# Patient Record
Sex: MALE | Race: WHITE | Employment: OTHER | URBAN - METROPOLITAN AREA
[De-identification: names, ages, dates, MRNs, and addresses within clinical notes are randomized per-mention and may not be internally consistent; named-entity substitution may affect disease eponyms.]

---

## 2017-09-18 RX ORDER — ZOLPIDEM TARTRATE 5 MG/1
TABLET ORAL
Qty: 90 TAB | Refills: 0 | Status: SHIPPED | OUTPATIENT
Start: 2017-09-18 | End: 2017-12-14 | Stop reason: SDUPTHER

## 2017-09-18 NOTE — TELEPHONE ENCOUNTER
Requested Prescriptions     Pending Prescriptions Disp Refills    zolpidem (AMBIEN) 5 mg tablet [Pharmacy Med Name: ZOLPIDEM 5MG        TAB] 90 Tab 0     Sig: TAKE ONE TABLET BY MOUTH AT BEDTIME       Last Refill: 6-19-17  Last visit:2-15-17

## 2017-11-02 ENCOUNTER — HOSPITAL ENCOUNTER (EMERGENCY)
Age: 67
Discharge: HOME | End: 2017-11-02
Attending: EMERGENCY MEDICINE
Payer: MEDICARE

## 2017-11-02 ENCOUNTER — APPOINTMENT (OUTPATIENT)
Dept: GENERAL RADIOLOGY | Age: 67
End: 2017-11-02
Attending: EMERGENCY MEDICINE
Payer: MEDICARE

## 2017-11-02 VITALS
OXYGEN SATURATION: 98 % | DIASTOLIC BLOOD PRESSURE: 67 MMHG | HEIGHT: 72 IN | SYSTOLIC BLOOD PRESSURE: 135 MMHG | RESPIRATION RATE: 16 BRPM | HEART RATE: 68 BPM | TEMPERATURE: 98 F

## 2017-11-02 DIAGNOSIS — R07.81 RIB PAIN ON LEFT SIDE: Primary | ICD-10-CM

## 2017-11-02 PROCEDURE — 71101 X-RAY EXAM UNILAT RIBS/CHEST: CPT

## 2017-11-02 PROCEDURE — 93005 ELECTROCARDIOGRAM TRACING: CPT

## 2017-11-02 PROCEDURE — 99284 EMERGENCY DEPT VISIT MOD MDM: CPT

## 2017-11-02 RX ORDER — TRAMADOL HYDROCHLORIDE 50 MG/1
50 TABLET ORAL
Qty: 20 TAB | Refills: 0 | Status: SHIPPED | OUTPATIENT
Start: 2017-11-02 | End: 2017-11-12

## 2017-11-02 NOTE — ED NOTES
Patient c/o L side rib pain since waking up this morning. Patient states it is worse when coughing and sneezing. Patient has hx of stents.

## 2017-11-02 NOTE — DISCHARGE INSTRUCTIONS

## 2017-11-02 NOTE — ED PROVIDER NOTES
[facility] [address]  EMERGENCY DEPARTMENT HISTORY AND PHYSICAL EXAM       Date of Service: 11/2/2017   Patient Name: Rosana French   YOB: 1950  Medical Record Number: [id number]    History of Presenting Illness     Chief Complaint   Patient presents with    Rib Pain     L side rib pain since this morning, worse when coughing or sneezing. denies injury. History Provided By:  patient    Additional History:   Rosana French is a 69 y.o. male with PMhx significant for psoriatic arthritis who presents ambulatory to the ED with cc of progressively worsening, mild L sided rib pain since waking up this morning. Pt states his pain radiates from the back of his L ribs to the front. He states his pain is exacerbated with forceful movements like sneezing and coughing, but he denies any pain with inspiration. He notes that he normally sleeps on his back but last night he was sleeping on his L side. He denies taking any medications for his symptoms. He notes he is retired and no longer works. He specifically denies any fevers, chills, nausea, vomiting, chest pain, shortness of breath, headache, rash, diarrhea, sweating or weight loss. Social Hx: former Tobacco, - EtOH, - Illicit Drugs    There are no other complaints, changes or physical findings at this time.     Primary Care Provider: Shelby Hess MD     Past History     Past Medical History:   Past Medical History:   Diagnosis Date    History of BPH     Psoriatic arthritis ([facility] [address]         Past Surgical History:   Past Surgical History:   Procedure Laterality Date    HX KNEE ARTHROSCOPY Bilateral     HX ORTHOPAEDIC Right     Cubital tunnel sx        Family History:   Family History   Problem Relation Age of Onset    Bleeding Prob Mother     No Known Problems Father     Heart Disease Brother         Social History:   Social History   Substance Use Topics    Smoking status: Former Smoker Quit date: 1/1/1972    Smokeless tobacco: None    Alcohol use No        Allergies:   No Known Allergies      Review of Systems   Review of Systems   Constitutional: Negative. Negative for activity change, appetite change, chills, diaphoresis, fatigue, fever and unexpected weight change. HENT: Negative. Negative for congestion, hearing loss, rhinorrhea, sneezing and voice change. Eyes: Negative. Negative for pain and visual disturbance. Respiratory: Negative. Negative for apnea, cough, choking, chest tightness and shortness of breath. Cardiovascular: Negative. Negative for chest pain and palpitations. Gastrointestinal: Negative. Negative for abdominal distention, abdominal pain, blood in stool, diarrhea, nausea and vomiting. Genitourinary: Negative. Negative for difficulty urinating, flank pain, frequency and urgency. No discharge   Musculoskeletal: Positive for myalgias (L sided ribs ). Negative for arthralgias, back pain and neck stiffness. Skin: Negative. Negative for color change and rash. Neurological: Negative. Negative for dizziness, seizures, syncope, speech difficulty, weakness, numbness and headaches. Hematological: Negative for adenopathy. Psychiatric/Behavioral: Negative. Negative for agitation, behavioral problems, dysphoric mood and suicidal ideas. The patient is not nervous/anxious. Physical Exam  Physical Exam   Constitutional: He is oriented to person, place, and time. He appears well-developed and well-nourished. No distress. HENT:   Head: Normocephalic and atraumatic. Mouth/Throat: Oropharynx is clear and moist. No oropharyngeal exudate. Eyes: Conjunctivae and EOM are normal. Pupils are equal, round, and reactive to light. Right eye exhibits no discharge. Left eye exhibits no discharge. Neck: Normal range of motion. Neck supple. Cardiovascular: Normal rate, regular rhythm and intact distal pulses. Exam reveals no gallop and no friction rub. No murmur heard. Pulmonary/Chest: Effort normal and breath sounds normal. No respiratory distress. He has no wheezes. He has no rales. He exhibits no tenderness. Abdominal: Soft. Bowel sounds are normal. He exhibits no distension and no mass. There is no tenderness. There is no rebound and no guarding. Musculoskeletal: Normal range of motion. He exhibits no edema. TTP T5 dermatome    Lymphadenopathy:     He has no cervical adenopathy. Neurological: He is alert and oriented to person, place, and time. No cranial nerve deficit. Coordination normal.   Skin: Skin is warm and dry. No rash noted. No erythema. Psychiatric: He has a normal mood and affect. Nursing note and vitals reviewed. Medical Decision Making   I am the first provider for this patient. I reviewed the vital signs, available nursing notes, past medical history, past surgical history, family history and social history. Old Medical Records: Old medical records. Provider Notes:   DDx: Sprain, strain, fx, contusion, PNA. ED Course:  10:15 AM   Initial assessment performed. The patients presenting problems have been discussed, and they are in agreement with the care plan formulated and outlined with them. I have encouraged them to ask questions as they arise throughout their visit.       Diagnostic Study Results     Labs -      Recent Results (from the past 12 hour(s))   EKG, 12 LEAD, INITIAL    Collection Time: 11/02/17 10:02 AM   Result Value Ref Range    Ventricular Rate 64 BPM    Atrial Rate 64 BPM    P-R Interval 162 ms    QRS Duration 86 ms    Q-T Interval 378 ms    QTC Calculation (Bezet) 389 ms    Calculated P Axis 35 degrees    Calculated R Axis 28 degrees    Calculated T Axis 42 degrees    Diagnosis       Normal sinus rhythm  Normal ECG  When compared with ECG of 19-MAY-2015 04:47,  No significant change was found         Radiologic Studies -  The following have been ordered and reviewed:  EXAM:  XR RIBS LT W PA CXR MIN 3 V     INDICATION:   rib pain? Additional history: Patient complaining of left-sided chest wall pain since  waking up this morning. Pain is exacerbated by coughing or sneezing. COMPARISON: Chest x-ray, 9/27/2016 and 5/18/2015     FINDINGS: A frontal radiograph of the chest and 3 oblique views of the left ribs  demonstrate no fracture. There is no pneumothorax or pleural effusion. There are  coronary artery stents.     IMPRESSION  IMPRESSION:    1. No visible rib fracture. 2. Coronary artery disease    Vital Signs-Reviewed the patient's vital signs. Patient Vitals for the past 12 hrs:   Temp Pulse Resp BP SpO2   11/02/17 1237 - 68 16 135/67 98 %   11/02/17 1100 - (!) 53 15 134/67 97 %   11/02/17 1030 - 62 14 128/64 97 %   11/02/17 1005 98 °F (36.7 °C) 68 16 137/67 99 %       Medications Given in the ED:  Medications - No data to display    EKG interpretation: (Preliminary) 10:02  Rhythm: normal sinus rhythm; and regular . Rate (approx.): 64 bpm; Axis: normal; TX interval: normal; QRS interval: normal ; ST/T wave: normal.  Written by Palak Barnes, ED Scribe, as dictated by Ashley Riley. Zachary Hussein MD    Diagnosis   Clinical Impression:   1. Rib pain on left side         Plan:  1:   Follow-up Information     Follow up With Details Comments [address], [place] Call in 1 week As needed, If symptoms worsen [address] [place] [address] Rd  [phone]          2:   Discharge Medication List as of 11/2/2017 12:35 PM      START taking these medications    Details   traMADol (ULTRAM) 50 mg tablet Take 1 Tab by mouth every six (6) hours as needed for Pain for up to 10 days. Max Daily Amount: 200 mg., Print, Disp-20 Tab, R-0         CONTINUE these medications which have NOT CHANGED    Details   zolpidem (AMBIEN) 5 mg tablet TAKE ONE TABLET BY MOUTH AT BEDTIME, Print, Disp-90 Tab, R-0      atorvastatin (LIPITOR) 20 mg tablet Take 1 Tab by mouth nightly. , Print, Disp-30 Tab, R-6      metoprolol succinate (TOPROL-XL) 25 mg XL tablet Take 0.5 Tabs by mouth daily. , Print, Disp-30 Tab, R-6      ticagrelor (BRILINTA) 90 mg tablet Take 1 Tab by mouth every twelve (12) hours every twelve (12) hours. , Print, Disp-60 Tab, R-6      aspirin delayed-release 81 mg tablet Take  by mouth daily. , Historical Med      etanercept (ENBREL) 50 mg/mL (0.98 mL) injection 50 mg by SubCUTAneous route every seven (7) days. , Historical Med      doxazosin (CARDURA) 4 mg tablet Take 4 mg by mouth nightly. Indications: BENIGN PROSTATIC HYPERTROPHY, Historical Med      oxybutynin chloride XL 10 mg CR tablet Take 10 mg by mouth nightly., Historical Med      finasteride (PROSCAR) 5 mg tablet Take 5 mg by mouth nightly., Historical Med      folic acid (FOLVITE) 1 mg tablet Take 1 mg by mouth daily. , Historical Med      methotrexate (RHEUMATREX) 2.5 mg tablet Take 15 mg by mouth every Sunday., Historical Med      oxyCODONE-acetaminophen (PERCOCET 7.5) 7.5-325 mg per tablet Take 1 Tab by mouth two (2) times a day., Historical Med           Return to ED if Worse    Disposition Note:  DISCHARGE NOTE  12:35 PM  The patient has been re-evaluated and is ready for discharge. Reviewed available results with patient. Counseled pt on diagnosis and care plan. Pt has expressed understanding, and all questions have been answered. Pt agrees with plan and agrees to follow up as recommended, or return to the ED if their symptoms worsen. Discharge instructions have been provided and explained to the pt, along with reasons to return to the ED. Attestations: This note is prepared by Екатерина Patricia, acting as Scribe for [employer] Asad Root, [address] Asad Root MD: The scribe's documentation has been prepared under my direction and personally reviewed by me in its entirety. I confirm that the note above accurately reflects all work, treatment, procedures, and medical decision making performed by me.

## 2017-11-03 LAB
ATRIAL RATE: 64 BPM
CALCULATED P AXIS, ECG09: 35 DEGREES
CALCULATED R AXIS, ECG10: 28 DEGREES
CALCULATED T AXIS, ECG11: 42 DEGREES
DIAGNOSIS, 93000: NORMAL
P-R INTERVAL, ECG05: 162 MS
Q-T INTERVAL, ECG07: 378 MS
QRS DURATION, ECG06: 86 MS
QTC CALCULATION (BEZET), ECG08: 389 MS
VENTRICULAR RATE, ECG03: 64 BPM

## 2017-12-15 RX ORDER — ZOLPIDEM TARTRATE 5 MG/1
TABLET ORAL
Qty: 90 TAB | Refills: 0 | Status: SHIPPED | OUTPATIENT
Start: 2017-12-15 | End: 2018-03-26 | Stop reason: SDUPTHER

## 2017-12-15 NOTE — TELEPHONE ENCOUNTER
Requested Prescriptions     Pending Prescriptions Disp Refills    zolpidem (AMBIEN) 5 mg tablet [Pharmacy Med Name: ZOLPIDEM 5MG        TAB] 90 Tab 0     Sig: TAKE ONE TABLET BY MOUTH AT BEDTIME       Last Refill: 11-16-17  Last visit:2-15-17

## 2018-03-23 PROBLEM — R97.20 PSA ELEVATION: Status: ACTIVE | Noted: 2018-03-23

## 2018-03-23 PROBLEM — F51.04 CHRONIC INSOMNIA: Status: ACTIVE | Noted: 2018-03-23

## 2018-03-23 PROBLEM — N13.8 BPH WITH OBSTRUCTION/LOWER URINARY TRACT SYMPTOMS: Status: ACTIVE | Noted: 2018-03-23

## 2018-03-23 PROBLEM — H40.1110 PRIMARY OPEN ANGLE GLAUCOMA OF RIGHT EYE: Status: ACTIVE | Noted: 2018-03-23

## 2018-03-23 PROBLEM — I25.10 CORONARY ARTERY DISEASE INVOLVING NATIVE CORONARY ARTERY: Status: ACTIVE | Noted: 2018-03-23

## 2018-03-23 PROBLEM — N40.1 BPH WITH OBSTRUCTION/LOWER URINARY TRACT SYMPTOMS: Status: ACTIVE | Noted: 2018-03-23

## 2018-03-23 PROBLEM — L40.50 PSORIATIC ARTHRITIS (HCC): Status: ACTIVE | Noted: 2018-03-23

## 2018-03-23 PROBLEM — E78.00 HYPERCHOLESTEROLEMIA: Status: ACTIVE | Noted: 2018-03-23

## 2018-03-26 ENCOUNTER — OFFICE VISIT (OUTPATIENT)
Dept: INTERNAL MEDICINE CLINIC | Age: 68
End: 2018-03-26

## 2018-03-26 VITALS
WEIGHT: 188.6 LBS | HEART RATE: 70 BPM | OXYGEN SATURATION: 97 % | HEIGHT: 72 IN | BODY MASS INDEX: 25.55 KG/M2 | DIASTOLIC BLOOD PRESSURE: 62 MMHG | SYSTOLIC BLOOD PRESSURE: 118 MMHG

## 2018-03-26 DIAGNOSIS — I25.10 CORONARY ARTERY DISEASE INVOLVING NATIVE CORONARY ARTERY OF NATIVE HEART WITHOUT ANGINA PECTORIS: ICD-10-CM

## 2018-03-26 DIAGNOSIS — F51.04 CHRONIC INSOMNIA: Primary | ICD-10-CM

## 2018-03-26 DIAGNOSIS — E78.00 HYPERCHOLESTEROLEMIA: ICD-10-CM

## 2018-03-26 RX ORDER — ZOLPIDEM TARTRATE 5 MG/1
5 TABLET ORAL
Qty: 90 TAB | Refills: 1 | Status: SHIPPED | OUTPATIENT
Start: 2018-03-26 | End: 2018-09-17 | Stop reason: SDUPTHER

## 2018-03-26 NOTE — PROGRESS NOTES
This note will not be viewable in [address] Subjective:     Mr. Mor Cooper presents to the office today in 1 year follow-up. The patient has chronic insomnia. A lot of this is related to his tinnitus. The patient has been on Ambien 5 mg which he takes faithfully every night at 10 x 11 p.m. he goes to sleep. He awakens at least once or twice a night due to prostate related issues however sleeps till 7 AM the next morning. He does feel refreshed and has no hangover effect. He has had no memory loss or confusion on the medication. Patient is also followed by rheumatology for psoriatic arthritis and is currently on a biologic along with Rheumatrex. He does continue to have joint pain. Patient does have heart disease for which he is followed by Dr. Marija De La Paz and is currently on Lipitor metoprolol and aspirin. He denies any exertional chest pain, claudication couple muscle soreness or GI upset. He is having routine laboratory studies done by both his cardiologist and his rheumatologist.    Past Medical History:   Diagnosis Date    BPH with obstruction/lower urinary tract symptoms 3/23/2018    Chronic insomnia 3/23/2018    Coronary artery disease involving native coronary artery 3/23/2018    Status post stents ×3    Hypercholesterolemia 3/23/2018    Primary open angle glaucoma of right eye 3/23/2018    PSA elevation 3/23/2018    Status post biopsy. Atypical small acinar proliferation    Psoriatic arthritis ([facility] [address]      Past Surgical History:   Procedure Laterality Date    HX KNEE ARTHROSCOPY Bilateral     HX ORTHOPAEDIC Right     Cubital tunnel sx     No Known Allergies  Current Outpatient Prescriptions   Medication Sig Dispense Refill    secukinumab (COSENTYX PEN) 150 mg/mL pnij by SubCUTAneous route.  zolpidem (AMBIEN) 5 mg tablet Take 1 Tab by mouth nightly. Max Daily Amount: 5 mg. 90 Tab 1    atorvastatin (LIPITOR) 20 mg tablet Take 1 Tab by mouth nightly.  30 Tab 6    metoprolol succinate (TOPROL-XL) 25 mg XL tablet Take 0.5 Tabs by mouth daily. 30 Tab 6    aspirin delayed-release 81 mg tablet Take  by mouth daily.  doxazosin (CARDURA) 4 mg tablet Take 4 mg by mouth nightly. Indications: BENIGN PROSTATIC HYPERTROPHY      oxybutynin chloride XL 10 mg CR tablet Take 10 mg by mouth nightly.  finasteride (PROSCAR) 5 mg tablet Take 5 mg by mouth nightly.  folic acid (FOLVITE) 1 mg tablet Take 1 mg by mouth daily.  methotrexate (RHEUMATREX) 2.5 mg tablet Take 15 mg by mouth every Sunday.  oxyCODONE-acetaminophen (PERCOCET 7.5) 7.5-325 mg per tablet Take 1 Tab by mouth two (2) times a day. Social History     Social History    Marital status:      Spouse name: N/A    Number of children: N/A    Years of education: N/A     Social History Main Topics    Smoking status: Former Smoker     Quit date: 1/1/1972    Smokeless tobacco: Never Used    Alcohol use No    Drug use: No    Sexual activity: Not Asked     Other Topics Concern    None     Social History Narrative     Family History   Problem Relation Age of Onset    Bleeding Prob Mother     No Known Problems Father     Heart Disease Brother        Review of Systems:  GEN: no weight loss, weight gain, fatigue or night sweats  CV: no PND, orthopnea, or palpitations  Resp: no dyspnea on exertion, no cough  Abd: no nausea, vomiting or diarrhea  EXT: denies edema, claudication  Endocrine: no hair loss, excessive thirst or polyuria  Neurological ROS: no TIA or stroke symptoms  ROS otherwise negative      Objective:     Visit Vitals    /62 (BP 1 Location: Left arm, BP Patient Position: Sitting)    Pulse 70    Ht 6' (1.829 m)    Wt 188 lb 9.6 oz (85.5 kg)    SpO2 97%    BMI 25.58 kg/m2     Body mass index is 25.58 kg/(m^2). General:   alert, cooperative and no distress   Eyes: conjunctivae/sclerae clear.  PERRL, EOM's intact   Mouth:  No oral lesions, no pharyngeal erythema, no exudates   Neck: Trachea midline, no thyromegaly, no bruits   Heart: S1 and S2 normal,no murmurs noted    Lungs: Clear to auscultation bilaterally, no increased work of breathing   Abdomen: Soft, nontender. Normal bowel sounds   Extremities: No edema or cyanosis   Neuro: ..alert, oriented x3,speech normal in context and clarity, cranial nerves II-XII intact,motor strength: full proximally and distally,gait: normal  reflexes: full and symmetric     Physical exam otherwise negative         Assessment/Plan:     Diagnoses and all orders for this visit:    Chronic insomnia  -     zolpidem (AMBIEN) 5 mg tablet; Take 1 Tab by mouth nightly. Max Daily Amount: 5 mg., Print, Disp-90 Tab, R-1    Coronary artery disease involving native coronary artery of native heart without angina pectoris    Hypercholesterolemia        Other instructions: The patient's medications are reviewed and reconciled. No change in his current medical regimen is made. A no added salt, prudent diet is encouraged  Number no change in Ambien dosage as it continues to work effectively in controlling his insomnia. Continued follow-up with rheumatology, cardiology and urology    Follow-up here yearly    Follow-up Disposition:  Return in about 1 year (around 3/26/2019).     Héctor Ivan MD

## 2018-03-26 NOTE — MR AVS SNAPSHOT
[address] [address] [address] [address] [phone] Patient: Mitra Long MRN: [id number] KER:[id number] Visit Information Date & Time Provider Department Dept. Phone Encounter #  
 3/26/2018  2:30 PM Telma Phillip MD [facility] [address] [phone] [id number] Follow-up Instructions Return in about 1 year (around 3/26/2019). Upcoming Health Maintenance Date Due Hepatitis C Screening 1950 DTaP/Tdap/Td series (1 - Tdap) 1/16/1969 FOBT Q 1 YEAR AGE 50-75 1/16/1998 ZOSTER VACCINE AGE 60> 11/16/2007 Pneumococcal 65+ Low/Medium Risk (1 of 2 - PCV13) 1/16/2013 Influenza Age 5 to Adult 8/1/2017 MEDICARE YEARLY EXAM 3/20/2018 GLAUCOMA SCREENING Q2Y 12/20/2019 Allergies as of 3/26/2018  Review Complete On: 3/26/2018 By: Telma Phillip MD  
 No Known Allergies Current Immunizations  Never Reviewed Name Date Influenza Vaccine 12/1/2017 Not reviewed this visit You Were Diagnosed With   
  
 Codes Comments Chronic insomnia    -  Primary ICD-10-CM: F51.04 
ICD-9-CM: 780.52 Coronary artery disease involving native coronary artery of native heart without angina pectoris     ICD-10-CM: I25.10 ICD-9-CM: 414.01 Hypercholesterolemia     ICD-10-CM: E78.00 ICD-9-CM: 272.0 Vitals BP Pulse Height(growth percentile) Weight(growth percentile) SpO2 BMI  
 118/62 (BP 1 Location: Left arm, BP Patient Position: Sitting) 70 6' (1.829 m) 188 lb 9.6 oz (85.5 kg) 97% 25.58 kg/m2 Smoking Status Former Smoker Vitals History BMI and BSA Data Body Mass Index Body Surface Area 25.58 kg/m 2 2.08 m 2 Preferred Pharmacy Pharmacy Name Phone [facility] [address] [phone] Your Updated Medication List  
  
   
 This list is accurate as of 3/26/18  2:42 PM.  Always use your most recent med list.  
  
  
  
  
 aspirin delayed-release 81 mg tablet Take  by mouth daily. atorvastatin 20 mg tablet Commonly known as:  LIPITOR Take 1 Tab by mouth nightly. COSENTYX  mg/mL Pnij Generic drug:  secukinumab  
by SubCUTAneous route. doxazosin 4 mg tablet Commonly known as:  CARDURA Take 4 mg by mouth nightly. Indications: BENIGN PROSTATIC HYPERTROPHY  
  
 finasteride 5 mg tablet Commonly known as:  PROSCAR Take 5 mg by mouth nightly. folic acid 1 mg tablet Commonly known as:  [employer] Take 1 mg by mouth daily. methotrexate 2.5 mg tablet Commonly known as:  Terrea Alken Take 15 mg by mouth every Sunday. metoprolol succinate 25 mg XL tablet Commonly known as:  TOPROL-XL Take 0.5 Tabs by mouth daily. oxybutynin chloride XL 10 mg CR tablet Commonly known as:  DITROPAN XL Take 10 mg by mouth nightly. oxyCODONE-acetaminophen 7.5-325 mg per tablet Commonly known as:  PERCOCET 7.5 Take 1 Tab by mouth two (2) times a day. zolpidem 5 mg tablet Commonly known as:  AMBIEN Take 1 Tab by mouth nightly. Max Daily Amount: 5 mg. Prescriptions Printed Refills  
 zolpidem (AMBIEN) 5 mg tablet 1 Sig: Take 1 Tab by mouth nightly. Max Daily Amount: 5 mg. Class: Print Route: Oral  
  
Follow-up Instructions Return in about 1 year (around 3/26/2019). Introducing [facility]! Rosalie Oneal introduces [employer] patient portal. Now you can access parts of your medical record, email your doctor's office, and request medication refills online. 1. In your internet browser, go to [email or web address] 2. Click on the First Time User? Click Here link in the Sign In box. You will see the New Member Sign Up page. 3. Enter your [employer] Access Code exactly as it appears below.  You will not need to use this code after youve completed the sign-up process. If you do not sign up before the expiration date, you must request a new code. · [employer] Access Code: [email or web address] Expires: 6/24/2018  2:17 PM 
 
4. Enter the last four digits of your Social Security Number (xxxx) and Date of Birth (mm/dd/yyyy) as indicated and click Submit. You will be taken to the next sign-up page. 5. Create a [employer] ID. This will be your [employer] login ID and cannot be changed, so think of one that is secure and easy to remember. 6. Create a [employer] password. You can change your password at any time. 7. Enter your Password Reset Question and Answer. This can be used at a later time if you forget your password. 8. Enter your e-mail address. You will receive e-mail notification when new information is available in [address] 9. Click Sign Up. You can now view and download portions of your medical record. 10. Click the Download Summary menu link to download a portable copy of your medical information. If you have questions, please visit the Frequently Asked Questions section of the [employer] website. Remember, [employer] is NOT to be used for urgent needs. For medical emergencies, dial 911. Now available from your iPhone and Android! Please provide this summary of care documentation to your next provider. Your primary care clinician is listed as MASOUD Lunsford 21. If you have any questions after today's visit, please call [phone].

## 2018-03-26 NOTE — PROGRESS NOTES
Werner Hernandez is a 77 y.o. male presenting for Follow-up  . 1. Have you been to the ER, urgent care clinic since your last visit? Hospitalized since your last visit? No    2. Have you seen or consulted any other health care providers outside of the [address] [place] [address] since your last visit? Include any pap smears or colon screening. Yes- Feb 2018 Dr Leigha Hartley for urology follow up. Fall Risk Assessment, last 12 mths 3/26/2018   Able to walk? Yes   Fall in past 12 months? No         Abuse Screening Questionnaire 3/26/2018   Do you ever feel afraid of your partner? N   Are you in a relationship with someone who physically or mentally threatens you? N   Is it safe for you to go home? Y       PHQ over the last two weeks 3/26/2018   Little interest or pleasure in doing things Not at all   Feeling down, depressed or hopeless Not at all   Total Score PHQ 2 0       There are no discontinued medications.

## 2018-03-26 NOTE — PATIENT INSTRUCTIONS

## 2018-07-02 ENCOUNTER — HOSPITAL ENCOUNTER (OUTPATIENT)
Dept: GENERAL RADIOLOGY | Age: 68
Discharge: HOME | End: 2018-07-02
Payer: MEDICARE

## 2018-07-02 DIAGNOSIS — Z79.899 NEED FOR PROPHYLACTIC CHEMOTHERAPY: ICD-10-CM

## 2018-07-02 DIAGNOSIS — G89.29 CHRONIC PAIN: ICD-10-CM

## 2018-07-02 DIAGNOSIS — L40.50 PSORIATIC ARTHROPATHY (HCC): ICD-10-CM

## 2018-07-02 DIAGNOSIS — E55.9 AVITAMINOSIS D: ICD-10-CM

## 2018-07-02 PROCEDURE — 71046 X-RAY EXAM CHEST 2 VIEWS: CPT

## 2018-08-27 ENCOUNTER — OFFICE VISIT (OUTPATIENT)
Dept: INTERNAL MEDICINE CLINIC | Age: 68
End: 2018-08-27

## 2018-08-27 VITALS
SYSTOLIC BLOOD PRESSURE: 122 MMHG | HEART RATE: 68 BPM | OXYGEN SATURATION: 99 % | DIASTOLIC BLOOD PRESSURE: 70 MMHG | WEIGHT: 180 LBS | HEIGHT: 72 IN | BODY MASS INDEX: 24.38 KG/M2

## 2018-08-27 DIAGNOSIS — R07.89 MUSCULOSKELETAL CHEST PAIN: Primary | ICD-10-CM

## 2018-08-27 RX ORDER — DICLOFENAC SODIUM 75 MG/1
75 TABLET, DELAYED RELEASE ORAL 2 TIMES DAILY
Qty: 60 TAB | Refills: 0 | Status: SHIPPED | OUTPATIENT
Start: 2018-08-27 | End: 2019-05-23

## 2018-08-27 NOTE — PATIENT INSTRUCTIONS
Musculoskeletal Chest Pain: Care Instructions  Your Care Instructions    Chest pain is not always a sign that something is wrong with your heart or that you have another serious problem. The doctor thinks your chest pain is caused by strained muscles or ligaments, inflamed chest cartilage, or another problem in your chest, rather than by your heart. You may need more tests to find the cause of your chest pain. Follow-up care is a key part of your treatment and safety. Be sure to make and go to all appointments, and call your doctor if you are having problems. It's also a good idea to know your test results and keep a list of the medicines you take. How can you care for yourself at home? · Take pain medicines exactly as directed. ¨ If the doctor gave you a prescription medicine for pain, take it as prescribed. ¨ If you are not taking a prescription pain medicine, ask your doctor if you can take an over-the-counter medicine. · Rest and protect the sore area. · Stop, change, or take a break from any activity that may be causing your pain or soreness. · Put ice or a cold pack on the sore area for 10 to 20 minutes at a time. Try to do this every 1 to 2 hours for the next 3 days (when you are awake) or until the swelling goes down. Put a thin cloth between the ice and your skin. · After 2 or 3 days, apply a heating pad set on low or a warm cloth to the area that hurts. Some doctors suggest that you go back and forth between hot and cold. · Do not wrap or tape your ribs for support. This may cause you to take smaller breaths, which could increase your risk of lung problems. · Mentholated creams such as Bengay or Icy Hot may soothe sore muscles. Follow the instructions on the package. · Follow your doctor's instructions for exercising. · Gentle stretching and massage may help you get better faster. Stretch slowly to the point just before pain begins, and hold the stretch for at least 15 to 30 seconds.  Do this 3 or 4 times a day. Stretch just after you have applied heat. · As your pain gets better, slowly return to your normal activities. Any increased pain may be a sign that you need to rest a while longer. When should you call for help? Call 911 anytime you think you may need emergency care. For example, call if:    · You have chest pain or pressure. This may occur with:  ¨ Sweating. ¨ Shortness of breath. ¨ Nausea or vomiting. ¨ Pain that spreads from the chest to the neck, jaw, or one or both shoulders or arms. ¨ Dizziness or lightheadedness. ¨ A fast or uneven pulse. After calling 911, chew 1 adult-strength aspirin. Wait for an ambulance. Do not try to drive yourself.     · You have sudden chest pain and shortness of breath, or you cough up blood.    Call your doctor now or seek immediate medical care if:    · You have any trouble breathing.     · Your chest pain gets worse.     · Your chest pain occurs consistently with exercise and is relieved by rest.    Watch closely for changes in your health, and be sure to contact your doctor if:    · Your chest pain does not get better after 1 week. Where can you learn more? Go to http://anni-delfin.info/. Enter V293 in the search box to learn more about \"Musculoskeletal Chest Pain: Care Instructions. \"  Current as of: November 20, 2017  Content Version: 11.7  © 3128-8523 [employer]. Care instructions adapted under license by [employer] (which disclaims liability or warranty for this information). If you have questions about a medical condition or this instruction, always ask your healthcare professional. [place] [address] any warranty or liability for your use of this information.

## 2018-08-27 NOTE — MR AVS SNAPSHOT
[address] [address] [address] [address] [phone] Patient: Manfred Harvey MRN: [id number] CSP:[id number]/[id number] Visit Information Date & Time Provider Department Dept. Phone Encounter #  
 8/27/2018 10:30 AM [facility] [phone] [id number] Follow-up Instructions Return if symptoms worsen or fail to improve. Upcoming Health Maintenance Date Due Hepatitis C Screening 1950 DTaP/Tdap/Td series (1 - Tdap) 1/16/1969 FOBT Q 1 YEAR AGE 50-75 1/16/1998 ZOSTER VACCINE AGE 60> 11/16/2007 Pneumococcal 65+ Low/Medium Risk (1 of 2 - PCV13) 1/16/2013 MEDICARE YEARLY EXAM 3/20/2018 Influenza Age 5 to Adult 8/1/2018 GLAUCOMA SCREENING Q2Y 6/13/2020 Allergies as of 8/27/2018  Review Complete On: 8/27/2018 By: Cher Broderick MD  
 No Known Allergies Current Immunizations  Never Reviewed Name Date Influenza Vaccine 12/1/2017 Not reviewed this visit You Were Diagnosed With   
  
 Codes Comments Musculoskeletal chest pain    -  Primary ICD-10-CM: R07.89 ICD-9-CM: 786.59 Vitals BP Pulse Height(growth percentile) Weight(growth percentile) SpO2 BMI  
 122/70 (BP 1 Location: Left arm, BP Patient Position: Sitting) 68 6' (1.829 m) 180 lb (81.6 kg) 99% 24.41 kg/m2 Smoking Status Former Smoker Vitals History BMI and BSA Data Body Mass Index Body Surface Area  
 24.41 kg/m 2 2.04 m 2 Preferred Pharmacy Pharmacy Name Phone [facility] [address], [address] [place] [address] Avenue [phone] Your Updated Medication List  
  
   
This list is accurate as of 8/27/18 10:55 AM.  Always use your most recent med list.  
  
  
  
  
 aspirin delayed-release 81 mg tablet Take  by mouth daily. atorvastatin 20 mg tablet Commonly known as:  LIPITOR Take 1 Tab by mouth nightly. COSENTYX  mg/mL Pnij Generic drug:  secukinumab  
by SubCUTAneous route. diclofenac EC 75 mg EC tablet Commonly known as:  VOLTAREN Take 1 Tab by mouth two (2) times a day. doxazosin 4 mg tablet Commonly known as:  CARDURA Take 4 mg by mouth nightly. Indications: BENIGN PROSTATIC HYPERTROPHY  
  
 finasteride 5 mg tablet Commonly known as:  PROSCAR Take 5 mg by mouth nightly. folic acid 1 mg tablet Commonly known as:  Google Take 1 mg by mouth daily. methotrexate 2.5 mg tablet Commonly known as:  Forestine Candy Take 15 mg by mouth every Sunday. metoprolol succinate 25 mg XL tablet Commonly known as:  TOPROL-XL Take 0.5 Tabs by mouth daily. oxybutynin chloride XL 10 mg CR tablet Commonly known as:  DITROPAN XL Take 10 mg by mouth nightly. oxyCODONE-acetaminophen 7.5-325 mg per tablet Commonly known as:  PERCOCET 7.5 Take 1 Tab by mouth two (2) times a day. zolpidem 5 mg tablet Commonly known as:  AMBIEN Take 1 Tab by mouth nightly. Max Daily Amount: 5 mg. Prescriptions Sent to Pharmacy Refills  
 diclofenac EC (VOLTAREN) 75 mg EC tablet 0 Sig: Take 1 Tab by mouth two (2) times a day. Class: Normal  
 Pharmacy: [address] [place] [address] #: [phone] Route: Oral  
  
Follow-up Instructions Return if symptoms worsen or fail to improve. Introducing [facility]! [facility] introduces [employer] patient portal. Now you can access parts of your medical record, email your doctor's office, and request medication refills online. 1. In your internet browser, go to [email or web address] 2. Click on the First Time User? Click Here link in the Sign In box. You will see the New Member Sign Up page. 3. Enter your [employer] Access Code exactly as it appears below.  You will not need to use this code after youve completed the sign-up process. If you do not sign up before the expiration date, you must request a new code. · [employer] Access Code: [email or web address] Expires: 11/25/2018 10:18 AM 
 
4. Enter the last four digits of your Social Security Number (xxxx) and Date of Birth (mm/dd/yyyy) as indicated and click Submit. You will be taken to the next sign-up page. 5. Create a [employer] ID. This will be your [employer] login ID and cannot be changed, so think of one that is secure and easy to remember. 6. Create a [employer] password. You can change your password at any time. 7. Enter your Password Reset Question and Answer. This can be used at a later time if you forget your password. 8. Enter your e-mail address. You will receive e-mail notification when new information is available in [address] 9. Click Sign Up. You can now view and download portions of your medical record. 10. Click the Download Summary menu link to download a portable copy of your medical information. If you have questions, please visit the Frequently Asked Questions section of the [employer] website. Remember, [employer] is NOT to be used for urgent needs. For medical emergencies, dial 911. Now available from your iPhone and Android! Please provide this summary of care documentation to your next provider. Your primary care clinician is listed as MASOUD Lunsford 21. If you have any questions after today's visit, please call [phone].

## 2018-08-27 NOTE — PROGRESS NOTES
Ale Waller is a 77 y.o. male presenting for Arm Pain  . 1. Have you been to the ER, urgent care clinic since your last visit? Hospitalized since your last visit? No    2. Have you seen or consulted any other health care providers outside of the [address] [place] [address] since your last visit? Include any pap smears or colon screening. No    Fall Risk Assessment, last 12 mths 3/26/2018   Able to walk? Yes   Fall in past 12 months? No         Abuse Screening Questionnaire 3/26/2018   Do you ever feel afraid of your partner? N   Are you in a relationship with someone who physically or mentally threatens you? N   Is it safe for you to go home? Y       PHQ over the last two weeks 3/26/2018   Little interest or pleasure in doing things Not at all   Feeling down, depressed, irritable, or hopeless Not at all   Total Score PHQ 2 0       There are no discontinued medications.

## 2018-08-27 NOTE — PROGRESS NOTES
This note will not be viewable in [address] Subjective:     Mr. Lorena Valentin presents to the office with complaints of bilateral anterior axillary pain which is been ongoing for 3 months. He describes the pain as an aching pain which does not fluctuate with physical activity. It is well localized and not exacerbated by taking a deep breath. He states that this pain is completely different than the pain that he had when he had previous cardiac stents placed. The patient is on biological medication for his psoriasis and psoriatic arthritis. He is seen by Dr. Veronica Smalls on a every 3 month basis and had blood tests at least 2 months ago which were unrevealing. He did have a chest x-ray on July 2 which was unrevealing. He has had no fever or sweats but is had a pot approximately an 8 pound weight loss in 2-3-month period of time. He denies any cough or abdominal pain. Ms. had no back pain. He has not tried any anti-inflammatory medication. He does do a fair amount of yard work and does use a backpack blower at times. Past Medical History:   Diagnosis Date    BPH with obstruction/lower urinary tract symptoms 3/23/2018    Chronic insomnia 3/23/2018    Coronary artery disease involving native coronary artery 3/23/2018    Status post stents ×3    Hypercholesterolemia 3/23/2018    Primary open angle glaucoma of right eye 3/23/2018    PSA elevation 3/23/2018    Status post biopsy. Atypical small acinar proliferation    Psoriatic arthritis ([facility] [address]      Past Surgical History:   Procedure Laterality Date    HX KNEE ARTHROSCOPY Bilateral     HX ORTHOPAEDIC Right     Cubital tunnel sx     No Known Allergies  Current Outpatient Prescriptions   Medication Sig Dispense Refill    diclofenac EC (VOLTAREN) 75 mg EC tablet Take 1 Tab by mouth two (2) times a day. 60 Tab 0    secukinumab (COSENTYX PEN) 150 mg/mL pnij by SubCUTAneous route.  zolpidem (AMBIEN) 5 mg tablet Take 1 Tab by mouth nightly.  Max Daily Amount: 5 mg. 90 Tab 1    atorvastatin (LIPITOR) 20 mg tablet Take 1 Tab by mouth nightly. 30 Tab 6    metoprolol succinate (TOPROL-XL) 25 mg XL tablet Take 0.5 Tabs by mouth daily. 30 Tab 6    aspirin delayed-release 81 mg tablet Take  by mouth daily.  doxazosin (CARDURA) 4 mg tablet Take 4 mg by mouth nightly. Indications: BENIGN PROSTATIC HYPERTROPHY      oxybutynin chloride XL 10 mg CR tablet Take 10 mg by mouth nightly.  finasteride (PROSCAR) 5 mg tablet Take 5 mg by mouth nightly.  folic acid (FOLVITE) 1 mg tablet Take 1 mg by mouth daily.  methotrexate (RHEUMATREX) 2.5 mg tablet Take 15 mg by mouth every Sunday.  oxyCODONE-acetaminophen (PERCOCET 7.5) 7.5-325 mg per tablet Take 1 Tab by mouth two (2) times a day. Social History     Social History    Marital status:      Spouse name: N/A    Number of children: N/A    Years of education: N/A     Social History Main Topics    Smoking status: Former Smoker     Quit date: 1/1/1972    Smokeless tobacco: Never Used    Alcohol use No    Drug use: No    Sexual activity: Not Asked     Other Topics Concern    None     Social History Narrative     Family History   Problem Relation Age of Onset    Bleeding Prob Mother     No Known Problems Father     Heart Disease Brother        Review of Systems:  GEN: no weight loss, weight gain, fatigue or night sweats  CV: no PND, orthopnea, or palpitations  Resp: no dyspnea on exertion, no cough positive for axillary pain  Abd: no nausea, vomiting or diarrhea  EXT: denies edema, claudication  Endocrine: no hair loss, excessive thirst or polyuria  Neurological ROS: no TIA or stroke symptoms  ROS otherwise negative      Objective:     Visit Vitals    /70 (BP 1 Location: Left arm, BP Patient Position: Sitting)    Pulse 68    Ht 6' (1.829 m)    Wt 180 lb (81.6 kg)    SpO2 99%    BMI 24.41 kg/m2     Body mass index is 24.41 kg/(m^2).     General:   alert, cooperative and no distress   Eyes: conjunctivae/sclerae clear. PERRL, EOM's intact   Mouth:  No oral lesions, no pharyngeal erythema, no exudates   Neck: Trachea midline, no thyromegaly, no bruits   Heart: S1 and S2 normal,no murmurs noted    Lungs: Clear to auscultation bilaterally, no increased work of breathing   Abdomen: Soft, nontender. Normal bowel sounds   Extremities: No edema or cyanosis. Examination of both axillary regions reveals no palpable adenopathy but there is pain with palpation along the chest wall in the anterior axillary fold region bilaterally but no masses felt   Neuro: ..alert, oriented x3,speech normal in context and clarity, cranial nerves II-XII intact,motor strength: full proximally and distally,gait: normal  reflexes: full and symmetric     Physical exam otherwise negative         Assessment/Plan:     Diagnoses and all orders for this visit:    Musculoskeletal chest pain  -     diclofenac EC (VOLTAREN) 75 mg EC tablet; Take 1 Tab by mouth two (2) times a day., Normal, Disp-60 Tab, R-0        Other instructions: The patient's medications are reviewed and reconciled. No change in his current medical regimen is made. The patient does have palpable tenderness in the anterior chest wall along the anterior axillary folds bilaterally. No masses or adenopathy are felt. Will treat with Voltaren 75 mg twice daily. If there is no improvement he will return for further laboratory studies and CT of the chest.    Follow-up Disposition:  Return if symptoms worsen or fail to improve.     Lorne Winslow MD

## 2018-09-17 DIAGNOSIS — F51.04 CHRONIC INSOMNIA: ICD-10-CM

## 2018-09-17 RX ORDER — ZOLPIDEM TARTRATE 5 MG/1
TABLET ORAL
Qty: 90 TAB | Refills: 1 | Status: SHIPPED | OUTPATIENT
Start: 2018-09-17 | End: 2019-03-13 | Stop reason: SDUPTHER

## 2018-12-13 ENCOUNTER — HOSPITAL ENCOUNTER (OUTPATIENT)
Age: 68
Setting detail: OUTPATIENT SURGERY
Discharge: HOME | End: 2018-12-13
Attending: INTERNAL MEDICINE | Admitting: INTERNAL MEDICINE
Payer: MEDICARE

## 2018-12-13 ENCOUNTER — ANESTHESIA (OUTPATIENT)
Dept: ENDOSCOPY | Age: 68
End: 2018-12-13
Payer: MEDICARE

## 2018-12-13 ENCOUNTER — ANESTHESIA EVENT (OUTPATIENT)
Dept: ENDOSCOPY | Age: 68
End: 2018-12-13
Payer: MEDICARE

## 2018-12-13 VITALS
BODY MASS INDEX: 23.43 KG/M2 | DIASTOLIC BLOOD PRESSURE: 81 MMHG | HEART RATE: 87 BPM | SYSTOLIC BLOOD PRESSURE: 107 MMHG | TEMPERATURE: 97.7 F | WEIGHT: 173 LBS | HEIGHT: 72 IN | RESPIRATION RATE: 16 BRPM | OXYGEN SATURATION: 99 %

## 2018-12-13 PROCEDURE — 74011250636 HC RX REV CODE- 250/636

## 2018-12-13 PROCEDURE — 76060000031 HC ANESTHESIA FIRST 0.5 HR: Performed by: INTERNAL MEDICINE

## 2018-12-13 PROCEDURE — 74011250636 HC RX REV CODE- 250/636: Performed by: INTERNAL MEDICINE

## 2018-12-13 PROCEDURE — 76040000019: Performed by: INTERNAL MEDICINE

## 2018-12-13 RX ORDER — SODIUM CHLORIDE 0.9 % (FLUSH) 0.9 %
5-10 SYRINGE (ML) INJECTION AS NEEDED
Status: DISCONTINUED | OUTPATIENT
Start: 2018-12-13 | End: 2018-12-13 | Stop reason: HOSPADM

## 2018-12-13 RX ORDER — NALOXONE HYDROCHLORIDE 0.4 MG/ML
0.4 INJECTION, SOLUTION INTRAMUSCULAR; INTRAVENOUS; SUBCUTANEOUS
Status: ACTIVE | OUTPATIENT
Start: 2018-12-13 | End: 2018-12-13

## 2018-12-13 RX ORDER — FLUMAZENIL 0.1 MG/ML
0.2 INJECTION INTRAVENOUS
Status: ACTIVE | OUTPATIENT
Start: 2018-12-13 | End: 2018-12-13

## 2018-12-13 RX ORDER — ATROPINE SULFATE 0.1 MG/ML
0.5 INJECTION INTRAVENOUS
Status: DISCONTINUED | OUTPATIENT
Start: 2018-12-13 | End: 2018-12-13 | Stop reason: HOSPADM

## 2018-12-13 RX ORDER — SODIUM CHLORIDE 0.9 % (FLUSH) 0.9 %
5-10 SYRINGE (ML) INJECTION EVERY 8 HOURS
Status: DISCONTINUED | OUTPATIENT
Start: 2018-12-13 | End: 2018-12-13 | Stop reason: HOSPADM

## 2018-12-13 RX ORDER — LIDOCAINE HYDROCHLORIDE 20 MG/ML
INJECTION, SOLUTION EPIDURAL; INFILTRATION; INTRACAUDAL; PERINEURAL AS NEEDED
Status: DISCONTINUED | OUTPATIENT
Start: 2018-12-13 | End: 2018-12-13 | Stop reason: HOSPADM

## 2018-12-13 RX ORDER — EPINEPHRINE 0.1 MG/ML
1 INJECTION INTRACARDIAC; INTRAVENOUS
Status: DISCONTINUED | OUTPATIENT
Start: 2018-12-13 | End: 2018-12-13 | Stop reason: HOSPADM

## 2018-12-13 RX ORDER — PROPOFOL 10 MG/ML
INJECTION, EMULSION INTRAVENOUS AS NEEDED
Status: DISCONTINUED | OUTPATIENT
Start: 2018-12-13 | End: 2018-12-13 | Stop reason: HOSPADM

## 2018-12-13 RX ORDER — SODIUM CHLORIDE 9 MG/ML
75 INJECTION, SOLUTION INTRAVENOUS CONTINUOUS
Status: DISCONTINUED | OUTPATIENT
Start: 2018-12-13 | End: 2018-12-13 | Stop reason: HOSPADM

## 2018-12-13 RX ORDER — DEXTROMETHORPHAN/PSEUDOEPHED 2.5-7.5/.8
1.2 DROPS ORAL
Status: DISCONTINUED | OUTPATIENT
Start: 2018-12-13 | End: 2018-12-13 | Stop reason: HOSPADM

## 2018-12-13 RX ADMIN — SODIUM CHLORIDE 75 ML/HR: 900 INJECTION, SOLUTION INTRAVENOUS at 09:55

## 2018-12-13 RX ADMIN — PROPOFOL 180 MG: 10 INJECTION, EMULSION INTRAVENOUS at 10:17

## 2018-12-13 RX ADMIN — LIDOCAINE HYDROCHLORIDE 40 MG: 20 INJECTION, SOLUTION EPIDURAL; INFILTRATION; INTRACAUDAL; PERINEURAL at 10:05

## 2018-12-13 NOTE — ANESTHESIA PREPROCEDURE EVALUATION
Anesthetic History   No history of anesthetic complications            Review of Systems / Medical History  Patient summary reviewed, nursing notes reviewed and pertinent labs reviewed    Pulmonary  Within defined limits                 Neuro/Psych   Within defined limits           Cardiovascular              CAD and cardiac stents (stents x 3)    Exercise tolerance: >4 METS     GI/Hepatic/Renal  Within defined limits              Endo/Other        Arthritis (Psoriatic arthritis--mainly affects toes & fingers.   On narcotics for chronic pain, BID)     Other Findings   Comments: H/o BPH           Physical Exam    Airway  Mallampati: I  TM Distance: < 4 cm  Neck ROM: normal range of motion   Mouth opening: Normal    Comments: A/W appears anterior  Sloping short chin Cardiovascular    Rhythm: regular  Rate: normal      Pertinent negatives: No murmur   Dental    Dentition: Caps/crowns and Bridges  Comments: Bridge upper right, crowns   Pulmonary  Breath sounds clear to auscultation               Abdominal  GI exam deferred       Other Findings            Anesthetic Plan    ASA: 2  Anesthesia type: total IV anesthesia          Induction: Intravenous  Anesthetic plan and risks discussed with: Patient      Took beta blker on schedule last night

## 2018-12-13 NOTE — ANESTHESIA POSTPROCEDURE EVALUATION
Procedure(s):  COLONOSCOPY. Anesthesia Post Evaluation        Patient location during evaluation: PACU  Note status: Adequate. Level of consciousness: responsive to verbal stimuli and sleepy but conscious  Pain management: satisfactory to patient  Airway patency: patent  Anesthetic complications: no  Cardiovascular status: acceptable  Respiratory status: acceptable  Hydration status: acceptable  Comments: +Post-Anesthesia Evaluation and Assessment    Patient: Mason Fink MRN: [id number]  SSN: [id number]   YOB: 1950  Age: 77 y.o. Sex: male      Cardiovascular Function/Vital Signs    /81   Pulse 87   Temp 36.5 °C (97.7 °F)   Resp 16   Ht 6' (1.829 m)   Wt 78.5 kg (173 lb)   SpO2 99%   BMI 23.46 kg/m²     Patient is status post Procedure(s):  COLONOSCOPY. Nausea/Vomiting: Controlled. Postoperative hydration reviewed and adequate. Pain:  Pain Scale 1: Numeric (0 - 10) (12/13/18 1043)  Pain Intensity 1: 0 (12/13/18 1043)   Managed. Neurological Status: At baseline. Mental Status and Level of Consciousness: Arousable. Pulmonary Status:   O2 Device: Room air (12/13/18 1043)   Adequate oxygenation and airway patent. Complications related to anesthesia: None    Post-anesthesia assessment completed. No concerns.     Signed By: Claudia Valencia MD    12/13/2018  Post anesthesia nausea and vomiting:  controlled      Visit Vitals  /81   Pulse 87   Temp 36.5 °C (97.7 °F)   Resp 16   Ht 6' (1.829 m)   Wt 78.5 kg (173 lb)   SpO2 99%   BMI 23.46 kg/m²

## 2018-12-13 NOTE — DISCHARGE INSTRUCTIONS
Jodie Barreto  [id number]  1950    COLON DISCHARGE INSTRUCTIONS  Discomfort:  Redness at IV site- apply warm compress to area; if redness or soreness persist- contact your physician  There may be a slight amount of blood passed from the rectum  Gaseous discomfort- walking, belching will help relieve any discomfort  You may not operate a vehicle for 12 hours  You may not engage in an occupation involving machinery or appliances for rest of today  You may not drink alcoholic beverages for at least 12 hours  Avoid making any critical decisions for at least 24 hour  DIET:   Regular diet. - however -  remember your colon is empty and a heavy meal will produce gas. Avoid these foods:  vegetables, fried / greasy foods, carbonated drinks for today  MEDICATION:  Per Medication Reconciliationi       ACTIVITY:  You may not resume your normal daily activities until tomorrow AM; it is recommended that you spend the remainder of the day resting -  avoid any strenuous activity. CALL M.D. ANY SIGN OF:   Increasing pain, nausea, vomiting  Abdominal distension (swelling)  New increased bleeding (oral or rectal)  Fever (chills)    IMPRESSION:  Impression:    1. Moderate sigmoid diverticulosis  2. Large internal hemorrhoids  3. Otherwise normal colonoscopy through to the cecum    Recommendations:   1.  Repeat colonoscopy in 10 years for screening    Follow-up Instructions:  Telephone # [phone]    Clearance MD Jairo

## 2018-12-13 NOTE — PROGRESS NOTES
Anesthesia reports 180mg Propofol, 40mg Lidocaine and 250mL NS given during procedure. Received report from anesthesia staff on vital signs and status of patient. Endoscope was pre-cleaned at the bedside immediately following procedure by Latonya Myers.

## 2018-12-13 NOTE — PROCEDURES
NAME:  Fatuma Guadalupe   :   1950   MRN:   [id number]     Date/Time:  2018 10:18 AM    Colonoscopy Operative Report    Procedure Type:   Colonoscopy --screening     Indications:     Screening colonoscopy  Pre-operative Diagnosis: see indication above  Post-operative Diagnosis:  See findings below  :  Noah Ochoa MD  Referring Provider: --Dave Russell MD    Exam:  Airway: clear, no airway problems anticipated  Heart: RRR, without gallops or rubs  Lungs: clear bilaterally without wheezes, crackles, or rhonchi  Abdomen: soft, nontender, nondistended, bowel sounds present  Mental Status: awake, alert and oriented to person, place and time    Sedation:  MAC anesthesia Propofol  Procedure Details:  After informed consent was obtained with all risks and benefits of procedure explained and preoperative exam completed, the patient was taken to the endoscopy suite and placed in the left lateral decubitus position. Upon sequential sedation as per above, a digital rectal exam was performed demonstrating internal hemorrhoids. The Olympus videocolonoscope  was inserted in the rectum and carefully advanced to the cecum, which was identified by the ileocecal valve and appendiceal orifice. The quality of preparation was good. The colonoscope was slowly withdrawn with careful evaluation between folds. Findings:   1. Moderate sigmoid diverticulosis  2. Large internal hemorrhoids  3. Otherwise normal colonoscopy through to the cecum    Specimen Removed:  None  Complications: None. EBL:  None. Impression:    1. Moderate sigmoid diverticulosis  2. Large internal hemorrhoids  3. Otherwise normal colonoscopy through to the cecum    Recommendations:   1. Repeat colonoscopy in 10 years for screening    Discharge Disposition:  Home in the company of a  when able to ambulate.       Josselyn Hernandez MD

## 2018-12-13 NOTE — H&P
Gastroenterology Outpatient History and Physical    Patient: Haydee Jacob    Physician: Leatha Sofia MD    Chief Complaint: CRC screening  History of Present Illness: No GI complaints    History:  Past Medical History:   Diagnosis Date    BPH with obstruction/lower urinary tract symptoms 3/23/2018    Chronic insomnia 3/23/2018    Coronary artery disease involving native coronary artery 3/23/2018    Status post stents ×3    Hypercholesterolemia 3/23/2018    Primary open angle glaucoma of right eye 3/23/2018    PSA elevation 3/23/2018    Status post biopsy. Atypical small acinar proliferation    [facility]       Past Surgical History:   Procedure Laterality Date    HX KNEE ARTHROSCOPY Bilateral     HX ORTHOPAEDIC Right     Cubital tunnel sx      Social History     Socioeconomic History    Marital status:      Spouse name: Not on file    Number of children: Not on file    Years of education: Not on file    Highest education level: Not on file   Tobacco Use    Smoking status: Former Smoker     Last attempt to quit: 1972     Years since quittin.9    Smokeless tobacco: Never Used   Substance and Sexual Activity    Alcohol use: No    Drug use: No      Family History   Problem Relation Age of Onset    Bleeding Prob Mother     No Known Problems Father     Heart Disease Brother       Patient Active Problem List   Diagnosis Code    Psoriatic arthritis ([facility] L40.50    Coronary artery disease involving native coronary artery I25.10    BPH with obstruction/lower urinary tract symptoms N40.1, N13.8    Hypercholesterolemia E78.00    Primary open angle glaucoma of right eye H40.1110    PSA elevation R97.20    Chronic insomnia F51.04       Allergies: No Known Allergies  Medications:   Prior to Admission medications    Medication Sig Start Date End Date Taking? Authorizing Provider   diclofenac EC (VOLTAREN) 75 mg EC tablet Take 1 Tab by mouth two (2) times a day. 8/27/18  Yes Nkia Amaya MD   secukinumab (COSENTYX PEN) 150 mg/mL pnij by SubCUTAneous route. Yes Provider, Historical   atorvastatin (LIPITOR) 20 mg tablet Take 1 Tab by mouth nightly. 5/19/15  Yes Morgan Neal MD   metoprolol succinate (TOPROL-XL) 25 mg XL tablet Take 0.5 Tabs by mouth daily. 5/19/15  Yes Destiny Pretty MD   aspirin delayed-release 81 mg tablet Take  by mouth daily. Yes Provider, Historical   doxazosin (CARDURA) 4 mg tablet Take 4 mg by mouth nightly. Indications: BENIGN PROSTATIC HYPERTROPHY   Yes Provider, Historical   oxybutynin chloride XL 10 mg CR tablet Take 10 mg by mouth nightly. Yes Provider, Historical   finasteride (PROSCAR) 5 mg tablet Take 5 mg by mouth nightly. Yes Provider, Historical   folic acid (FOLVITE) 1 mg tablet Take 1 mg by mouth daily. Yes Provider, Historical   methotrexate (RHEUMATREX) 2.5 mg tablet Take 15 mg by mouth every Sunday. Yes Provider, Historical   oxyCODONE-acetaminophen (PERCOCET 7.5) 7.5-325 mg per tablet Take 1 Tab by mouth two (2) times a day. Yes Provider, Historical   zolpidem (AMBIEN) 5 mg tablet TAKE 1 TABLET BY MOUTH NIGHTLY . DO NOT EXCEED  DAILY  AMOUNT  5MG 9/17/18   Megan Murguia MD     Physical Exam:   Vital Signs: Blood pressure 149/86, pulse 93, temperature 98.2 °F (36.8 °C), resp. rate 14, height 6' (1.829 m), weight 78.5 kg (173 lb), SpO2 99 %.   General: well developed, well nourished   HEENT: unremarkable   Heart: regular rhythm no mumur    Lungs: clear   Abdominal:  benign   Neurological: unremarkable   Extremities: no edema     Findings/Diagnosis: CRC screening  Plan of Care/Planned Procedure: Colonoscopy with conscious/deep sedation    Signed:  Mylene Aragon MD 12/13/2018

## 2019-03-13 DIAGNOSIS — F51.04 CHRONIC INSOMNIA: ICD-10-CM

## 2019-03-13 RX ORDER — ZOLPIDEM TARTRATE 5 MG/1
TABLET ORAL
Qty: 90 TAB | Refills: 1 | Status: SHIPPED | OUTPATIENT
Start: 2019-03-13 | End: 2019-09-09 | Stop reason: SDUPTHER

## 2019-03-20 ENCOUNTER — HOSPITAL ENCOUNTER (OUTPATIENT)
Dept: MRI IMAGING | Age: 69
Discharge: HOME | End: 2019-03-20
Attending: ORTHOPAEDIC SURGERY
Payer: MEDICARE

## 2019-03-20 DIAGNOSIS — M47.816 FACET ARTHROPATHY, LUMBAR: ICD-10-CM

## 2019-03-20 DIAGNOSIS — M54.32 LEFT SIDED SCIATICA: ICD-10-CM

## 2019-03-20 PROCEDURE — 72148 MRI LUMBAR SPINE W/O DYE: CPT

## 2019-04-10 ENCOUNTER — OFFICE VISIT (OUTPATIENT)
Dept: INTERNAL MEDICINE CLINIC | Age: 69
End: 2019-04-10

## 2019-04-10 VITALS
TEMPERATURE: 97.8 F | HEART RATE: 85 BPM | DIASTOLIC BLOOD PRESSURE: 72 MMHG | OXYGEN SATURATION: 93 % | SYSTOLIC BLOOD PRESSURE: 120 MMHG | HEIGHT: 72 IN | RESPIRATION RATE: 16 BRPM | WEIGHT: 180.4 LBS | BODY MASS INDEX: 24.43 KG/M2

## 2019-04-10 DIAGNOSIS — J02.9 ACUTE PHARYNGITIS, UNSPECIFIED ETIOLOGY: Primary | ICD-10-CM

## 2019-04-10 DIAGNOSIS — D84.821 IMMUNOSUPPRESSION DUE TO DRUG THERAPY (HCC): ICD-10-CM

## 2019-04-10 DIAGNOSIS — L40.50 PSORIATIC ARTHRITIS (HCC): ICD-10-CM

## 2019-04-10 DIAGNOSIS — Z79.899 IMMUNOSUPPRESSION DUE TO DRUG THERAPY (HCC): ICD-10-CM

## 2019-04-10 RX ORDER — AZITHROMYCIN 250 MG/1
250 TABLET, FILM COATED ORAL SEE ADMIN INSTRUCTIONS
Qty: 6 TAB | Refills: 0 | Status: SHIPPED | OUTPATIENT
Start: 2019-04-10 | End: 2019-05-23

## 2019-04-10 NOTE — PROGRESS NOTES
This note will not be viewable in [address] Eric Shen is a 68 y.o. male and presents with Sore Throat Shama Tobias Subjective: 
Mr. Sonali Centeno he presents to the office today with complaints of an upper respiratory infection with rhinorrhea, severe sore throat. Patient denies any fever but has had some mild sweats. He has had no facial pain or gum discomfort and denies retro-orbital headaches. There is been no significant cough and he denies neck stiffness or rash. The patient does have psoriatic arthritis and is immunosuppressed related to his drug therapy. Past Medical History:  
Diagnosis Date  BPH with obstruction/lower urinary tract symptoms 3/23/2018  Chronic insomnia 3/23/2018  Coronary artery disease involving native coronary artery 3/23/2018 Status post stents ×3  
 Hypercholesterolemia 3/23/2018  Immunosuppression due to drug therapy 4/10/2019  Primary open angle glaucoma of right eye 3/23/2018  PSA elevation 3/23/2018 Status post biopsy. Atypical small acinar proliferation  Psoriatic arthritis ([address] Past Surgical History:  
Procedure Laterality Date  COLONOSCOPY N/A 12/13/2018 COLONOSCOPY performed by Sanjuana Hollins MD at [facility] ENDOSCOPY  
 HX KNEE ARTHROSCOPY Bilateral   
 HX ORTHOPAEDIC Right Cubital tunnel sx No Known Allergies Current Outpatient Medications Medication Sig Dispense Refill  azithromycin (ZITHROMAX) 250 mg tablet Take 1 Tab by mouth See Admin Instructions. 6 Tab 0  
 zolpidem (AMBIEN) 5 mg tablet TAKE 1 TABLET BY MOUTH NIGHTLY *DO  NOT  EXCEED  DAILY  AMOUNT  5MG* 90 Tab 1  
 diclofenac EC (VOLTAREN) 75 mg EC tablet Take 1 Tab by mouth two (2) times a day. 60 Tab 0  
 secukinumab (COSENTYX PEN) 150 mg/mL pnij by SubCUTAneous route.  atorvastatin (LIPITOR) 20 mg tablet Take 1 Tab by mouth nightly. 30 Tab 6  
 metoprolol succinate (TOPROL-XL) 25 mg XL tablet Take 0.5 Tabs by mouth daily. 30 Tab 6  aspirin delayed-release 81 mg tablet Take  by mouth daily.  doxazosin (CARDURA) 4 mg tablet Take 4 mg by mouth nightly. Indications: BENIGN PROSTATIC HYPERTROPHY  oxybutynin chloride XL 10 mg CR tablet Take 10 mg by mouth nightly.  finasteride (PROSCAR) 5 mg tablet Take 5 mg by mouth nightly.  folic acid (FOLVITE) 1 mg tablet Take 1 mg by mouth daily.  methotrexate (RHEUMATREX) 2.5 mg tablet Take 15 mg by mouth every .  oxyCODONE-acetaminophen (PERCOCET 7.5) 7.5-325 mg per tablet Take 1 Tab by mouth two (2) times a day. Social History Socioeconomic History  Marital status:  Spouse name: Not on file  Number of children: Not on file  Years of education: Not on file  Highest education level: Not on file Tobacco Use  Smoking status: Former Smoker Last attempt to quit: 1972 Years since quittin.3  Smokeless tobacco: Never Used Substance and Sexual Activity  Alcohol use: No  
 Drug use: No  
 
Family History Problem Relation Age of Onset  Bleeding Prob Mother  No Known Problems Father  Heart Disease Brother Review of Systems Constitutional: negative for fevers, chills, anorexia and weight loss Eyes:   negative for visual disturbance and irritation ENT:   Positive for sore throat, drainage. Denies dysphageia. LN's tender. Respiratory:  negative for cough, hemoptysis, dyspnea,wheezing CV:   negative for chest pain, palpitations, lower extremity edema GI:   negative for nausea, vomiting, diarrhea, abdominal pain,melena Integumentary: negative for rash and pruritus Neurological:  negative for headaches, dizziness, vertigo, memory problems and gait Objective: 
Visit Vitals /72 (BP 1 Location: Left arm, BP Patient Position: Sitting) Pulse 85 Temp 97.8 °F (36.6 °C) (Oral) Resp 16 Ht 6' (1.829 m) Wt 180 lb 6.4 oz (81.8 kg) SpO2 93% BMI 24.47 kg/m² Body mass index is 24.47 kg/m². Physical Exam:  
General appearance - alert, well appearing, and in no distress Mental status - alert, oriented to person, place, and time EYE-GROVER, EOMI, sclera clear. No lid swelling or purulent drainage ENT- TM's clear without A/F level. Pharynx erythematous with drainage noted Nose - normal and patent, no erythema, Neck - supple, with tender anterior nodes Chest - clear to auscultation, no wheezes, rales or rhonchi, symmetric air entry Heart - normal rate, regular rhythm, normal S1, S2, no murmurs, rubs, clicks or gallops Skin-No rash appreciated Neuro -alert, oriented, normal speech, no focal findings. Assessment/Plan: 
Diagnoses and all orders for this visit: 
 
Acute pharyngitis, unspecified etiology 
-     azithromycin (ZITHROMAX) 250 mg tablet; Take 1 Tab by mouth See Admin Instructions. , Normal, Disp-6 Tab, R-0 Psoriatic arthritis ([address] Immunosuppression due to drug therapy Other Instructions: 
Warm salt water gargles to be started Tylenol and chloraseptic spray to be used symptomatically Increase po fluids Follow-up and Dispositions · Return if symptoms worsen or fail to improve. I have reviewed with the patient details of the assessment and plan and all questions were answered. Relevent patient education was performed. An After Visit Summary was printed and given to the patient.  
 
Alexandre Ibarra MD

## 2019-04-10 NOTE — PROGRESS NOTES
Som Gomez is a 68 y.o. male presenting for Sore Throat Karin Gearing 1. Have you been to the ER, urgent care clinic since your last visit? Hospitalized since your last visit? No 
 
2. Have you seen or consulted any other health care providers outside of the [address] [place] [address] since your last visit? Include any pap smears or colon screening. Dr Chiara Lockhart every 3 months. Fall Risk Assessment, last 12 mths 3/26/2018 Able to walk? Yes Fall in past 12 months? No  
 
 
 
Abuse Screening Questionnaire 3/26/2018 Do you ever feel afraid of your partner? Mikal March Are you in a relationship with someone who physically or mentally threatens you? Mikal March Is it safe for you to go home? Y  
 
 
3 most recent PHQ Screens 3/26/2018 Little interest or pleasure in doing things Not at all Feeling down, depressed, irritable, or hopeless Not at all Total Score PHQ 2 0 There are no discontinued medications.

## 2019-04-10 NOTE — PATIENT INSTRUCTIONS
Sore Throat: Care Instructions Your Care Instructions Infection by bacteria or a virus causes most sore throats. Cigarette smoke, dry air, air pollution, allergies, and yelling can also cause a sore throat. Sore throats can be painful and annoying. Fortunately, most sore throats go away on their own. If you have a bacterial infection, your doctor may prescribe antibiotics. Follow-up care is a key part of your treatment and safety. Be sure to make and go to all appointments, and call your doctor if you are having problems. It's also a good idea to know your test results and keep a list of the medicines you take. How can you care for yourself at home? · If your doctor prescribed antibiotics, take them as directed. Do not stop taking them just because you feel better. You need to take the full course of antibiotics. · Gargle with warm salt water once an hour to help reduce swelling and relieve discomfort. Use 1 teaspoon of salt mixed in 1 cup of warm water. · Take an over-the-counter pain medicine, such as acetaminophen (Tylenol), ibuprofen (Advil, Motrin), or naproxen (Aleve). Read and follow all instructions on the label. · Be careful when taking over-the-counter cold or flu medicines and Tylenol at the same time. Many of these medicines have acetaminophen, which is Tylenol. Read the labels to make sure that you are not taking more than the recommended dose. Too much acetaminophen (Tylenol) can be harmful. · Drink plenty of fluids. Fluids may help soothe an irritated throat. Hot fluids, such as tea or soup, may help decrease throat pain. · Use over-the-counter throat lozenges to soothe pain. Regular cough drops or hard candy may also help. These should not be given to young children because of the risk of choking. · Do not smoke or allow others to smoke around you. If you need help quitting, talk to your doctor about stop-smoking programs and medicines. These can increase your chances of quitting for good. · Use a vaporizer or humidifier to add moisture to your bedroom. Follow the directions for cleaning the machine. When should you call for help? Call your doctor now or seek immediate medical care if: 
  · You have new or worse trouble swallowing.  
  · Your sore throat gets much worse on one side.  
 Watch closely for changes in your health, and be sure to contact your doctor if you do not get better as expected. Where can you learn more? Go to http://anni-delfin.info/. Enter [phone] [id number] 19 in the search box to learn more about \"Sore Throat: Care Instructions. \" Current as of: March 27, 2018 Content Version: 11.9 © 4983-6629 [employer]. Care instructions adapted under license by [employer] (which disclaims liability or warranty for this information). If you have questions about a medical condition or this instruction, always ask your healthcare professional. [place] [address] any warranty or liability for your use of this information.

## 2019-04-23 ENCOUNTER — HOSPITAL ENCOUNTER (OUTPATIENT)
Dept: INTERVENTIONAL RADIOLOGY/VASCULAR | Age: 69
Discharge: HOME | End: 2019-04-23
Attending: ORTHOPAEDIC SURGERY
Payer: MEDICARE

## 2019-04-23 VITALS
RESPIRATION RATE: 20 BRPM | HEART RATE: 76 BPM | OXYGEN SATURATION: 100 % | DIASTOLIC BLOOD PRESSURE: 67 MMHG | TEMPERATURE: 98.4 F | SYSTOLIC BLOOD PRESSURE: 135 MMHG

## 2019-04-23 DIAGNOSIS — M54.32 SCIATICA OF LEFT SIDE: ICD-10-CM

## 2019-04-23 DIAGNOSIS — M48.061 LUMBAR SPINAL STENOSIS: ICD-10-CM

## 2019-04-23 PROCEDURE — 74011250636 HC RX REV CODE- 250/636: Performed by: STUDENT IN AN ORGANIZED HEALTH CARE EDUCATION/TRAINING PROGRAM

## 2019-04-23 PROCEDURE — 64484 NJX AA&/STRD TFRM EPI L/S EA: CPT

## 2019-04-23 PROCEDURE — 74011636320 HC RX REV CODE- 636/320: Performed by: STUDENT IN AN ORGANIZED HEALTH CARE EDUCATION/TRAINING PROGRAM

## 2019-04-23 PROCEDURE — 64483 NJX AA&/STRD TFRM EPI L/S 1: CPT

## 2019-04-23 PROCEDURE — 74011000250 HC RX REV CODE- 250: Performed by: STUDENT IN AN ORGANIZED HEALTH CARE EDUCATION/TRAINING PROGRAM

## 2019-04-23 RX ORDER — DEXAMETHASONE SODIUM PHOSPHATE 10 MG/ML
14 INJECTION INTRAMUSCULAR; INTRAVENOUS ONCE
Status: COMPLETED | OUTPATIENT
Start: 2019-04-23 | End: 2019-04-23

## 2019-04-23 RX ORDER — LIDOCAINE HYDROCHLORIDE 10 MG/ML
5 INJECTION, SOLUTION EPIDURAL; INFILTRATION; INTRACAUDAL; PERINEURAL ONCE
Status: COMPLETED | OUTPATIENT
Start: 2019-04-23 | End: 2019-04-23

## 2019-04-23 RX ORDER — LIDOCAINE HYDROCHLORIDE 20 MG/ML
20 INJECTION, SOLUTION EPIDURAL; INFILTRATION; INTRACAUDAL; PERINEURAL ONCE
Status: COMPLETED | OUTPATIENT
Start: 2019-04-23 | End: 2019-04-23

## 2019-04-23 RX ADMIN — LIDOCAINE HYDROCHLORIDE 10 ML: 20 INJECTION, SOLUTION EPIDURAL; INFILTRATION; INTRACAUDAL; PERINEURAL at 13:34

## 2019-04-23 RX ADMIN — SODIUM BICARBONATE 2 ML: 0.2 INJECTION, SOLUTION INTRAVENOUS at 13:34

## 2019-04-23 RX ADMIN — LIDOCAINE HYDROCHLORIDE 5 ML: 10 INJECTION, SOLUTION EPIDURAL; INFILTRATION; INTRACAUDAL; PERINEURAL at 13:33

## 2019-04-23 RX ADMIN — DEXAMETHASONE SODIUM PHOSPHATE 14 MG: 10 INJECTION, SOLUTION INTRAMUSCULAR; INTRAVENOUS at 13:34

## 2019-04-23 RX ADMIN — IOPAMIDOL 2 ML: 408 INJECTION, SOLUTION INTRATHECAL at 13:34

## 2019-04-23 NOTE — ROUTINE PROCESS
Procedure reviewed with patient by Dr. Toyin Richards. Opportunity to verbalize questions and concerns. Consent obtained.

## 2019-04-23 NOTE — DISCHARGE INSTRUCTIONS
Steroid Injection Discharge Instructions    General Information:   A steroid injection was performed today, placing a combination of a steroid and an anesthetic (numbing medicine) into the space around the nerves of your spine. This is done to treat back pain. It may take 7-10 days for the injection to reach its full potential.  This procedure can be done at any level of the spinal column, depending on where your pain is. Your doctor will have ordered the appropriate level to be treated prior to your coming in for the procedure. Home Care Instructions: You can resume your regular diet. Do not drink alcohol. You may notice that you have to use your pain medications less after your injection. Some people do not notice much of a change in their pain after the first injection. If that is the case, it is worth your time to have a second one done. This is why these injections are sometimes ordered in a series of three. Keep the puncture site clean and dry for 24 hours, and then you may remove the dressing. Showering is acceptable after the bandage is removed. Call If:   You should call your Physician and/or the Radiology Nurse if you have any bleeding other than a small spot on your bandage. Call if you have any signs of infection, fever, increased pain at the puncture site, confusion, or a headache that worsens when you stand and eases when lying flat. Follow-Up Instructions:  Please see your ordering doctor as he/she has requested. Let you doctor know if you have relief from your pain so they may schedule another injection for you if it is indicated.     To Reach Us:  [phone]    Dr. Veena Ludwig      Patient Signature:  Date: 4/23/2019  Discharging Nurse: Laya Singer RN

## 2019-05-04 ENCOUNTER — APPOINTMENT (OUTPATIENT)
Dept: CT IMAGING | Age: 69
End: 2019-05-04
Attending: PHYSICIAN ASSISTANT
Payer: MEDICARE

## 2019-05-04 ENCOUNTER — APPOINTMENT (OUTPATIENT)
Dept: ULTRASOUND IMAGING | Age: 69
End: 2019-05-04
Attending: EMERGENCY MEDICINE
Payer: MEDICARE

## 2019-05-04 ENCOUNTER — HOSPITAL ENCOUNTER (EMERGENCY)
Age: 69
Discharge: HOME | End: 2019-05-04
Attending: EMERGENCY MEDICINE
Payer: MEDICARE

## 2019-05-04 ENCOUNTER — APPOINTMENT (OUTPATIENT)
Dept: GENERAL RADIOLOGY | Age: 69
End: 2019-05-04
Attending: EMERGENCY MEDICINE
Payer: MEDICARE

## 2019-05-04 VITALS
BODY MASS INDEX: 24.22 KG/M2 | TEMPERATURE: 98.5 F | OXYGEN SATURATION: 97 % | HEART RATE: 65 BPM | WEIGHT: 178.57 LBS | DIASTOLIC BLOOD PRESSURE: 77 MMHG | SYSTOLIC BLOOD PRESSURE: 149 MMHG | RESPIRATION RATE: 15 BRPM

## 2019-05-04 DIAGNOSIS — R10.31 RIGHT GROIN PAIN: ICD-10-CM

## 2019-05-04 DIAGNOSIS — K40.90 RIGHT INGUINAL HERNIA: Primary | ICD-10-CM

## 2019-05-04 LAB
ALBUMIN SERPL-MCNC: 4.1 G/DL (ref 3.5–5)
ALBUMIN/GLOB SERPL: 1.3 {RATIO} (ref 1.1–2.2)
ALP SERPL-CCNC: 118 U/L (ref 45–117)
ALT SERPL-CCNC: 37 U/L (ref 12–78)
ANION GAP SERPL CALC-SCNC: 3 MMOL/L (ref 5–15)
APPEARANCE UR: CLEAR
AST SERPL-CCNC: 22 U/L (ref 15–37)
BACTERIA URNS QL MICRO: NEGATIVE /HPF
BASOPHILS # BLD: 0.1 K/UL (ref 0–0.1)
BASOPHILS NFR BLD: 1 % (ref 0–1)
BILIRUB SERPL-MCNC: 0.9 MG/DL (ref 0.2–1)
BILIRUB UR QL: NEGATIVE
BUN SERPL-MCNC: 12 MG/DL (ref 6–20)
BUN/CREAT SERPL: 13 (ref 12–20)
CALCIUM SERPL-MCNC: 8.7 MG/DL (ref 8.5–10.1)
CHLORIDE SERPL-SCNC: 107 MMOL/L (ref 97–108)
CO2 SERPL-SCNC: 30 MMOL/L (ref 21–32)
COLOR UR: ABNORMAL
CREAT SERPL-MCNC: 0.95 MG/DL (ref 0.7–1.3)
DIFFERENTIAL METHOD BLD: NORMAL
EOSINOPHIL # BLD: 0.1 K/UL (ref 0–0.4)
EOSINOPHIL NFR BLD: 1 % (ref 0–7)
EPITH CASTS URNS QL MICRO: ABNORMAL /LPF
ERYTHROCYTE [DISTWIDTH] IN BLOOD BY AUTOMATED COUNT: 13.1 % (ref 11.5–14.5)
GLOBULIN SER CALC-MCNC: 3.1 G/DL (ref 2–4)
GLUCOSE SERPL-MCNC: 100 MG/DL (ref 65–100)
GLUCOSE UR STRIP.AUTO-MCNC: NEGATIVE MG/DL
HCT VFR BLD AUTO: 39.9 % (ref 36.6–50.3)
HGB BLD-MCNC: 13.8 G/DL (ref 12.1–17)
HGB UR QL STRIP: NEGATIVE
IMM GRANULOCYTES # BLD AUTO: 0 K/UL (ref 0–0.04)
IMM GRANULOCYTES NFR BLD AUTO: 0 % (ref 0–0.5)
KETONES UR QL STRIP.AUTO: NEGATIVE MG/DL
LEUKOCYTE ESTERASE UR QL STRIP.AUTO: NEGATIVE
LYMPHOCYTES # BLD: 1.6 K/UL (ref 0.8–3.5)
LYMPHOCYTES NFR BLD: 26 % (ref 12–49)
MCH RBC QN AUTO: 30 PG (ref 26–34)
MCHC RBC AUTO-ENTMCNC: 34.6 G/DL (ref 30–36.5)
MCV RBC AUTO: 86.7 FL (ref 80–99)
MONOCYTES # BLD: 0.6 K/UL (ref 0–1)
MONOCYTES NFR BLD: 10 % (ref 5–13)
MUCOUS THREADS URNS QL MICRO: ABNORMAL /LPF
NEUTS SEG # BLD: 3.7 K/UL (ref 1.8–8)
NEUTS SEG NFR BLD: 62 % (ref 32–75)
NITRITE UR QL STRIP.AUTO: NEGATIVE
NRBC # BLD: 0 K/UL (ref 0–0.01)
NRBC BLD-RTO: 0 PER 100 WBC
PH UR STRIP: 6 [PH] (ref 5–8)
PLATELET # BLD AUTO: 187 K/UL (ref 150–400)
PMV BLD AUTO: 11.3 FL (ref 8.9–12.9)
POTASSIUM SERPL-SCNC: 4.2 MMOL/L (ref 3.5–5.1)
PROT SERPL-MCNC: 7.2 G/DL (ref 6.4–8.2)
PROT UR STRIP-MCNC: NEGATIVE MG/DL
RBC # BLD AUTO: 4.6 M/UL (ref 4.1–5.7)
RBC #/AREA URNS HPF: ABNORMAL /HPF (ref 0–5)
SODIUM SERPL-SCNC: 140 MMOL/L (ref 136–145)
SP GR UR REFRACTOMETRY: 1.02 (ref 1–1.03)
UA: UC IF INDICATED,UAUC: ABNORMAL
UROBILINOGEN UR QL STRIP.AUTO: 1 EU/DL (ref 0.2–1)
WBC # BLD AUTO: 6.1 K/UL (ref 4.1–11.1)
WBC URNS QL MICRO: ABNORMAL /HPF (ref 0–4)

## 2019-05-04 PROCEDURE — 74177 CT ABD & PELVIS W/CONTRAST: CPT

## 2019-05-04 PROCEDURE — 74011636320 HC RX REV CODE- 636/320: Performed by: EMERGENCY MEDICINE

## 2019-05-04 PROCEDURE — 36415 COLL VENOUS BLD VENIPUNCTURE: CPT

## 2019-05-04 PROCEDURE — 81001 URINALYSIS AUTO W/SCOPE: CPT

## 2019-05-04 PROCEDURE — 99283 EMERGENCY DEPT VISIT LOW MDM: CPT

## 2019-05-04 PROCEDURE — 96374 THER/PROPH/DIAG INJ IV PUSH: CPT

## 2019-05-04 PROCEDURE — 80053 COMPREHEN METABOLIC PANEL: CPT

## 2019-05-04 PROCEDURE — 74011250636 HC RX REV CODE- 250/636: Performed by: PHYSICIAN ASSISTANT

## 2019-05-04 PROCEDURE — 85025 COMPLETE CBC W/AUTO DIFF WBC: CPT

## 2019-05-04 PROCEDURE — 74022 RADEX COMPL AQT ABD SERIES: CPT

## 2019-05-04 PROCEDURE — 76870 US EXAM SCROTUM: CPT

## 2019-05-04 RX ORDER — DOCUSATE SODIUM 100 MG/1
100 CAPSULE, LIQUID FILLED ORAL 2 TIMES DAILY
Qty: 30 CAP | Refills: 0 | Status: SHIPPED | OUTPATIENT
Start: 2019-05-04 | End: 2022-09-23

## 2019-05-04 RX ORDER — SODIUM CHLORIDE 0.9 % (FLUSH) 0.9 %
10 SYRINGE (ML) INJECTION
Status: COMPLETED | OUTPATIENT
Start: 2019-05-04 | End: 2019-05-04

## 2019-05-04 RX ORDER — FENTANYL CITRATE 50 UG/ML
50 INJECTION, SOLUTION INTRAMUSCULAR; INTRAVENOUS
Status: COMPLETED | OUTPATIENT
Start: 2019-05-04 | End: 2019-05-04

## 2019-05-04 RX ADMIN — Medication 10 ML: at 19:43

## 2019-05-04 RX ADMIN — IOPAMIDOL 100 ML: 755 INJECTION, SOLUTION INTRAVENOUS at 19:43

## 2019-05-04 RX ADMIN — FENTANYL CITRATE 50 MCG: 50 INJECTION, SOLUTION INTRAMUSCULAR; INTRAVENOUS at 18:18

## 2019-05-04 NOTE — ED NOTES
Assumed care of patient from triage. Patient reports right scrotum pain radiating to right groin x 3 days. Patient states he was recently constipated and associated increased pain with bearing down. Patient denies difficulty/pain w/ urination. Patient a/o x 4.

## 2019-05-04 NOTE — ED PROVIDER NOTES
EMERGENCY DEPARTMENT HISTORY AND PHYSICAL EXAM      Date: 5/4/2019  Patient Name: Jessica Lomeli    History of Presenting Illness     Chief Complaint   Patient presents with    Groin Pain     R testicle x 2 days. reports that he strained d/t constipation the other day and his testicle has been firm ever since. denies discoloration       History Provided By: Patient    HPI: Jessica Lomeli, 68 y.o. male presents ambulatory to the ED with cc of 2 days of 8 out of 10 constant, aching right groin pain that radiates to the right testicle that is no better with his prescribed oxycodone and which started while straining during a bowel movement the other day. He denies nausea, vomiting or flank pain. There are no other complaints, changes, or physical findings at this time. PCP: Jeniffer Benitez MD    Current Outpatient Medications   Medication Sig Dispense Refill    docusate sodium (COLACE) 100 mg capsule Take 1 Cap by mouth two (2) times a day. 30 Cap 0    azithromycin (ZITHROMAX) 250 mg tablet Take 1 Tab by mouth See Admin Instructions. 6 Tab 0    zolpidem (AMBIEN) 5 mg tablet TAKE 1 TABLET BY MOUTH NIGHTLY *DO  NOT  EXCEED  DAILY  AMOUNT  5MG* 90 Tab 1    diclofenac EC (VOLTAREN) 75 mg EC tablet Take 1 Tab by mouth two (2) times a day. 60 Tab 0    secukinumab (COSENTYX PEN) 150 mg/mL pnij by SubCUTAneous route.  atorvastatin (LIPITOR) 20 mg tablet Take 1 Tab by mouth nightly. 30 Tab 6    metoprolol succinate (TOPROL-XL) 25 mg XL tablet Take 0.5 Tabs by mouth daily. 30 Tab 6    aspirin delayed-release 81 mg tablet Take  by mouth daily.  doxazosin (CARDURA) 4 mg tablet Take 4 mg by mouth nightly. Indications: BENIGN PROSTATIC HYPERTROPHY      oxybutynin chloride XL 10 mg CR tablet Take 10 mg by mouth nightly.  finasteride (PROSCAR) 5 mg tablet Take 5 mg by mouth nightly.  folic acid (FOLVITE) 1 mg tablet Take 1 mg by mouth daily.       methotrexate (RHEUMATREX) 2.5 mg tablet Take 15 mg by mouth every .  oxyCODONE-acetaminophen (PERCOCET 7.5) 7.5-325 mg per tablet Take 1 Tab by mouth two (2) times a day. Past History     Past Medical History:  Past Medical History:   Diagnosis Date    BPH with obstruction/lower urinary tract symptoms 3/23/2018    Chronic insomnia 3/23/2018    Coronary artery disease involving native coronary artery 3/23/2018    Status post stents ×3    Hypercholesterolemia 3/23/2018    Immunosuppression due to drug therapy 4/10/2019    Primary open angle glaucoma of right eye 3/23/2018    PSA elevation 3/23/2018    Status post biopsy. Atypical small acinar proliferation    Psoriatic arthritis [facility])        Past Surgical History:  Past Surgical History:   Procedure Laterality Date    COLONOSCOPY N/A 2018    COLONOSCOPY performed by Jesus Perez MD at [facility] ENDOSCOPY    HX KNEE ARTHROSCOPY Bilateral     HX ORTHOPAEDIC Right     Cubital tunnel sx    IR INJ FORAMIN EPID LUMB ANES/STER ADDL  2019    IR INJ FORAMIN EPID LUMB ANES/STER SNGL  2019       Family History:  Family History   Problem Relation Age of Onset    Bleeding Prob Mother     No Known Problems Father     Heart Disease Brother        Social History:  Social History     Tobacco Use    Smoking status: Former Smoker     Last attempt to quit: 1972     Years since quittin.3    Smokeless tobacco: Never Used   Substance Use Topics    Alcohol use: No    Drug use: No       Allergies:  No Known Allergies  Review of Systems   Review of Systems   Constitutional: Negative for fatigue and fever. HENT: Negative for congestion, ear pain and rhinorrhea. Eyes: Negative for pain and redness. Respiratory: Negative for cough and wheezing. Cardiovascular: Negative for chest pain and palpitations. Gastrointestinal: Negative for abdominal pain, nausea and vomiting. Genitourinary: Negative for dysuria, frequency and urgency.         Right inguinal/groin pain Musculoskeletal: Negative for back pain, neck pain and neck stiffness. Skin: Negative for rash and wound. Neurological: Negative for weakness, light-headedness, numbness and headaches. Physical Exam   Physical Exam   Constitutional: He is oriented to person, place, and time. He appears well-developed and well-nourished. Non-toxic appearance. No distress. HENT:   Head: Normocephalic and atraumatic. Head is without right periorbital erythema and without left periorbital erythema. Right Ear: External ear normal.   Left Ear: External ear normal.   Nose: Nose normal.   Mouth/Throat: Uvula is midline. No trismus in the jaw. Eyes: Pupils are equal, round, and reactive to light. Conjunctivae and EOM are normal. No scleral icterus. Neck: Normal range of motion and full passive range of motion without pain. Cardiovascular: Normal rate, regular rhythm and normal heart sounds. Pulmonary/Chest: Effort normal and breath sounds normal. No accessory muscle usage. No tachypnea. No respiratory distress. He has no decreased breath sounds. He has no wheezes. Abdominal: Soft. There is no tenderness. There is no rigidity and no guarding. Genitourinary:         Genitourinary Comments: Testicles are descended bilaterally  No redness, swelling or tenderness of the testicles  Right inguinal pain with palpation of the inguinal canal  No definite hernia is appreciated on exam   Musculoskeletal: Normal range of motion. Neurological: He is alert and oriented to person, place, and time. He is not disoriented. No cranial nerve deficit or sensory deficit. GCS eye subscore is 4. GCS verbal subscore is 5. GCS motor subscore is 6. Skin: Skin is intact. No rash noted. Psychiatric: He has a normal mood and affect. His speech is normal.   Nursing note and vitals reviewed.     Diagnostic Study Results     Labs -     Recent Results (from the past 12 hour(s))   URINALYSIS W/ REFLEX CULTURE    Collection Time: 05/04/19  6:08 PM   Result Value Ref Range    Color YELLOW/STRAW      Appearance CLEAR CLEAR      Specific gravity 1.021 1.003 - 1.030      pH (UA) 6.0 5.0 - 8.0      Protein NEGATIVE  NEG mg/dL    Glucose NEGATIVE  NEG mg/dL    Ketone NEGATIVE  NEG mg/dL    Bilirubin NEGATIVE  NEG      Blood NEGATIVE  NEG      Urobilinogen 1.0 0.2 - 1.0 EU/dL    Nitrites NEGATIVE  NEG      Leukocyte Esterase NEGATIVE  NEG      WBC 0-4 0 - 4 /hpf    RBC 0-5 0 - 5 /hpf    Epithelial cells FEW FEW /lpf    Bacteria NEGATIVE  NEG /hpf    UA:UC IF INDICATED CULTURE NOT INDICATED BY UA RESULT CNI      Mucus 1+ (A) NEG /lpf   CBC WITH AUTOMATED DIFF    Collection Time: 05/04/19  6:08 PM   Result Value Ref Range    WBC 6.1 4.1 - 11.1 K/uL    RBC 4.60 4.10 - 5.70 M/uL    HGB 13.8 12.1 - 17.0 g/dL    HCT 39.9 36.6 - 50.3 %    MCV 86.7 80.0 - 99.0 FL    MCH 30.0 26.0 - 34.0 PG    MCHC 34.6 30.0 - 36.5 g/dL    RDW 13.1 11.5 - 14.5 %    PLATELET 581 597 - 322 K/uL    MPV 11.3 8.9 - 12.9 FL    NRBC 0.0 0  WBC    ABSOLUTE NRBC 0.00 0.00 - 0.01 K/uL    NEUTROPHILS 62 32 - 75 %    LYMPHOCYTES 26 12 - 49 %    MONOCYTES 10 5 - 13 %    EOSINOPHILS 1 0 - 7 %    BASOPHILS 1 0 - 1 %    IMMATURE GRANULOCYTES 0 0.0 - 0.5 %    ABS. NEUTROPHILS 3.7 1.8 - 8.0 K/UL    ABS. LYMPHOCYTES 1.6 0.8 - 3.5 K/UL    ABS. MONOCYTES 0.6 0.0 - 1.0 K/UL    ABS. EOSINOPHILS 0.1 0.0 - 0.4 K/UL    ABS. BASOPHILS 0.1 0.0 - 0.1 K/UL    ABS. IMM.  GRANS. 0.0 0.00 - 0.04 K/UL    DF AUTOMATED     METABOLIC PANEL, COMPREHENSIVE    Collection Time: 05/04/19  6:08 PM   Result Value Ref Range    Sodium 140 136 - 145 mmol/L    Potassium 4.2 3.5 - 5.1 mmol/L    Chloride 107 97 - 108 mmol/L    CO2 30 21 - 32 mmol/L    Anion gap 3 (L) 5 - 15 mmol/L    Glucose 100 65 - 100 mg/dL    BUN 12 6 - 20 MG/DL    Creatinine 0.95 0.70 - 1.30 MG/DL    BUN/Creatinine ratio 13 12 - 20      GFR est AA >60 >60 ml/min/1.73m2    GFR est non-AA >60 >60 ml/min/1.73m2    Calcium 8.7 8.5 - 10.1 MG/DL    Bilirubin, total 0.9 0.2 - 1.0 MG/DL    ALT (SGPT) 37 12 - 78 U/L    AST (SGOT) 22 15 - 37 U/L    Alk. phosphatase 118 (H) 45 - 117 U/L    Protein, total 7.2 6.4 - 8.2 g/dL    Albumin 4.1 3.5 - 5.0 g/dL    Globulin 3.1 2.0 - 4.0 g/dL    A-G Ratio 1.3 1.1 - 2.2         Radiologic Studies -   CT ABD PELV W CONT   Final Result   IMPRESSION:   No acute findings. Incidentals as above. No change since prior examination. XR ABD ACUTE W 1 V CHEST   Final Result    impression: Mild fecal stasis . US SCROTUM/TESTICLES   Final Result   IMPRESSION:    1. No evidence of testicular torsion or other acute abnormality. 2. Small bilateral hydroceles. CT Results  (Last 48 hours)               05/04/19 1943  CT ABD PELV W CONT Final result    Impression:  IMPRESSION:   No acute findings. Incidentals as above. No change since prior examination. Narrative:  EXAM: CT ABD PELV W CONT       INDICATION: Hernia; Right groin / RLQ pain       COMPARISON: 2009        CONTRAST: 98 mL of Isovue-370. TECHNIQUE:    Following the uneventful intravenous administration of contrast, thin axial   images were obtained through the abdomen and pelvis. Coronal and sagittal   reconstructions were generated. Oral contrast was not administered. CT dose   reduction was achieved through use of a standardized protocol tailored for this   examination and automatic exposure control for dose modulation. FINDINGS:    LUNG BASES: Clear. INCIDENTALLY IMAGED HEART AND MEDIASTINUM: Unremarkable. LIVER: No mass or biliary dilatation. Hepatic hemangiomas   GALLBLADDER: Unremarkable. SPLEEN: No mass. PANCREAS: No mass or ductal dilatation. ADRENALS: Unremarkable. KIDNEYS: No mass, calculus, or hydronephrosis. Right renal cyst   STOMACH: Unremarkable. SMALL BOWEL: No dilatation or wall thickening. COLON: No dilatation or wall thickening. Sigmoid diverticulosis. APPENDIX: Unremarkable. PERITONEUM: No ascites or pneumoperitoneum. RETROPERITONEUM: No lymphadenopathy or aortic aneurysm. Currently enlarged prostate. URINARY BLADDER: Mild wall thickening may be related to chronic outlet   obstruction. BONES: No destructive bone lesion. ADDITIONAL COMMENTS small fat-containing right inguinal hernia               CXR Results  (Last 48 hours)               05/04/19 1735  XR ABD ACUTE W 1 V CHEST Final result    Impression:   impression: Mild fecal stasis . Narrative:  Clinical indication: Right lower quadrant pain. Frontal view of the chest, supine and upright views of the abdomen obtained. Mild fecal stasis. No free air, nonspecific gas pattern. Medical Decision Making   I am the first provider for this patient. I reviewed the vital signs, available nursing notes, past medical history, past surgical history, family history and social history. Vital Signs-Reviewed the patient's vital signs. Patient Vitals for the past 12 hrs:   Temp Pulse Resp BP SpO2   05/04/19 2002 -- 65 15 149/77 97 %   05/04/19 1528 -- -- -- 140/83 --   05/04/19 1527 98.5 °F (36.9 °C) 84 16 -- 99 %       Pulse Oximetry Analysis - 97% on RA    Records Reviewed: Nursing Notes, Old Medical Records, Previous Radiology Studies, Previous Laboratory Studies and     Provider Notes (Medical Decision Making):   DDx: Testicular torsion, epididymitis, UTI, prostatitis, inguinal hernia, spermatocele, hydrocele, varicocele, muscular strain    ED Course:   Initial assessment performed. The patients presenting problems have been discussed, and they are in agreement with the care plan formulated and outlined with them. I have encouraged them to ask questions as they arise throughout their visit. Disposition:  Discharge    PLAN:  1. Current Discharge Medication List      START taking these medications    Details   docusate sodium (COLACE) 100 mg capsule Take 1 Cap by mouth two (2) times a day. Qty: 30 Cap, Refills: 0           2.    Follow-up Information     Follow up With Specialties Details Why Contact Info    Norm Ornelas MD General Surgery, Breast Surgery, Colon and Rectal Surgery, Endocrinology Schedule an appointment as soon as possible for a visit GENERAL SURGERY: call Monday to schedule follow up [address] [facility] [address]  [phone]          Return to ED if worse     Diagnosis     Clinical Impression:   1. Right inguinal hernia    2.  Right groin pain

## 2019-05-05 NOTE — ED NOTES
Patient discharged by Eva CASTELLANOS. Patient ambulated with steady gait in NAD accompanied by brother upon departure.

## 2019-05-09 ENCOUNTER — OFFICE VISIT (OUTPATIENT)
Dept: SURGERY | Age: 69
End: 2019-05-09

## 2019-05-09 DIAGNOSIS — K40.90 RIGHT INGUINAL HERNIA: Primary | ICD-10-CM

## 2019-05-09 NOTE — PROGRESS NOTES
To: Guzman Chávez MD    From: Oren Piper MD    Thank you for sending Danielle Lyn to see us. Encounter Date: 5/9/2019  History and Physical    Assessment:   Tender small right inguinal hernia. Came on suddenly last week and is feeling better with time. Comorbid CAD, immunosuppressive meds for psoriasis. S/p no abd ops. Good functional status. Baby aspirin. No other anticoagulants. Plan:   Open repair with mesh due to risk of urinary retention with BPH and laparoscopic repair. Discussed possibility of incarceration, strangulation, increase in size of the hernia over time, and the risk of emergency surgery in the face of strangulation. Discussed techniques for reducing the hernia should it not reduce spontaneously. Knows to call immediately for incarceration. Also discussed the risk of surgery including recurrence, bleeding, hematoma, infection, difficulty voiding, chronic nerve pain, and the risks of general anesthetic. The patient expressed understanding of the risks and all questions were answered to the patient's satisfaction. HPI:   Maximus Smith is a 68 y.o. male who is seen in the ER for right groin pain that has been going on for about 1 weeks. Came on after straining to have a BM. Patient has a bulge. Bulge is reducible. Patient does not have urinary symptoms. Patient has difficulty with bowel movements. Patient does not have nausea or vomiting. Patient does not have history of previous hernia surgery. Patient does not have history of prior abdominal operations. Patient has history of prostate disease. BPH.     Past Medical History:   Diagnosis Date    BPH with obstruction/lower urinary tract symptoms 3/23/2018    Chronic insomnia 3/23/2018    Coronary artery disease involving native coronary artery 3/23/2018    Status post stents ×3    Hypercholesterolemia 3/23/2018    Immunosuppression due to drug therapy 4/10/2019    Primary open angle glaucoma of right eye 3/23/2018    PSA elevation 3/23/2018    Status post biopsy. Atypical small acinar proliferation    Psoriatic arthritis [facility])      Past Surgical History:   Procedure Laterality Date    COLONOSCOPY N/A 2018    [facility]h [facility]ilar [facility] at [facility] ENDOSCOPY    HX KNEE ARTHROSCOPY Bilateral     HX ORTHOPAEDIC Right     Cubital tunnel sx    IR INJ FORAMIN EPID LUMB ANES/STER ADDL  2019    IR INJ FORAMIN EPID LUMB ANES/STER SNGL  2019      Family History   Problem Relation Age of Onset    Bleeding Prob Mother     No Known Problems Father     Heart Disease Brother       Social History     Tobacco Use    Smoking status: Former Smoker     Last attempt to quit: 1972     Years since quittin.3    Smokeless tobacco: Never Used   Substance Use Topics    Alcohol use: No      Current Outpatient Medications   Medication Sig    docusate sodium (COLACE) 100 mg capsule Take 1 Cap by mouth two (2) times a day.  azithromycin (ZITHROMAX) 250 mg tablet Take 1 Tab by mouth See Admin Instructions.  zolpidem (AMBIEN) 5 mg tablet TAKE 1 TABLET BY MOUTH NIGHTLY *DO  NOT  EXCEED  DAILY  AMOUNT  5MG*    diclofenac EC (VOLTAREN) 75 mg EC tablet Take 1 Tab by mouth two (2) times a day.  secukinumab (COSENTYX PEN) 150 mg/mL pnij by SubCUTAneous route.  atorvastatin (LIPITOR) 20 mg tablet Take 1 Tab by mouth nightly.  metoprolol succinate (TOPROL-XL) 25 mg XL tablet Take 0.5 Tabs by mouth daily.  aspirin delayed-release 81 mg tablet Take  by mouth daily.  doxazosin (CARDURA) 4 mg tablet Take 4 mg by mouth nightly. Indications: BENIGN PROSTATIC HYPERTROPHY    oxybutynin chloride XL 10 mg CR tablet Take 10 mg by mouth nightly.  finasteride (PROSCAR) 5 mg tablet Take 5 mg by mouth nightly.  folic acid (FOLVITE) 1 mg tablet Take 1 mg by mouth daily.  methotrexate (RHEUMATREX) 2.5 mg tablet Take 15 mg by mouth every .  oxyCODONE-acetaminophen (PERCOCET 7.5) 7.5-325 mg per tablet Take 1 Tab by mouth two (2) times a day. No current facility-administered medications for this visit. Allergies:  No Known Allergies     Review of Systems:  10 systems reviewed. See scanned sheet in \"Media\" section. See HPI for pertinent positives and negatives. Objective: There were no vitals taken for this visit. Physical Exam:  General appearance  Alert, cooperative, no distress, appears stated age   HEENT Anicteric   Neck Supple   Back   No CVA tenderness   Lungs   Clear to auscultation bilaterally   Heart  Regular rate and rhythm. No murmur, rub or gallop   Abdomen   Soft. Bowel sounds normal. No organomegaly. Small tender RIH. No LIH.      Extremities no cyanosis or edema   Pulses 2+ right radial   Skin Skin color, texture, turgor normal.   Lymph nodes Inguinal nodes normal.   Neurologic Without overt sensory or motor deficit     Signed By: Ashley Winslow MD     May 9, 2019

## 2019-05-21 ENCOUNTER — TELEPHONE (OUTPATIENT)
Dept: SURGERY | Age: 69
End: 2019-05-21

## 2019-05-21 NOTE — TELEPHONE ENCOUNTER
Patient is thinking of scheduling surgery, but would like for Dr Tha Wadsworth to call him prior to scheduling. He has a question that he only wants to discuss with the doctor.

## 2019-05-22 NOTE — TELEPHONE ENCOUNTER
Spoke to patient using 2 identifiers. Informed patient that he's schedule for Hernia surgery on 5/29/19. Patient made aware that pre-surgical instructions, along with location of surgery, and Dr. Monreal's card with contact information will be mailed out on today. ( Mailing address was confirmed with patient )    Patient was asked to contact office should he have any further questions. Patient voiced understanding. Upon patient's request from his Cardiologist, received a copy of EKG.

## 2019-05-23 RX ORDER — ASCORBIC ACID 500 MG
1000 TABLET ORAL DAILY
COMMUNITY

## 2019-05-23 RX ORDER — CHOLECALCIFEROL TAB 125 MCG (5000 UNIT) 125 MCG
5000 TAB ORAL DAILY
COMMUNITY

## 2019-05-23 NOTE — PERIOP NOTES
[facility]  Preoperative Instructions        Surgery Date 5/29/19          Time of Arrival 1400   Contact # [phone]    1. On the day of your surgery, please report to the Surgical Services Registration Desk and sign in at your designated time. The Surgery Center is located to the right of the Emergency Room. 2. You must have someone with you to drive you home. You should not drive a car for 24 hours following surgery. Please make arrangements for a friend or family member to stay with you for the first 24 hours after your surgery. 3. Do not have anything to eat or drink (including water, gum, mints, coffee, juice) after midnight ??5/28/19 . ? This may not apply to medications prescribed by your physician. ?(Please note below the special instructions with medications to take the morning of your procedure.)    4. We recommend you do not drink any alcoholic beverages for 24 hours before and after your surgery. 5. Contact your surgeons office for instructions on the following medications: non-steroidal anti-inflammatory drugs (i.e. Advil, Aleve), vitamins, and supplements. (Some surgeons will want you to stop these medications prior to surgery and others may allow you to take them)  **If you are currently taking Plavix, Coumadin, Aspirin and/or other blood-thinning agents, contact your surgeon for instructions. ** Your surgeon will partner with the physician prescribing these medications to determine if it is safe to stop or if you need to continue taking. Please do not stop taking these medications without instructions from your surgeon    6. Wear comfortable clothes. Wear glasses instead of contacts. Do not bring any money or jewelry. Please bring picture ID, insurance card, and any prearranged co-payment or hospital payment. Do not wear make-up, particularly mascara the morning of your surgery. Do not wear nail polish, particularly if you are having foot /hand surgery. Wear your hair loose or down, no ponytails, buns, kiera pins or clips. All body piercings must be removed. Please shower with antibacterial soap for three consecutive days before and on the morning of surgery, but do not apply any lotions, powders or deodorants after the shower on the day of surgery. Please use a fresh towels after each shower. Please sleep in clean clothes and change bed linens the night before surgery. Please do not shave for 48 hours prior to surgery. Shaving of the face is acceptable. 7. You should understand that if you do not follow these instructions your surgery may be cancelled. If your physical condition changes (I.e. fever, cold or flu) please contact your surgeon as soon as possible. 8. It is important that you be on time. If a situation occurs where you may be late, please call [phone] (OR Holding Area). 9. If you have any questions and or problems, please call [phone] (Pre-admission Testing). 10. Your surgery time may be subject to change. You will receive a phone call the evening prior if your time changes. 11.  If having outpatient surgery, you must have someone to drive you here, stay with you during the duration of your stay, and to drive you home at time of discharge. 12.   In an effort to improve the efficiency, privacy, and safety for all of our Pre-op patients visitors are not allowed in the Holding area. Once you arrive and are registered your family/visitors will be asked to remain in the waiting room. The Pre-op staff will get you from the Surgical Waiting Area and will explain to you and your family/visitors that the Pre-op phase is beginning. The staff will answer any questions and provide instructions for tracking of the patient, by use of the existing tracking number and color-coded status board in the waiting room.   At this time the staff will also ask for your designated spokesperson information in the event that the physician or staff need to provide an update or obtain any pertinent information. The designated spokesperson will be notified if the physician needs to speak to family during the pre-operative phase. If at any time your family/visitors has questions or concerns they may approach the volunteer desk in the waiting area for assistance. Special Instructions: Please call rheumatologist regarding methotrexate and cosentyx and follow instructions before and after surgery. MEDICATIONS TO TAKE THE MORNING OF SURGERY WITH A SIP OF WATER: None      I understand a pre-operative phone call will be made to verify my surgery time. In the event that I am not available, I give permission for a message to be left on my answering service and/or with another person?   yes         ___________________      __________   _________    (Signature of Patient)             (Witness)                (Date and Time)

## 2019-05-23 NOTE — PERIOP NOTES
Patient taking methotrexate weekly and cosentyx injections monthly. Pt instructed to called Dr. Mckenzie Izquierdo for pre and post op instructions. Also notified Renan Okeefe with Dr. Marianne Alanis for any follow-up needed.

## 2019-05-28 ENCOUNTER — ANESTHESIA EVENT (OUTPATIENT)
Dept: SURGERY | Age: 69
End: 2019-05-28
Payer: MEDICARE

## 2019-05-29 ENCOUNTER — HOSPITAL ENCOUNTER (OUTPATIENT)
Age: 69
Setting detail: OUTPATIENT SURGERY
Discharge: HOME | End: 2019-05-29
Attending: SURGERY | Admitting: SURGERY
Payer: MEDICARE

## 2019-05-29 ENCOUNTER — ANESTHESIA (OUTPATIENT)
Dept: SURGERY | Age: 69
End: 2019-05-29
Payer: MEDICARE

## 2019-05-29 VITALS
OXYGEN SATURATION: 98 % | SYSTOLIC BLOOD PRESSURE: 110 MMHG | RESPIRATION RATE: 20 BRPM | DIASTOLIC BLOOD PRESSURE: 60 MMHG | TEMPERATURE: 98 F | WEIGHT: 177.69 LBS | HEART RATE: 77 BPM | BODY MASS INDEX: 24.07 KG/M2 | HEIGHT: 72 IN

## 2019-05-29 DIAGNOSIS — K40.90 RIGHT INGUINAL HERNIA: Primary | ICD-10-CM

## 2019-05-29 PROCEDURE — 76010000149 HC OR TIME 1 TO 1.5 HR: Performed by: SURGERY

## 2019-05-29 PROCEDURE — 76210000020 HC REC RM PH II FIRST 0.5 HR: Performed by: SURGERY

## 2019-05-29 PROCEDURE — 74011000250 HC RX REV CODE- 250: Performed by: SURGERY

## 2019-05-29 PROCEDURE — 88302 TISSUE EXAM BY PATHOLOGIST: CPT

## 2019-05-29 PROCEDURE — 77030003028 HC SUT VCRL J&J -A: Performed by: SURGERY

## 2019-05-29 PROCEDURE — 74011250637 HC RX REV CODE- 250/637: Performed by: ANESTHESIOLOGY

## 2019-05-29 PROCEDURE — 76210000006 HC OR PH I REC 0.5 TO 1 HR: Performed by: SURGERY

## 2019-05-29 PROCEDURE — 77030011640 HC PAD GRND REM COVD -A: Performed by: SURGERY

## 2019-05-29 PROCEDURE — 74011000272 HC RX REV CODE- 272: Performed by: SURGERY

## 2019-05-29 PROCEDURE — 74011250636 HC RX REV CODE- 250/636: Performed by: ANESTHESIOLOGY

## 2019-05-29 PROCEDURE — 77030013079 HC BLNKT BAIR HGGR 3M -A: Performed by: NURSE ANESTHETIST, CERTIFIED REGISTERED

## 2019-05-29 PROCEDURE — 77030026438 HC STYL ET INTUB CARD -A: Performed by: NURSE ANESTHETIST, CERTIFIED REGISTERED

## 2019-05-29 PROCEDURE — 74011000250 HC RX REV CODE- 250

## 2019-05-29 PROCEDURE — 77030008684 HC TU ET CUF COVD -B: Performed by: NURSE ANESTHETIST, CERTIFIED REGISTERED

## 2019-05-29 PROCEDURE — 77030002933 HC SUT MCRYL J&J -A: Performed by: SURGERY

## 2019-05-29 PROCEDURE — 74011250636 HC RX REV CODE- 250/636

## 2019-05-29 PROCEDURE — 77030039266 HC ADH SKN EXOFIN S2SG -A: Performed by: SURGERY

## 2019-05-29 PROCEDURE — 77030031139 HC SUT VCRL2 J&J -A: Performed by: SURGERY

## 2019-05-29 PROCEDURE — 74011250636 HC RX REV CODE- 250/636: Performed by: SURGERY

## 2019-05-29 PROCEDURE — 76060000033 HC ANESTHESIA 1 TO 1.5 HR: Performed by: SURGERY

## 2019-05-29 PROCEDURE — C1781 MESH (IMPLANTABLE): HCPCS | Performed by: SURGERY

## 2019-05-29 PROCEDURE — 77030002966 HC SUT PDS J&J -A: Performed by: SURGERY

## 2019-05-29 PROCEDURE — 77030032490 HC SLV COMPR SCD KNE COVD -B: Performed by: SURGERY

## 2019-05-29 DEVICE — MESH HERN W3XL4.75IN L PLA PRECUT FLAP STYL PARTIALLY ABSRB: Type: IMPLANTABLE DEVICE | Site: INGUINAL | Status: FUNCTIONAL

## 2019-05-29 RX ORDER — OXYCODONE HYDROCHLORIDE 5 MG/1
5 TABLET ORAL
Qty: 30 TAB | Refills: 0 | Status: SHIPPED | OUTPATIENT
Start: 2019-05-29 | End: 2019-06-03

## 2019-05-29 RX ORDER — KETOROLAC TROMETHAMINE 30 MG/ML
15 INJECTION, SOLUTION INTRAMUSCULAR; INTRAVENOUS ONCE
Status: COMPLETED | OUTPATIENT
Start: 2019-05-29 | End: 2019-05-29

## 2019-05-29 RX ORDER — PROPOFOL 10 MG/ML
INJECTION, EMULSION INTRAVENOUS AS NEEDED
Status: DISCONTINUED | OUTPATIENT
Start: 2019-05-29 | End: 2019-05-29 | Stop reason: HOSPADM

## 2019-05-29 RX ORDER — DEXAMETHASONE SODIUM PHOSPHATE 4 MG/ML
INJECTION, SOLUTION INTRA-ARTICULAR; INTRALESIONAL; INTRAMUSCULAR; INTRAVENOUS; SOFT TISSUE AS NEEDED
Status: DISCONTINUED | OUTPATIENT
Start: 2019-05-29 | End: 2019-05-29 | Stop reason: HOSPADM

## 2019-05-29 RX ORDER — PHENYLEPHRINE HCL IN 0.9% NACL 0.4MG/10ML
SYRINGE (ML) INTRAVENOUS AS NEEDED
Status: DISCONTINUED | OUTPATIENT
Start: 2019-05-29 | End: 2019-05-29 | Stop reason: HOSPADM

## 2019-05-29 RX ORDER — FENTANYL CITRATE 50 UG/ML
25 INJECTION, SOLUTION INTRAMUSCULAR; INTRAVENOUS
Status: DISCONTINUED | OUTPATIENT
Start: 2019-05-29 | End: 2019-05-29 | Stop reason: HOSPADM

## 2019-05-29 RX ORDER — FENTANYL CITRATE 50 UG/ML
INJECTION, SOLUTION INTRAMUSCULAR; INTRAVENOUS AS NEEDED
Status: DISCONTINUED | OUTPATIENT
Start: 2019-05-29 | End: 2019-05-29 | Stop reason: HOSPADM

## 2019-05-29 RX ORDER — NALOXONE HYDROCHLORIDE 4 MG/.1ML
SPRAY NASAL
Qty: 2 EACH | Refills: 0 | Status: SHIPPED | OUTPATIENT
Start: 2019-05-29 | End: 2021-09-20 | Stop reason: SDUPTHER

## 2019-05-29 RX ORDER — SUCCINYLCHOLINE CHLORIDE 20 MG/ML
INJECTION INTRAMUSCULAR; INTRAVENOUS AS NEEDED
Status: DISCONTINUED | OUTPATIENT
Start: 2019-05-29 | End: 2019-05-29 | Stop reason: HOSPADM

## 2019-05-29 RX ORDER — SODIUM CHLORIDE, SODIUM LACTATE, POTASSIUM CHLORIDE, CALCIUM CHLORIDE 600; 310; 30; 20 MG/100ML; MG/100ML; MG/100ML; MG/100ML
25 INJECTION, SOLUTION INTRAVENOUS CONTINUOUS
Status: DISCONTINUED | OUTPATIENT
Start: 2019-05-29 | End: 2019-05-29 | Stop reason: HOSPADM

## 2019-05-29 RX ORDER — POLYETHYLENE GLYCOL 3350 17 G/17G
17 POWDER, FOR SOLUTION ORAL DAILY
Qty: 510 G | Refills: 0 | Status: SHIPPED | OUTPATIENT
Start: 2019-05-29 | End: 2019-06-24 | Stop reason: ALTCHOICE

## 2019-05-29 RX ORDER — HYDROMORPHONE HYDROCHLORIDE 2 MG/ML
INJECTION, SOLUTION INTRAMUSCULAR; INTRAVENOUS; SUBCUTANEOUS AS NEEDED
Status: DISCONTINUED | OUTPATIENT
Start: 2019-05-29 | End: 2019-05-29 | Stop reason: HOSPADM

## 2019-05-29 RX ORDER — MORPHINE SULFATE 10 MG/ML
2 INJECTION, SOLUTION INTRAMUSCULAR; INTRAVENOUS
Status: COMPLETED | OUTPATIENT
Start: 2019-05-29 | End: 2019-05-29

## 2019-05-29 RX ORDER — MIDAZOLAM HYDROCHLORIDE 1 MG/ML
1 INJECTION, SOLUTION INTRAMUSCULAR; INTRAVENOUS AS NEEDED
Status: DISCONTINUED | OUTPATIENT
Start: 2019-05-29 | End: 2019-05-29 | Stop reason: HOSPADM

## 2019-05-29 RX ORDER — LIDOCAINE HYDROCHLORIDE 10 MG/ML
0.1 INJECTION, SOLUTION EPIDURAL; INFILTRATION; INTRACAUDAL; PERINEURAL AS NEEDED
Status: DISCONTINUED | OUTPATIENT
Start: 2019-05-29 | End: 2019-05-29 | Stop reason: HOSPADM

## 2019-05-29 RX ORDER — ONDANSETRON 2 MG/ML
4 INJECTION INTRAMUSCULAR; INTRAVENOUS AS NEEDED
Status: DISCONTINUED | OUTPATIENT
Start: 2019-05-29 | End: 2019-05-29 | Stop reason: HOSPADM

## 2019-05-29 RX ORDER — GLYCOPYRROLATE 0.2 MG/ML
INJECTION INTRAMUSCULAR; INTRAVENOUS AS NEEDED
Status: DISCONTINUED | OUTPATIENT
Start: 2019-05-29 | End: 2019-05-29 | Stop reason: HOSPADM

## 2019-05-29 RX ORDER — KETOROLAC TROMETHAMINE 30 MG/ML
INJECTION, SOLUTION INTRAMUSCULAR; INTRAVENOUS
Status: COMPLETED
Start: 2019-05-29 | End: 2019-05-29

## 2019-05-29 RX ORDER — ROCURONIUM BROMIDE 10 MG/ML
INJECTION, SOLUTION INTRAVENOUS AS NEEDED
Status: DISCONTINUED | OUTPATIENT
Start: 2019-05-29 | End: 2019-05-29 | Stop reason: HOSPADM

## 2019-05-29 RX ORDER — IBUPROFEN 600 MG/1
600 TABLET ORAL
Qty: 21 TAB | Refills: 0 | Status: SHIPPED | OUTPATIENT
Start: 2019-05-29 | End: 2019-06-24 | Stop reason: SDUPTHER

## 2019-05-29 RX ORDER — EPHEDRINE SULFATE/0.9% NACL/PF 50 MG/5 ML
SYRINGE (ML) INTRAVENOUS AS NEEDED
Status: DISCONTINUED | OUTPATIENT
Start: 2019-05-29 | End: 2019-05-29 | Stop reason: HOSPADM

## 2019-05-29 RX ORDER — CEFAZOLIN SODIUM/WATER 2 G/20 ML
2 SYRINGE (ML) INTRAVENOUS
Status: COMPLETED | OUTPATIENT
Start: 2019-05-29 | End: 2019-05-29

## 2019-05-29 RX ORDER — FENTANYL CITRATE 50 UG/ML
50 INJECTION, SOLUTION INTRAMUSCULAR; INTRAVENOUS AS NEEDED
Status: COMPLETED | OUTPATIENT
Start: 2019-05-29 | End: 2019-05-29

## 2019-05-29 RX ORDER — ACETAMINOPHEN 500 MG
1000 TABLET ORAL ONCE
Status: COMPLETED | OUTPATIENT
Start: 2019-05-29 | End: 2019-05-29

## 2019-05-29 RX ORDER — LIDOCAINE HYDROCHLORIDE 20 MG/ML
INJECTION, SOLUTION EPIDURAL; INFILTRATION; INTRACAUDAL; PERINEURAL AS NEEDED
Status: DISCONTINUED | OUTPATIENT
Start: 2019-05-29 | End: 2019-05-29 | Stop reason: HOSPADM

## 2019-05-29 RX ORDER — NEOSTIGMINE METHYLSULFATE 1 MG/ML
INJECTION INTRAVENOUS AS NEEDED
Status: DISCONTINUED | OUTPATIENT
Start: 2019-05-29 | End: 2019-05-29 | Stop reason: HOSPADM

## 2019-05-29 RX ORDER — ONDANSETRON 2 MG/ML
INJECTION INTRAMUSCULAR; INTRAVENOUS AS NEEDED
Status: DISCONTINUED | OUTPATIENT
Start: 2019-05-29 | End: 2019-05-29 | Stop reason: SDUPTHER

## 2019-05-29 RX ADMIN — Medication 80 MCG: at 14:33

## 2019-05-29 RX ADMIN — ROCURONIUM BROMIDE 15 MG: 10 INJECTION, SOLUTION INTRAVENOUS at 14:10

## 2019-05-29 RX ADMIN — FENTANYL CITRATE 25 MCG: 50 INJECTION, SOLUTION INTRAMUSCULAR; INTRAVENOUS at 15:23

## 2019-05-29 RX ADMIN — GLYCOPYRROLATE 0.4 MG: 0.2 INJECTION INTRAMUSCULAR; INTRAVENOUS at 14:56

## 2019-05-29 RX ADMIN — FENTANYL CITRATE 25 MCG: 50 INJECTION, SOLUTION INTRAMUSCULAR; INTRAVENOUS at 15:26

## 2019-05-29 RX ADMIN — FENTANYL CITRATE 75 MCG: 50 INJECTION, SOLUTION INTRAMUSCULAR; INTRAVENOUS at 14:01

## 2019-05-29 RX ADMIN — MORPHINE SULFATE 2 MG: 10 INJECTION INTRAVENOUS at 15:50

## 2019-05-29 RX ADMIN — MEPERIDINE HYDROCHLORIDE 12.5 MG: 25 INJECTION, SOLUTION INTRAMUSCULAR; INTRAVENOUS; SUBCUTANEOUS at 15:20

## 2019-05-29 RX ADMIN — KETOROLAC TROMETHAMINE 15 MG: 30 INJECTION, SOLUTION INTRAMUSCULAR; INTRAVENOUS at 15:29

## 2019-05-29 RX ADMIN — Medication 5 MG: at 14:51

## 2019-05-29 RX ADMIN — SUCCINYLCHOLINE CHLORIDE 160 MG: 20 INJECTION INTRAMUSCULAR; INTRAVENOUS at 14:01

## 2019-05-29 RX ADMIN — MORPHINE SULFATE 2 MG: 10 INJECTION INTRAVENOUS at 15:34

## 2019-05-29 RX ADMIN — DEXAMETHASONE SODIUM PHOSPHATE 8 MG: 4 INJECTION, SOLUTION INTRA-ARTICULAR; INTRALESIONAL; INTRAMUSCULAR; INTRAVENOUS; SOFT TISSUE at 14:11

## 2019-05-29 RX ADMIN — FENTANYL CITRATE 25 MCG: 50 INJECTION, SOLUTION INTRAMUSCULAR; INTRAVENOUS at 15:20

## 2019-05-29 RX ADMIN — MORPHINE SULFATE 2 MG: 10 INJECTION INTRAVENOUS at 15:55

## 2019-05-29 RX ADMIN — ROCURONIUM BROMIDE 5 MG: 10 INJECTION, SOLUTION INTRAVENOUS at 14:01

## 2019-05-29 RX ADMIN — FENTANYL CITRATE 25 MCG: 50 INJECTION, SOLUTION INTRAMUSCULAR; INTRAVENOUS at 13:56

## 2019-05-29 RX ADMIN — MORPHINE SULFATE 2 MG: 10 INJECTION INTRAVENOUS at 15:40

## 2019-05-29 RX ADMIN — Medication 5 MG: at 14:15

## 2019-05-29 RX ADMIN — NEOSTIGMINE METHYLSULFATE 2 MG: 1 INJECTION INTRAVENOUS at 14:56

## 2019-05-29 RX ADMIN — ACETAMINOPHEN 1000 MG: 500 TABLET ORAL at 13:43

## 2019-05-29 RX ADMIN — SODIUM CHLORIDE, SODIUM LACTATE, POTASSIUM CHLORIDE, AND CALCIUM CHLORIDE 25 ML/HR: 600; 310; 30; 20 INJECTION, SOLUTION INTRAVENOUS at 12:44

## 2019-05-29 RX ADMIN — Medication 80 MCG: at 14:51

## 2019-05-29 RX ADMIN — FENTANYL CITRATE 25 MCG: 50 INJECTION, SOLUTION INTRAMUSCULAR; INTRAVENOUS at 15:29

## 2019-05-29 RX ADMIN — KETOROLAC TROMETHAMINE 15 MG: 30 INJECTION, SOLUTION INTRAMUSCULAR at 15:29

## 2019-05-29 RX ADMIN — LIDOCAINE HYDROCHLORIDE 80 MG: 20 INJECTION, SOLUTION EPIDURAL; INFILTRATION; INTRACAUDAL; PERINEURAL at 14:01

## 2019-05-29 RX ADMIN — Medication 10 MG: at 14:20

## 2019-05-29 RX ADMIN — PROPOFOL 100 MG: 10 INJECTION, EMULSION INTRAVENOUS at 14:49

## 2019-05-29 RX ADMIN — HYDROMORPHONE HYDROCHLORIDE 0.4 MG: 2 INJECTION, SOLUTION INTRAMUSCULAR; INTRAVENOUS; SUBCUTANEOUS at 14:21

## 2019-05-29 RX ADMIN — ONDANSETRON 4 MG: 2 INJECTION INTRAMUSCULAR; INTRAVENOUS at 14:56

## 2019-05-29 RX ADMIN — PROPOFOL 50 MG: 10 INJECTION, EMULSION INTRAVENOUS at 14:03

## 2019-05-29 RX ADMIN — HYDROMORPHONE HYDROCHLORIDE 0.4 MG: 2 INJECTION, SOLUTION INTRAMUSCULAR; INTRAVENOUS; SUBCUTANEOUS at 14:31

## 2019-05-29 RX ADMIN — Medication 2 G: at 14:15

## 2019-05-29 RX ADMIN — FENTANYL CITRATE 25 MCG: 50 INJECTION, SOLUTION INTRAMUSCULAR; INTRAVENOUS at 14:15

## 2019-05-29 RX ADMIN — MORPHINE SULFATE 2 MG: 10 INJECTION INTRAVENOUS at 15:45

## 2019-05-29 RX ADMIN — PROPOFOL 150 MG: 10 INJECTION, EMULSION INTRAVENOUS at 14:01

## 2019-05-29 NOTE — DISCHARGE INSTRUCTIONS
Discharge Instructions: Hernia -- Dr. Lake Pak    Call on next business day to arrange appointment for follow up in 3 weeks -- [phone]. Activity:  Walk regularly. No lifting more than 10 pounds for 6 weeks. Light aerobic activity is okay when you feel up to it. You may resume driving in three days unless still requiring narcotics for pain. Return to work in 1-2 weeks but no heavy lifting for 6 weeks. Apply ice to areas of tenderness for 20 minutes at a time. Keep a cloth between the skin and the bag of ice. Work:  You may return to work in 1 or 2 weeks to light activity. No lifting more than 10 pounds (=\"light duty\") for 6 weeks. If your employer can not accommodate \"light duty,\" your employer will need to provide any necessary paperwork to our office. This document with serve as the initial \"note\" to your employer. Diet:  You may resume normal diet. Wound Care:  Glue dressing will fall off on its own. If you experience a lot of drainage, develop redness around the wound, or a fever over 101 F occurs please call the office. There will be significant swelling under the incision(s) that will develop over the next 3-4 days. Ice will help reduce this. Male patients may experience swelling and bruising of the scrotum -- this is normal.  However, if the scrotum becomes tense and painful you should call. Wear a jock strap/athletic supporter for the next week to reduce scrotal swelling. If you can't urinate within 8 hours, call. Intentionally urinate every 2 hours today, even if you don't feel like you have to go. You may shower. No baths or swimming for 3 weeks. Medications:  See attached \"Medication Reconciliation. \"    Resume home medications as indicated on the Medical Reconciliation form. Do not use blood thinners (such as Aspirin, Coumadin, or Plavix) until 3 days after surgery.       Pain medications:  Non steroidal antiinflammatories (ibuprofen -- Advil or Motrin) seem to work best for post surgical pain. Try these first.      PUT ICE ON YOUR INCISIONS 20 MINUTES EVERY HOUR WHILE THEY REMAIN SORE. PLACE A CLOTH BETWEEN YOUR SKIN THE THE ICE BAG. A narcotic prescription will also be given for breakthrough pain. Tylenol can be used in place of the narcotic. Colace or Miralax should be used twice daily to prevent constipation while on narcotics. If you are still having trouble having a BM after 1-2 days, try milk of magnesia. If this does not work within 24 hours, try a bottle of magnesium citrate. If this does not work, call us. Narcotics and anesthesia sometimes cause nausea and vomiting. If persistent please call the office. Do not hesitate to call with questions or concerns. Lisseth English MD  Tel [phone]  Fax [phone]      DISCHARGE SUMMARY from Nurse    PATIENT INSTRUCTIONS:    After general anesthesia or intravenous sedation, for 24 hours or while taking prescription Narcotics:  · Limit your activities  · Do not drive and operate hazardous machinery  · Do not make important personal or business decisions  · Do  not drink alcoholic beverages  · If you have not urinated within 8 hours after discharge, please contact your surgeon on call. Report the following to your surgeon:  · Excessive pain, swelling, redness or odor of or around the surgical area  · Temperature over 100.5  · Nausea and vomiting lasting longer than 4 hours or if unable to take medications  · Any signs of decreased circulation or nerve impairment to extremity: change in color, persistent  numbness, tingling, coldness or increase pain  · Any questions    *  Please give a list of your current medications to your Primary Care Provider. *  Please update this list whenever your medications are discontinued, doses are      changed, or new medications (including over-the-counter products) are added.     *  Please carry medication information at all times in case of emergency situations. These are general instructions for a healthy lifestyle:    No smoking/ No tobacco products/ Avoid exposure to second hand smoke  Surgeon General's Warning:  Quitting smoking now greatly reduces serious risk to your health. Obesity, smoking, and sedentary lifestyle greatly increases your risk for illness    A healthy diet, regular physical exercise & weight monitoring are important for maintaining a healthy lifestyle    You may be retaining fluid if you have a history of heart failure or if you experience any of the following symptoms:  Weight gain of 3 pounds or more overnight or 5 pounds in a week, increased swelling in our hands or feet or shortness of breath while lying flat in bed. Please call your doctor as soon as you notice any of these symptoms; do not wait until your next office visit. Recognize signs and symptoms of STROKE:    F-face looks uneven    A-arms unable to move or move unevenly    S-speech slurred or non-existent    T-time-call 911 as soon as signs and symptoms begin-DO NOT go       Back to bed or wait to see if you get better-TIME IS BRAIN. Warning Signs of HEART ATTACK     Call 911 if you have these symptoms:   Chest discomfort. Most heart attacks involve discomfort in the center of the chest that lasts more than a few minutes, or that goes away and comes back. It can feel like uncomfortable pressure, squeezing, fullness, or pain.  Discomfort in other areas of the upper body. Symptoms can include pain or discomfort in one or both arms, the back, neck, jaw, or stomach.  Shortness of breath with or without chest discomfort.  Other signs may include breaking out in a cold sweat, nausea, or lightheadedness. Don't wait more than five minutes to call 911 - MINUTES MATTER! Fast action can save your life. Calling 911 is almost always the fastest way to get lifesaving treatment.  Emergency Medical Services staff can begin treatment when they arrive -- up to an hour sooner than if someone gets to the hospital by car. The discharge information has been reviewed with the patient and Brother. The patient and Brother verbalized understanding. Discharge medications reviewed with the patient and Brother and appropriate educational materials and side effects teaching were provided. ___________________________________________________________________________________________________________________________________    Patient Education      Oxycodone, Rapid Release (ETH-Oxydose, Oxy IR, Roxicodone) - (By mouth)   Why this medicine is used:   Treats moderate to severe pain. This medicine is a narcotic pain reliever. Contact a nurse or doctor right away if you have:  · Fast or slow heart beat, shallow breathing, blue lips, skin or fingernails  · Anxiety, restlessness, fever, sweating, muscle spasms, twitching, seeing or hearing things that are not there  · Extreme weakness, shallow breathing, slow heartbeat  · Severe confusion, lightheadedness, dizziness, fainting  · Sweating or cold, clammy skin, seizures  · Severe constipation, stomach pain, nausea, vomiting     Common side effects:  · Mild constipation  · Sleepiness, tiredness  © 2017 [facility] Information is for End User's use only and may not be sold, redistributed or otherwise used for commercial purposes.

## 2019-05-29 NOTE — ROUTINE PROCESS
TRANSFER - IN REPORT:    Verbal report received from SLICK Cline RN(name) on Manfred Harvey  being received from OR(unit) for routine post - op      Report consisted of patients Situation, Background, Assessment and   Recommendations(SBAR). Information from the following report(s) OR Summary was reviewed with the receiving nurse. Opportunity for questions and clarification was provided. Assessment completed upon patients arrival to unit and care assumed.

## 2019-05-29 NOTE — ROUTINE PROCESS
Patient: Laya Dwyer MRN: [id number]  SSN: [id number]   YOB: 1950  Age: 68 y.o. Sex: male     Patient is status post Procedure(s):  OPEN RIGHT INGUINAL HERNIA REPAIR WITH MESH. Surgeon(s) and Role:     * Sabra Rodriguez MD - Primary    Local/Dose/Irrigation:  30 ml of 0.25% marcaine with epi injected into incision site                  Peripheral IV 05/29/19 Right Wrist (Active)   Site Assessment Clean, dry, & intact 5/29/2019  1:03 PM   Phlebitis Assessment 0 5/29/2019  1:03 PM   Infiltration Assessment 0 5/29/2019  1:03 PM   Dressing Status Clean, dry, & intact 5/29/2019  1:03 PM   Dressing Type Tape;Transparent 5/29/2019  1:03 PM   Hub Color/Line Status Pink; Infusing;Patent 5/29/2019  1:03 PM   Alcohol Cap Used Yes 5/29/2019  1:03 PM            Airway - Endotracheal Tube 05/29/19 Oral (Active)   Line Mandeep Lips 5/29/2019 12:00 AM                   Dressing/Packing:  Wound Abdomen Right-Dressing Type: Topical skin adhesive/glue (05/29/19 1445)    Splint/Cast:

## 2019-05-29 NOTE — ANESTHESIA PREPROCEDURE EVALUATION
Relevant Problems   No relevant active problems       Anesthetic History   No history of anesthetic complications            Review of Systems / Medical History  Patient summary reviewed, nursing notes reviewed and pertinent labs reviewed    Pulmonary  Within defined limits                 Neuro/Psych   Within defined limits           Cardiovascular              CAD, cardiac stents and hyperlipidemia    Exercise tolerance: >4 METS     GI/Hepatic/Renal  Within defined limits              Endo/Other  Within defined limits           Other Findings              Physical Exam    Airway  Mallampati: II  TM Distance: 4 - 6 cm  Neck ROM: normal range of motion   Mouth opening: Normal     Cardiovascular  Regular rate and rhythm,  S1 and S2 normal,  no murmur, click, rub, or gallop             Dental  No notable dental hx       Pulmonary  Breath sounds clear to auscultation               Abdominal  GI exam deferred       Other Findings            Anesthetic Plan    ASA: 2  Anesthesia type: general    Monitoring Plan: BIS      Induction: Intravenous  Anesthetic plan and risks discussed with: Patient

## 2019-05-29 NOTE — ANESTHESIA POSTPROCEDURE EVALUATION
Procedure(s):  OPEN RIGHT INGUINAL HERNIA REPAIR WITH MESH. general    Anesthesia Post Evaluation        Patient location during evaluation: PACU  Note status: Adequate. Level of consciousness: responsive to verbal stimuli and sleepy but conscious  Pain management: satisfactory to patient  Airway patency: patent  Anesthetic complications: no  Cardiovascular status: acceptable  Respiratory status: acceptable  Hydration status: acceptable  Comments: +Post-Anesthesia Evaluation and Assessment    Patient: Jocy Mcclure MRN: [id number]  SSN: [id number]   YOB: 1950  Age: 68 y.o. Sex: male      Cardiovascular Function/Vital Signs    /50   Pulse 80   Temp 36.4 °C (97.6 °F)   Resp 12   Ht 6' (1.829 m)   Wt 80.6 kg (177 lb 11.1 oz)   SpO2 98%   BMI 24.10 kg/m²     Patient is status post Procedure(s):  OPEN RIGHT INGUINAL HERNIA REPAIR WITH MESH. Nausea/Vomiting: Controlled. Postoperative hydration reviewed and adequate. Pain:  Pain Scale 1: Numeric (0 - 10) (05/29/19 1550)  Pain Intensity 1: 4 (05/29/19 1550)   Managed. Neurological Status:   Neuro (WDL): Exceptions to WDL (05/29/19 1510)   At baseline. Mental Status and Level of Consciousness: Arousable. Pulmonary Status:   O2 Device: Room air (05/29/19 1534)   Adequate oxygenation and airway patent. Complications related to anesthesia: None    Post-anesthesia assessment completed. No concerns. Signed By: Roberta Hebert MD    5/29/2019  Post anesthesia nausea and vomiting:  controlled      Vitals Value Taken Time   /50 5/29/2019  3:45 PM   Temp 36.4 °C (97.6 °F) 5/29/2019  3:13 PM   Pulse 84 5/29/2019  3:51 PM   Resp 17 5/29/2019  3:51 PM   SpO2 99 % 5/29/2019  3:51 PM   Vitals shown include unvalidated device data.

## 2019-05-29 NOTE — OP NOTES
DATE OF PROCEDURE:  5/29/2019     PREOPERATIVE DIAGNOSIS: Symptomatic right inguinal hernia. POSTOPERATIVE DIAGNOSIS: Reducible right indirect inguinal hernia. OPERATIVE PROCEDURE: Open repair of right inguinal hernia with mesh (CPT 22007)    SURGEON: Fidelina Valencia MD     ANESTHESIA: General endotracheal.     ESTIMATED BLOOD LOSS: Minimal.     IMPLANTS:    Implant Name Type Inv. Item Serial No.  Lot No. LRB No. Used Action   MESH POLYSTR SLF- 12X8 RT -- PROGRIP - SNA  MESH POLYSTR SLF- 12X8 RT -- PROGRIP NA [facility] [id number] Right 1 Implanted       SPECIMENS:    ID Type Source Tests Collected by Time Destination   1 : Hernia Sac and Cord Lipoma Preservative Hernia Sac  Lulu Cleveland MD 5/29/2019 1443 Pathology        INDICATIONS:  Symptomatic inguinal hernia. FINDINGS:  Indirect inguinal hernia    DESCRIPTION OF PROCEDURE:  After obtaining informed consent, the patient was taken to the operating room and placed supine on the operating table. An operative time out was performed, and general endotracheal anesthesia was induced. Preoperative antibiotics were administered and the abdomen was prepped and draped in the usual sterile fashion. An Thomas Fell was employed. An incision was made over the inguinal ligament with a blade and taken down to the subcutaneous tissues using electrocautery. The inferior superficial epigastric vessels were divided between 3-0 Vicryl ties. The external oblique was identified and cleared of its areolar attachments. The external oblique was incised along its fibers at the level of the external ring. This was then elevated with Metzenbaum scissors and divided through the external ring. The cord structures were surrounded at the pubic tubercle with a 1/4-inch Penrose drain. The cremasterics were then  using blunt dissection and the cord structures were inspected. The hernia sac was noted and grasped.  It was dissected proximally to the internal ring. The remaining cord structures were identified and isolated. The hernia sac was then opened at the distal end with scissors and under direct vision was high ligated with a 2-0 Vicryl suture. The redundant sac was excised, and the peritoneum retracted back into the abdominal cavity. The Progrip mesh was then soaked in bacitracin and saline and made ready for implantation. It was set in place with 2-cm medial overlap of the pubic tubercle. The flap was then reapproximated around the cord. The operative field was then copiously irrigated with bacitracin and saline, and the external oblique aponeurosis was closed with a running 3-0 Vicryl suture. Again, the field was irrigated with bacitracin and saline, and Mitchel's fascia was closed with a running 3-0 Vicryl. The deep dermal tissues were reapproximated with buried interrupted 3-0 Vicryl sutures, and the skin was then closed with a running subcuticular 4-0 Monocryl. The incision was dressed with Dermabond, and the patient was recovered from general anesthesia and taken to the recovery area in satisfactory condition. All instrument, sponge, and needle counts were reported as correct.

## 2019-06-03 ENCOUNTER — TELEPHONE (OUTPATIENT)
Dept: SURGERY | Age: 69
End: 2019-06-03

## 2019-06-03 NOTE — TELEPHONE ENCOUNTER
Patient states his incision is red. He does not have a fever, but wonders if he needs an antibiotic. The literature he received stated to call if he sees redness.

## 2019-06-03 NOTE — TELEPHONE ENCOUNTER
Spoke to patient using 2 identifiers regarding redness at incision site. Informed patient that I will consult with   Dr. Caitlin Jacome and get back with him. Patient voiced understanding.

## 2019-06-05 ENCOUNTER — TELEPHONE (OUTPATIENT)
Dept: SURGERY | Age: 69
End: 2019-06-05

## 2019-06-05 NOTE — TELEPHONE ENCOUNTER
Spoke to patient using 2 identifiers. Follow call to patient regarding redness around incision site. Patient stated \"the redness is much better. \" Patient was told to contact office should he need any other assistance.

## 2019-06-07 ENCOUNTER — TELEPHONE (OUTPATIENT)
Dept: SURGERY | Age: 69
End: 2019-06-07

## 2019-06-07 NOTE — TELEPHONE ENCOUNTER
Pt c/o redness and swelling in right groin. Has not changed since last conversation with Dr. Marbella Sandoval MA on 6/3/19Informed pt not unusual to have some redness or swelling post hernia surgery. requesting antibiotics. He denies any drainage, afebrile. S/p open RIHR with mesh on 5/29/19. He is agreeable with an appointment on 6/10 @ 11:40am. If symptoms worsen or no better go to er. Expressed understanding.

## 2019-06-07 NOTE — TELEPHONE ENCOUNTER
Patient calling regarding his incision site. It is  to the touch, sensitive, and swollen. Patient is requesting an antibiotic. Please advise.

## 2019-06-10 ENCOUNTER — OFFICE VISIT (OUTPATIENT)
Dept: SURGERY | Age: 69
End: 2019-06-10

## 2019-06-10 VITALS
SYSTOLIC BLOOD PRESSURE: 126 MMHG | HEIGHT: 72 IN | DIASTOLIC BLOOD PRESSURE: 77 MMHG | HEART RATE: 77 BPM | WEIGHT: 181 LBS | OXYGEN SATURATION: 95 % | BODY MASS INDEX: 24.52 KG/M2 | RESPIRATION RATE: 20 BRPM | TEMPERATURE: 97.5 F

## 2019-06-10 DIAGNOSIS — Z09 POSTOPERATIVE EXAMINATION: Primary | ICD-10-CM

## 2019-06-10 DIAGNOSIS — L53.9 ERYTHEMA: ICD-10-CM

## 2019-06-10 RX ORDER — CEPHALEXIN 500 MG/1
500 CAPSULE ORAL 4 TIMES DAILY
Qty: 40 CAP | Refills: 0 | Status: SHIPPED | OUTPATIENT
Start: 2019-06-10 | End: 2019-06-20

## 2019-06-10 NOTE — PROGRESS NOTES
Chief Complaint   Patient presents with    Surgical Follow-up     Open repair of right inguinal hernia with mesh 05/29/2019     1. Have you been to the ER, urgent care clinic since your last visit? Hospitalized since your last visit? No    2. Have you seen or consulted any other health care providers outside of the [address] [place] [address] since your last visit? Include any pap smears or colon screening.  No

## 2019-06-10 NOTE — PROGRESS NOTES
To: Micky Ruff MD    From: Fidelina Valencia MD    Thank you for referring Idalia Terry. Encounter Date: 6/10/2019    Subjective:      Jane Carter is a 68 y.o. male presents for postop care. Has no complaints today. Eating a regular diet without difficulty. Bowel movements are regular. Objective:     General:  alert, cooperative, no distress, appears stated age   Abdomen: soft, bowel sounds active, non-tender   Incision(s):  healing well, no drainage, mild erythema, repair intact with vigorous valsalva. Assessment:     S/p open right inguinal hernia repair. Mild erythema. Doing well postoperatively. Plan:     Keflex. RTC 1 weeks.     Fidelina Valencia MD

## 2019-06-10 NOTE — Clinical Note
6/10/19 Patient: Jyoti Lwason YOB: 1950 Date of Visit: 6/10/2019 Oscar Castro MD 
[facility] [address] [id number] [facility] [address] [address] VIA In Basket Dear Oscar Castro MD, Thank you for referring Mr. Santo Medeiros to [address] for evaluation. My notes for this consultation are attached. If you have questions, please do not hesitate to call me. I look forward to following your patient along with you.  
 
 
Sincerely, 
 
Sosa Hutson MD

## 2019-06-17 ENCOUNTER — OFFICE VISIT (OUTPATIENT)
Dept: SURGERY | Age: 69
End: 2019-06-17

## 2019-06-17 VITALS
RESPIRATION RATE: 16 BRPM | SYSTOLIC BLOOD PRESSURE: 135 MMHG | HEIGHT: 72 IN | WEIGHT: 183.7 LBS | BODY MASS INDEX: 24.88 KG/M2 | TEMPERATURE: 98.2 F | HEART RATE: 55 BPM | OXYGEN SATURATION: 96 % | DIASTOLIC BLOOD PRESSURE: 78 MMHG

## 2019-06-17 DIAGNOSIS — Z09 POSTOPERATIVE EXAMINATION: Primary | ICD-10-CM

## 2019-06-17 NOTE — PROGRESS NOTES
To: Veena Short MD    From: Ashley Winslow MD    Thank you for referring Orly Arnett. Encounter Date: 6/17/2019    Subjective:      Ciera Diaz is a 68 y.o. male presents for postop care. Has no complaints today. Eating a regular diet without difficulty. Bowel movements are regular. Objective:     General:  alert, cooperative, no distress, appears stated age   Abdomen: soft, bowel sounds active, non-tender   Incision(s):  healing well, no drainage, minimal erythema, repair intact with vigorous valsalva. Assessment:     S/p open right inguinal hernia repair. Mild erythema improved on abx. Doing well postoperatively. Plan:     Finish Keflex. RTC 2 weeks.     Ashley Winslow MD

## 2019-06-17 NOTE — PROGRESS NOTES
Chief Complaint   Patient presents with    Surgical Follow-up     [facility] w/mesh on 5/29/19. 1. Have you been to the ER, urgent care clinic since your last visit? Hospitalized since your last visit? No    2. Have you seen or consulted any other health care providers outside of the [address] [place] [address] since your last visit? Include any pap smears or colon screening.  No

## 2019-06-24 ENCOUNTER — OFFICE VISIT (OUTPATIENT)
Dept: INTERNAL MEDICINE CLINIC | Age: 69
End: 2019-06-24

## 2019-06-24 VITALS
SYSTOLIC BLOOD PRESSURE: 116 MMHG | BODY MASS INDEX: 24.84 KG/M2 | WEIGHT: 183.4 LBS | DIASTOLIC BLOOD PRESSURE: 70 MMHG | HEART RATE: 95 BPM | TEMPERATURE: 98 F | HEIGHT: 72 IN | OXYGEN SATURATION: 97 %

## 2019-06-24 DIAGNOSIS — S23.9XXA THORACIC BACK SPRAIN, INITIAL ENCOUNTER: Primary | ICD-10-CM

## 2019-06-24 RX ORDER — LATANOPROST 50 UG/ML
SOLUTION/ DROPS OPHTHALMIC
COMMUNITY
Start: 2019-06-18

## 2019-06-24 RX ORDER — IBUPROFEN 800 MG/1
800 TABLET ORAL
Qty: 90 TAB | Refills: 0 | Status: SHIPPED | OUTPATIENT
Start: 2019-06-24 | End: 2022-09-23

## 2019-06-24 RX ORDER — CYCLOBENZAPRINE HCL 5 MG
5 TABLET ORAL
Qty: 30 TAB | Refills: 0 | Status: SHIPPED | OUTPATIENT
Start: 2019-06-24 | End: 2019-07-04

## 2019-06-24 NOTE — PROGRESS NOTES
This note will not be viewable in [address] Mr. Kehinde Howell presents to the office today with complaints of right-sided mid back pain. History is remarkable in that he had right-sided inguinal hernia surgery 4 weeks ago which she has been slowly recovering from. Prior to the onset of his back pain he washed his pickup truck but tried to take it very easy upon doing it. Almost immediately after he began to note pain in his right mid paraspinal muscles. He notes that the pain is severe at times and he notes certain activities where it feels like his muscles are grabbing in that area. The patient does have ibuprofen which she has been taking sparingly. He notes of no radicular discomfort and there is been no rash. Past Medical History:   Diagnosis Date    BPH with obstruction/lower urinary tract symptoms 3/23/2018    Chronic insomnia 3/23/2018    Coronary artery disease involving native coronary artery 3/23/2018    Status post stents ×3    Hypercholesterolemia 3/23/2018    Immunosuppression due to drug therapy 4/10/2019    Primary open angle glaucoma of right eye 3/23/2018    PSA elevation 3/23/2018    Status post biopsy. Atypical small acinar proliferation    Psoriatic arthritis ([facility] [address]      Past Surgical History:   Procedure Laterality Date    COLONOSCOPY N/A 12/13/2018    COLONOSCOPY performed by Dante Salazar [facility] at [facility] ENDOSCOPY    HX HEART CATHETERIZATION  05/2015    HX HERNIA REPAIR  05/29/2019    Open repair of right inguinal hernia with mesh     HX KNEE ARTHROSCOPY Bilateral     HX ORTHOPAEDIC Right     Cubital tunnel sx    IR INJ FORAMIN EPID LUMB ANES/STER ADDL  4/23/2019    IR INJ FORAMIN EPID LUMB ANES/STER SNGL  4/23/2019     No Known Allergies  Current Outpatient Medications   Medication Sig Dispense Refill    latanoprost (XALATAN) 0.005 % ophthalmic solution       cyclobenzaprine (FLEXERIL) 5 mg tablet Take 1 Tab by mouth three (3) times daily (with meals) for 10 days.  27 Tab 0    ibuprofen (MOTRIN) 800 mg tablet Take 1 Tab by mouth three (3) times daily (with meals). 90 Tab 0    cholecalciferol, VITAMIN D3, (VITAMIN D3) 5,000 unit tab tablet Take 5,000 Units by mouth daily.  ascorbic acid, vitamin C, (VITAMIN C) 500 mg tablet Take 1,000 mg by mouth daily.  docusate sodium (COLACE) 100 mg capsule Take 1 Cap by mouth two (2) times a day. (Patient taking differently: Take 100 mg by mouth two (2) times daily as needed.) 30 Cap 0    zolpidem (AMBIEN) 5 mg tablet TAKE 1 TABLET BY MOUTH NIGHTLY *DO  NOT  EXCEED  DAILY  AMOUNT  5MG* 90 Tab 1    secukinumab (COSENTYX PEN) 150 mg/mL pnij by SubCUTAneous route every thirty (30) days.  atorvastatin (LIPITOR) 20 mg tablet Take 1 Tab by mouth nightly. 30 Tab 6    aspirin delayed-release 81 mg tablet Take  by mouth daily.  doxazosin (CARDURA) 4 mg tablet Take 4 mg by mouth nightly. Indications: BENIGN PROSTATIC HYPERTROPHY      oxybutynin chloride XL 10 mg CR tablet Take 10 mg by mouth nightly.  finasteride (PROSCAR) 5 mg tablet Take 5 mg by mouth nightly.  folic acid (FOLVITE) 1 mg tablet Take 1 mg by mouth daily.  methotrexate (RHEUMATREX) 2.5 mg tablet Take 15 mg by mouth every .  oxyCODONE-acetaminophen (PERCOCET 7.5) 7.5-325 mg per tablet Take 1 Tab by mouth every six (6) hours as needed.  naloxone (NARCAN) 4 mg/actuation nasal spray Use 1 spray intranasally, then discard. Repeat with new spray every 2 min as needed for opioid overdose symptoms, alternating nostrils.  (Patient not taking: Reported on 2019) 2 Each 0     Social History     Socioeconomic History    Marital status:      Spouse name: Not on file    Number of children: Not on file    Years of education: Not on file    Highest education level: Not on file   Tobacco Use    Smoking status: Former Smoker     Years: 2.00     Last attempt to quit: 1972     Years since quittin.5    Smokeless tobacco: Never Used   Substance and Sexual Activity    Alcohol use: No    Drug use: No     Family History   Problem Relation Age of Onset    Bleeding Prob Mother     No Known Problems Father     Cancer Sister         rectal    Heart Disease Brother     Hypertension Brother        Review of Systems:  Gen: no fatigue, fever, chills,  Nose: no rhinorrhea, no sinus pain  Mouth: no oral lesions, no sore throat  Resp: no shortness of breath, no wheezing, no cough  CV: no chest pain, no orthopnea, no paroxysmal nocturnal dyspnea, no lower extremity edema, no palpitations  Abd: no nausea, no heartburn, no diarrhea, no constipation, no abdominal pain  Back: Positive for back pain without radiculopathy. Stiffness with ROM  Neuro: no headaches, no syncope or presyncopal episodes  Heme: no lymphadenopathy, no easy bruising or bleeding  ROS otherwise negative    Visit Vitals  /70 (BP 1 Location: Right arm, BP Patient Position: Sitting)   Pulse 95   Temp 98 °F (36.7 °C) (Oral)   Ht 6' (1.829 m)   Wt 183 lb 6.4 oz (83.2 kg)   SpO2 97%   BMI 24.87 kg/m²     Gen: alert, oriented, no acute distress  HEENT: Unremarkable  Neck: Supple without adenopathy  Resp: no increase work of breathing, lungs clear to ausculation bilaterally, no wheezing, rales or rhonchi  CV: RRR. No murmurs, rubs, or gallops. Abd: soft, not tender, not distended. No hepatosplenomegaly. Normal bowel sounds. Neuro: cranial nerves intact, normal strength and movement in all extremities, reflexes and sensation intact and symmetric. Skin: no lesion or rash  Extremities: no cyanosis or edema  Back: Mid   to palpation on right side with palpable muscle spasm identified. Twisting ROM limited. There is no lumbar pain to palpation in his gait is normal  Diagnoses and all orders for this visit:    Thoracic back sprain, initial encounter  -     cyclobenzaprine (FLEXERIL) 5 mg tablet; Take 1 Tab by mouth three (3) times daily (with meals) for 10 days. , Normal, Disp-30 Tab, R-0  -     ibuprofen (MOTRIN) 800 mg tablet; Take 1 Tab by mouth three (3) times daily (with meals). , Normal, Disp-90 Tab, R-0        Other instructions:   Patient's medications were reviewed and reconciled. Will start on low-dose Flexeril and ibuprofen 800 mg on a 3 times daily basis. Moist heat will be applied in a walking program has been encouraged    If he is not showing improvement over the next week will refer to physical therapy    Follow-up and Dispositions    · Return if symptoms worsen or fail to improve.          Harinder Tavarez MD

## 2019-06-24 NOTE — PATIENT INSTRUCTIONS
Muscle Strain: Care Instructions  Your Care Instructions    A muscle strain happens when you overstretch, or pull, a muscle. It can happen when you exercise or lift something or when you have an accident. Rest and other home care can help the muscle heal.  Follow-up care is a key part of your treatment and safety. Be sure to make and go to all appointments, and call your doctor if you are having problems. It's also a good idea to know your test results and keep a list of the medicines you take. How can you care for yourself at home? · Rest the strained muscle. Do not put weight on it for a day or two. If your doctor advises you to, use crutches or a sling to rest a sore limb. · Put ice or a cold pack on the sore muscle for 10 to 20 minutes at a time to stop swelling. Put a thin cloth between the ice pack and your skin. · Prop up the sore arm or leg on a pillow when you ice it or anytime you sit or lie down during the next 3 days. Try to keep it above the level of your heart. This will help reduce swelling. · Take pain medicines exactly as directed. ? If the doctor gave you a prescription medicine for pain, take it as prescribed. ? If you are not taking a prescription pain medicine, ask your doctor if you can take an over-the-counter medicine. · Do not do anything that makes the pain worse. Return to exercise gradually as you feel better. When should you call for help? Call your doctor now or seek immediate medical care if:    · You have new severe pain.     · Your injured limb is cool or pale or changes color.     · You have tingling, weakness, or numbness in your injured limb.     · You cannot move the injured area.    Watch closely for changes in your health, and be sure to contact your doctor if:    · You cannot put weight on a joint, or it feels unsteady when you walk.     · Pain and swelling get worse or do not start to get better after 2 days of home treatment. Where can you learn more?   Go to http://anin-delfin.info/. Enter M210 in the search box to learn more about \"Muscle Strain: Care Instructions. \"  Current as of: September 20, 2018  Content Version: 11.9  © 8623-3895 [employer]. Care instructions adapted under license by [employer] (which disclaims liability or warranty for this information). If you have questions about a medical condition or this instruction, always ask your healthcare professional. [place] [address] any warranty or liability for your use of this information.

## 2019-06-24 NOTE — PROGRESS NOTES
Deon Schmitz is a 68 y.o. male presenting for Back Pain  . 1. Have you been to the ER, urgent care clinic since your last visit? Hospitalized since your last visit? Yes When: 5-29-19 Where: [facility] Reason for visit: Right inguinal hernia    2. Have you seen or consulted any other health care providers outside of the [address] [place] [address] since your last visit? Include any pap smears or colon screening. No    Fall Risk Assessment, last 12 mths 6/17/2019   Able to walk? Yes   Fall in past 12 months? No         Abuse Screening Questionnaire 6/17/2019   Do you ever feel afraid of your partner? N   Are you in a relationship with someone who physically or mentally threatens you? N   Is it safe for you to go home? Y       3 most recent PHQ Screens 6/17/2019   Little interest or pleasure in doing things Not at all   Feeling down, depressed, irritable, or hopeless Not at all   Total Score PHQ 2 0       There are no discontinued medications.

## 2019-07-01 ENCOUNTER — TELEPHONE (OUTPATIENT)
Dept: INTERNAL MEDICINE CLINIC | Age: 69
End: 2019-07-01

## 2019-07-01 NOTE — TELEPHONE ENCOUNTER
Patient is calling, he advises that it was recommended last week at his appointment to start therapy. He would like to know what is meant by this, physical therapy or massage therapy.     Best call back # for patient: [phone]

## 2019-07-01 NOTE — TELEPHONE ENCOUNTER
Spoke with patient and he states he has been doing moist heat and a walking program as discussed. He advised me he was feeling a little better and would see how he does over the next week. I advised him if no better we would put an order in for him to try physical therapy.

## 2019-07-08 ENCOUNTER — OFFICE VISIT (OUTPATIENT)
Dept: SURGERY | Age: 69
End: 2019-07-08

## 2019-07-08 ENCOUNTER — TELEPHONE (OUTPATIENT)
Dept: INTERNAL MEDICINE CLINIC | Age: 69
End: 2019-07-08

## 2019-07-08 VITALS
HEART RATE: 71 BPM | DIASTOLIC BLOOD PRESSURE: 75 MMHG | HEIGHT: 72 IN | SYSTOLIC BLOOD PRESSURE: 115 MMHG | RESPIRATION RATE: 16 BRPM | OXYGEN SATURATION: 96 % | WEIGHT: 184 LBS | BODY MASS INDEX: 24.92 KG/M2 | TEMPERATURE: 97.8 F

## 2019-07-08 DIAGNOSIS — Z09 POSTOPERATIVE EXAMINATION: Primary | ICD-10-CM

## 2019-07-08 DIAGNOSIS — S23.9XXA THORACIC BACK SPRAIN, INITIAL ENCOUNTER: Primary | ICD-10-CM

## 2019-07-08 NOTE — TELEPHONE ENCOUNTER
Patient called an stated that the doctor said to call if his back is not getting better and that he might get physical therapy. So that is why he is calling because his back is not better.

## 2019-07-08 NOTE — PROGRESS NOTES
To: Simin Harris MD    From: Vandana Johnson MD    Thank you for referring Evelio Santoyo. Encounter Date: 7/8/2019    Subjective:      Mp Hartley is a 68 y.o. male presents for postop care. Has no complaints today. Feels great. Eating a regular diet without difficulty. Bowel movements are regular. Objective:     General:  alert, cooperative, no distress, appears stated age   Abdomen: soft, bowel sounds active, non-tender   Incision:  Well healed. Erythema resolved. Repair intact. Assessment:     S/p open right inguinal hernia repair. Doing well postoperatively. Plan:     Reminded of activity restrictions. Told to call for concerns.       Vandana Johnson MD

## 2019-07-08 NOTE — Clinical Note
7/8/19 Patient: Kiet Roach YOB: 1950 Date of Visit: 7/8/2019 Benoit Hunt MD 
[facility] [address] [address] [facility] [address] [id number] VIA In Basket Dear Benoit Hunt MD, Thank you for referring Mr. Nia Singleton to [address] for evaluation. My notes for this consultation are attached. If you have questions, please do not hesitate to call me. I look forward to following your patient along with you.  
 
 
Sincerely, 
 
Taras Guzman MD

## 2019-07-08 NOTE — PROGRESS NOTES
Identified pt with two pt identifiers(name and ). Reviewed record in preparation for visit and have obtained necessary documentation. Chief Complaint   Patient presents with    Inguinal Hernia     Post Op       Visit Vitals  /75 (BP 1 Location: Right arm, BP Patient Position: Sitting)   Pulse 71   Temp 97.8 °F (36.6 °C) (Oral)   Resp 16   Ht 6' (1.829 m)   Wt 184 lb (83.5 kg)   SpO2 96%   BMI 24.95 kg/m²     Health Maintenance Due   Topic    Hepatitis C Screening     DTaP/Tdap/Td series (1 - Tdap)    Shingrix Vaccine Age 49> (1 of 2)    Pneumococcal 65+ years (1 of 2 - PCV13)    MEDICARE YEARLY EXAM        Coordination of Care Questionnaire:  :   1) Have you been to an emergency room, urgent care, or hospitalized since your last visit? If yes, where when, and reason for visit? No         2. Have seen or consulted any other health care provider since your last visit? If yes, where when, and reason for visit? No           Patient is accompanied by self I have received verbal consent from Kiet Roach to discuss any/all medical information while they are present in the room.

## 2019-07-17 ENCOUNTER — HOSPITAL ENCOUNTER (EMERGENCY)
Age: 69
Discharge: HOME | End: 2019-07-17
Attending: EMERGENCY MEDICINE
Payer: MEDICARE

## 2019-07-17 ENCOUNTER — APPOINTMENT (OUTPATIENT)
Dept: GENERAL RADIOLOGY | Age: 69
End: 2019-07-17
Attending: EMERGENCY MEDICINE
Payer: MEDICARE

## 2019-07-17 ENCOUNTER — APPOINTMENT (OUTPATIENT)
Dept: CT IMAGING | Age: 69
End: 2019-07-17
Attending: EMERGENCY MEDICINE
Payer: MEDICARE

## 2019-07-17 VITALS
HEART RATE: 94 BPM | DIASTOLIC BLOOD PRESSURE: 82 MMHG | TEMPERATURE: 98.1 F | BODY MASS INDEX: 24.66 KG/M2 | OXYGEN SATURATION: 99 % | RESPIRATION RATE: 17 BRPM | HEIGHT: 72 IN | WEIGHT: 182.1 LBS | SYSTOLIC BLOOD PRESSURE: 150 MMHG

## 2019-07-17 DIAGNOSIS — M54.6 MIDLINE THORACIC BACK PAIN, UNSPECIFIED CHRONICITY: Primary | ICD-10-CM

## 2019-07-17 LAB
ALBUMIN SERPL-MCNC: 4 G/DL (ref 3.5–5)
ALBUMIN/GLOB SERPL: 1.3 {RATIO} (ref 1.1–2.2)
ALP SERPL-CCNC: 113 U/L (ref 45–117)
ALT SERPL-CCNC: 30 U/L (ref 12–78)
ANION GAP SERPL CALC-SCNC: 4 MMOL/L (ref 5–15)
AST SERPL-CCNC: 15 U/L (ref 15–37)
ATRIAL RATE: 68 BPM
BASOPHILS # BLD: 0 K/UL (ref 0–0.1)
BASOPHILS NFR BLD: 1 % (ref 0–1)
BILIRUB SERPL-MCNC: 1 MG/DL (ref 0.2–1)
BUN SERPL-MCNC: 16 MG/DL (ref 6–20)
BUN/CREAT SERPL: 15 (ref 12–20)
CALCIUM SERPL-MCNC: 8.9 MG/DL (ref 8.5–10.1)
CALCULATED P AXIS, ECG09: 33 DEGREES
CALCULATED R AXIS, ECG10: 44 DEGREES
CALCULATED T AXIS, ECG11: 50 DEGREES
CHLORIDE SERPL-SCNC: 106 MMOL/L (ref 97–108)
CK SERPL-CCNC: 59 U/L (ref 39–308)
CO2 SERPL-SCNC: 29 MMOL/L (ref 21–32)
COMMENT, HOLDF: NORMAL
CREAT SERPL-MCNC: 1.09 MG/DL (ref 0.7–1.3)
DIAGNOSIS, 93000: NORMAL
DIFFERENTIAL METHOD BLD: NORMAL
EOSINOPHIL # BLD: 0.1 K/UL (ref 0–0.4)
EOSINOPHIL NFR BLD: 1 % (ref 0–7)
ERYTHROCYTE [DISTWIDTH] IN BLOOD BY AUTOMATED COUNT: 12.7 % (ref 11.5–14.5)
GLOBULIN SER CALC-MCNC: 3 G/DL (ref 2–4)
GLUCOSE SERPL-MCNC: 149 MG/DL (ref 65–100)
HCT VFR BLD AUTO: 37.8 % (ref 36.6–50.3)
HGB BLD-MCNC: 13.2 G/DL (ref 12.1–17)
IMM GRANULOCYTES # BLD AUTO: 0 K/UL (ref 0–0.04)
IMM GRANULOCYTES NFR BLD AUTO: 0 % (ref 0–0.5)
LYMPHOCYTES # BLD: 1.5 K/UL (ref 0.8–3.5)
LYMPHOCYTES NFR BLD: 32 % (ref 12–49)
MCH RBC QN AUTO: 29.6 PG (ref 26–34)
MCHC RBC AUTO-ENTMCNC: 34.9 G/DL (ref 30–36.5)
MCV RBC AUTO: 84.8 FL (ref 80–99)
MONOCYTES # BLD: 0.4 K/UL (ref 0–1)
MONOCYTES NFR BLD: 10 % (ref 5–13)
NEUTS SEG # BLD: 2.5 K/UL (ref 1.8–8)
NEUTS SEG NFR BLD: 56 % (ref 32–75)
NRBC # BLD: 0 K/UL (ref 0–0.01)
NRBC BLD-RTO: 0 PER 100 WBC
P-R INTERVAL, ECG05: 176 MS
PLATELET # BLD AUTO: 163 K/UL (ref 150–400)
PMV BLD AUTO: 11 FL (ref 8.9–12.9)
POTASSIUM SERPL-SCNC: 4.2 MMOL/L (ref 3.5–5.1)
PROT SERPL-MCNC: 7 G/DL (ref 6.4–8.2)
Q-T INTERVAL, ECG07: 376 MS
QRS DURATION, ECG06: 78 MS
QTC CALCULATION (BEZET), ECG08: 399 MS
RBC # BLD AUTO: 4.46 M/UL (ref 4.1–5.7)
SAMPLES BEING HELD,HOLD: NORMAL
SODIUM SERPL-SCNC: 139 MMOL/L (ref 136–145)
TROPONIN I SERPL-MCNC: <0.05 NG/ML
VENTRICULAR RATE, ECG03: 68 BPM
WBC # BLD AUTO: 4.6 K/UL (ref 4.1–11.1)

## 2019-07-17 PROCEDURE — 96374 THER/PROPH/DIAG INJ IV PUSH: CPT

## 2019-07-17 PROCEDURE — 71046 X-RAY EXAM CHEST 2 VIEWS: CPT

## 2019-07-17 PROCEDURE — 72072 X-RAY EXAM THORAC SPINE 3VWS: CPT

## 2019-07-17 PROCEDURE — 36415 COLL VENOUS BLD VENIPUNCTURE: CPT

## 2019-07-17 PROCEDURE — 99284 EMERGENCY DEPT VISIT MOD MDM: CPT

## 2019-07-17 PROCEDURE — 74011250636 HC RX REV CODE- 250/636: Performed by: EMERGENCY MEDICINE

## 2019-07-17 PROCEDURE — 74011636320 HC RX REV CODE- 636/320: Performed by: EMERGENCY MEDICINE

## 2019-07-17 PROCEDURE — 82550 ASSAY OF CK (CPK): CPT

## 2019-07-17 PROCEDURE — 84484 ASSAY OF TROPONIN QUANT: CPT

## 2019-07-17 PROCEDURE — 93005 ELECTROCARDIOGRAM TRACING: CPT

## 2019-07-17 PROCEDURE — 85025 COMPLETE CBC W/AUTO DIFF WBC: CPT

## 2019-07-17 PROCEDURE — 71275 CT ANGIOGRAPHY CHEST: CPT

## 2019-07-17 PROCEDURE — 80053 COMPREHEN METABOLIC PANEL: CPT

## 2019-07-17 RX ORDER — SODIUM CHLORIDE 0.9 % (FLUSH) 0.9 %
10 SYRINGE (ML) INJECTION
Status: COMPLETED | OUTPATIENT
Start: 2019-07-17 | End: 2019-07-17

## 2019-07-17 RX ORDER — MORPHINE SULFATE 2 MG/ML
4 INJECTION, SOLUTION INTRAMUSCULAR; INTRAVENOUS
Status: COMPLETED | OUTPATIENT
Start: 2019-07-17 | End: 2019-07-17

## 2019-07-17 RX ADMIN — IOPAMIDOL 80 ML: 755 INJECTION, SOLUTION INTRAVENOUS at 16:23

## 2019-07-17 RX ADMIN — MORPHINE SULFATE 4 MG: 2 INJECTION, SOLUTION INTRAMUSCULAR; INTRAVENOUS at 16:10

## 2019-07-17 RX ADMIN — Medication 10 ML: at 16:24

## 2019-07-17 NOTE — ED NOTES
Dr. Dana Cruz reviewed discharge instructions with the patient. The patient verbalized understanding. All questions and concerns were addressed. The patient declined a wheelchair and is discharged ambulatory in the care of family members with instructions and prescriptions in hand. Pt is alert and oriented x 4. Respirations are clear and unlabored.

## 2019-07-17 NOTE — ED PROVIDER NOTES
EMERGENCY DEPARTMENT HISTORY AND PHYSICAL EXAM      Date: 7/17/2019  Patient Name: Farhan Kenny    History of Presenting Illness     Chief Complaint   Patient presents with    Back Pain     Ambulatory into the ED with c/o non-traumatic mid back pain x several weeks-treated by his PCP and is supposed start physical therapy on 7/19. Moderate tenderness to thoracic spine. No obvious distress noted.  Shortness of Breath     SOB with exertion x 2 days. [facility] cardiac stent placement in 2015. History Provided By: Patient    HPI: Farhan Kenny, 68 y.o. male with PMHx as noted below presents the emergency department with chief complaint of back pain. Patient notes that he has had mid upper back pain now for the last 2 weeks. Patient states he went to his primary care physician who started him on a course of muscle relaxers and ibuprofen without improvement in his symptoms. Patient notes he is continued to have pain in his upper back that is worse with deep inspiration and seems to be without any relieving factors. Patient was sent here for further evaluation. Otherwise having no chest pain, fevers, chills, neurologic deficits, bowel or bladder dysfunction. PCP: Sheila Deluna MD    Current Outpatient Medications   Medication Sig Dispense Refill    latanoprost (XALATAN) 0.005 % ophthalmic solution       ibuprofen (MOTRIN) 800 mg tablet Take 1 Tab by mouth three (3) times daily (with meals). 90 Tab 0    naloxone (NARCAN) 4 mg/actuation nasal spray Use 1 spray intranasally, then discard. Repeat with new spray every 2 min as needed for opioid overdose symptoms, alternating nostrils. (Patient not taking: Reported on 6/17/2019) 2 Each 0    cholecalciferol, VITAMIN D3, (VITAMIN D3) 5,000 unit tab tablet Take 5,000 Units by mouth daily.  ascorbic acid, vitamin C, (VITAMIN C) 500 mg tablet Take 1,000 mg by mouth daily.       docusate sodium (COLACE) 100 mg capsule Take 1 Cap by mouth two (2) times a day. (Patient taking differently: Take 100 mg by mouth two (2) times daily as needed.) 30 Cap 0    zolpidem (AMBIEN) 5 mg tablet TAKE 1 TABLET BY MOUTH NIGHTLY *DO  NOT  EXCEED  DAILY  AMOUNT  5MG* 90 Tab 1    secukinumab (COSENTYX PEN) 150 mg/mL pnij by SubCUTAneous route every thirty (30) days.  atorvastatin (LIPITOR) 20 mg tablet Take 1 Tab by mouth nightly. 30 Tab 6    aspirin delayed-release 81 mg tablet Take  by mouth daily.  doxazosin (CARDURA) 4 mg tablet Take 4 mg by mouth nightly. Indications: BENIGN PROSTATIC HYPERTROPHY      oxybutynin chloride XL 10 mg CR tablet Take 10 mg by mouth nightly.  finasteride (PROSCAR) 5 mg tablet Take 5 mg by mouth nightly.  folic acid (FOLVITE) 1 mg tablet Take 1 mg by mouth daily.  methotrexate (RHEUMATREX) 2.5 mg tablet Take 15 mg by mouth every Sunday.  oxyCODONE-acetaminophen (PERCOCET 7.5) 7.5-325 mg per tablet Take 1 Tab by mouth every six (6) hours as needed. Past History     Past Medical History:  Past Medical History:   Diagnosis Date    BPH with obstruction/lower urinary tract symptoms 3/23/2018    Chronic insomnia 3/23/2018    Coronary artery disease involving native coronary artery 3/23/2018    Status post stents ×3    Hypercholesterolemia 3/23/2018    Immunosuppression due to drug therapy 4/10/2019    Primary open angle glaucoma of right eye 3/23/2018    PSA elevation 3/23/2018    Status post biopsy.   Atypical small acinar proliferation    Psoriatic arthritis [facility])        Past Surgical History:  Past Surgical History:   Procedure Laterality Date    COLONOSCOPY N/A 12/13/2018    COLONOSCOPY performed by Simone Cortés [facility] at [facility] ENDOSCOPY    HX HEART CATHETERIZATION  05/2015    HX HERNIA REPAIR  05/29/2019    Open repair of right inguinal hernia with mesh     HX KNEE ARTHROSCOPY Bilateral     HX ORTHOPAEDIC Right     Cubital tunnel sx    IR INJ FORAMIN EPID LUMB ANES/STER ADDL  4/23/2019    IR INJ FORAMIN EPID LUMB ANES/STER SNGL  2019       Family History:  Family History   Problem Relation Age of Onset    Bleeding Prob Mother     No Known Problems Father     Cancer Sister         rectal    Heart Disease Brother     Hypertension Brother        Social History:  Social History     Tobacco Use    Smoking status: Former Smoker     Years: 2.00     Last attempt to quit: 1972     Years since quittin.5    Smokeless tobacco: Never Used   Substance Use Topics    Alcohol use: No    Drug use: No       Allergies:  No Known Allergies      Review of Systems   Review of Systems  Constitutional: Negative for fever, chills, and fatigue. HENT: Negative for congestion, sore throat, rhinorrhea, sneezing and neck stiffness   Eyes: Negative for discharge and redness. Respiratory: Negative for  shortness of breath, wheezing   Cardiovascular: Negative for chest pain, palpitations   Gastrointestinal: Negative for nausea, vomiting, abdominal pain, constipation, diarrhea and blood in stool. Genitourinary: Negative for dysuria, hematuria, flank pain, decreased urine volume, discharge,   Musculoskeletal: Negative for myalgias or joint pain . Positive back pain  Skin: Negative for rash or lesions . Neurological: Negative weakness, light-headedness, numbness and headaches. Physical Exam   Physical Exam    GENERAL: alert and oriented, no acute distress  EYES: PEERL, No injection, discharge or icterus. ENT: Mucous membranes pink and moist.  NECK: Supple  LUNGS: Airway patent. Non-labored respirations. Breath sounds clear with good air entry bilaterally. HEART: Regular rate and rhythm. No peripheral edema  ABDOMEN: Non-distended and non-tender, without guarding or rebound. SKIN:  warm, dry  MSK/EXTREMITIES: Without swelling, tenderness or deformity, symmetric with normal ROM. There is midline thoracic spinal tenderness over the mid thoracic spine  NEUROLOGICAL: Alert, oriented.   Strength 5/5 bilateral lower extremities, sensation intact and equal throughout to light touch. Diagnostic Study Results     Labs -     Recent Results (from the past 12 hour(s))   EKG, 12 LEAD, INITIAL    Collection Time: 07/17/19  1:15 PM   Result Value Ref Range    Ventricular Rate 68 BPM    Atrial Rate 68 BPM    P-R Interval 176 ms    QRS Duration 78 ms    Q-T Interval 376 ms    QTC Calculation (Bezet) 399 ms    Calculated P Axis 33 degrees    Calculated R Axis 44 degrees    Calculated T Axis 50 degrees    Diagnosis       Normal sinus rhythm  Normal ECG  When compared with ECG of 02-NOV-2017 10:02,  No significant change was found  Confirmed by Anna Eastman ([id number]) on 7/17/2019 3:28:36 PM     CBC WITH AUTOMATED DIFF    Collection Time: 07/17/19  1:22 PM   Result Value Ref Range    WBC 4.6 4.1 - 11.1 K/uL    RBC 4.46 4.10 - 5.70 M/uL    HGB 13.2 12.1 - 17.0 g/dL    HCT 37.8 36.6 - 50.3 %    MCV 84.8 80.0 - 99.0 FL    MCH 29.6 26.0 - 34.0 PG    MCHC 34.9 30.0 - 36.5 g/dL    RDW 12.7 11.5 - 14.5 %    PLATELET 895 511 - 249 K/uL    MPV 11.0 8.9 - 12.9 FL    NRBC 0.0 0  WBC    ABSOLUTE NRBC 0.00 0.00 - 0.01 K/uL    NEUTROPHILS 56 32 - 75 %    LYMPHOCYTES 32 12 - 49 %    MONOCYTES 10 5 - 13 %    EOSINOPHILS 1 0 - 7 %    BASOPHILS 1 0 - 1 %    IMMATURE GRANULOCYTES 0 0.0 - 0.5 %    ABS. NEUTROPHILS 2.5 1.8 - 8.0 K/UL    ABS. LYMPHOCYTES 1.5 0.8 - 3.5 K/UL    ABS. MONOCYTES 0.4 0.0 - 1.0 K/UL    ABS. EOSINOPHILS 0.1 0.0 - 0.4 K/UL    ABS. BASOPHILS 0.0 0.0 - 0.1 K/UL    ABS. IMM.  GRANS. 0.0 0.00 - 0.04 K/UL    DF AUTOMATED     METABOLIC PANEL, COMPREHENSIVE    Collection Time: 07/17/19  1:22 PM   Result Value Ref Range    Sodium 139 136 - 145 mmol/L    Potassium 4.2 3.5 - 5.1 mmol/L    Chloride 106 97 - 108 mmol/L    CO2 29 21 - 32 mmol/L    Anion gap 4 (L) 5 - 15 mmol/L    Glucose 149 (H) 65 - 100 mg/dL    BUN 16 6 - 20 MG/DL    Creatinine 1.09 0.70 - 1.30 MG/DL    BUN/Creatinine ratio 15 12 - 20      GFR est AA >60 >60 ml/min/1.73m2    GFR est non-AA >60 >60 ml/min/1.73m2    Calcium 8.9 8.5 - 10.1 MG/DL    Bilirubin, total 1.0 0.2 - 1.0 MG/DL    ALT (SGPT) 30 12 - 78 U/L    AST (SGOT) 15 15 - 37 U/L    Alk. phosphatase 113 45 - 117 U/L    Protein, total 7.0 6.4 - 8.2 g/dL    Albumin 4.0 3.5 - 5.0 g/dL    Globulin 3.0 2.0 - 4.0 g/dL    A-G Ratio 1.3 1.1 - 2.2     TROPONIN I    Collection Time: 07/17/19  1:22 PM   Result Value Ref Range    Troponin-I, Qt. <0.05 <0.05 ng/mL   CK W/ REFLX CKMB    Collection Time: 07/17/19  1:22 PM   Result Value Ref Range    CK 59 39 - 308 U/L   SAMPLES BEING HELD    Collection Time: 07/17/19  1:22 PM   Result Value Ref Range    SAMPLES BEING HELD 1BLU     COMMENT        Add-on orders for these samples will be processed based on acceptable specimen integrity and analyte stability, which may vary by analyte. Radiologic Studies -   CTA CHEST W OR W WO CONT   Final Result   IMPRESSION:   1. No acute pulmonary embolism or other acute abnormality in the chest.      2. Stable low-density liver lesions previously seen to represent hemangiomas. 3. A partially visualized mixed sclerotic and lytic lesion in the L1 vertebral   body is unchanged dating back to 2009 and therefore benign. No other bony lesion   or acute fracture. XR SPINE THORAC 3 V   Final Result   IMPRESSION: No acute abnormality. Stable mild diffuse degenerative changes. XR CHEST PA LAT   Final Result   IMPRESSION:      No acute process. Stable exam.           CT Results  (Last 48 hours)               07/17/19 1622  CTA CHEST W OR W WO CONT Final result    Impression:  IMPRESSION:   1. No acute pulmonary embolism or other acute abnormality in the chest.       2. Stable low-density liver lesions previously seen to represent hemangiomas. 3. A partially visualized mixed sclerotic and lytic lesion in the L1 vertebral   body is unchanged dating back to 2009 and therefore benign.  No other bony lesion   or acute fracture. Narrative:  EXAM:  CT angiography chest       INDICATION: Pleuritic chest pain. Thoracic spine tenderness to palpation. COMPARISON: Radiographs 7/17/2019. CT ABD pelvis 5/4/2019. TECHNIQUE: Helical thin section chest CT following uneventful intravenous   administration of nonionic contrast according to departmental PE protocol. Coronal and sagittal reformats were performed. 3D/MIP post processing was   performed. CT dose reduction was achieved through use of a standardized protocol   tailored for this examination and automatic exposure control for dose   modulation. FINDINGS: This is a good quality study for the evaluation of pulmonary embolism   to the first subsegmental arterial level. There is no pulmonary embolism to this   level. The visualized thyroid gland is unremarkable. The aorta and main pulmonary   artery are normal in caliber. Cardiac size is within normal limits. No   pericardial effusion. No lymphadenopathy by imaging size criteria. The lungs are clear. No pleural effusion or pneumothorax. Central airways are   unremarkable. Low density liver lesions previously seen to represent hemangiomas are   unchanged. There is no acute abnormality in the visualized upper abdomen. There is a partially visualized mixed sclerotic and lytic bony lesion involving   the anterior and posterior elements of the L1 vertebral body. This is unchanged   dating back to 9/9/2009. No other bony lesion is identified. CXR Results  (Last 48 hours)               07/17/19 1452  XR CHEST PA LAT Final result    Impression:  IMPRESSION:       No acute process. Stable exam.           Narrative:  EXAM:  XR CHEST PA LAT       INDICATION: Shortness of breath with exertion       COMPARISON: 5/4/2019       TECHNIQUE: PA and lateral chest views       FINDINGS: The cardiomediastinal contours are stable. The pulmonary vasculature   is within normal limits.         The lungs and pleural spaces are clear. There is no pneumothorax. The bones and   upper abdomen are stable. Medical Decision Making   I am the first provider for this patient. I reviewed the vital signs, available nursing notes, past medical history, past surgical history, family history and social history. Vital Signs-Reviewed the patient's vital signs. Patient Vitals for the past 12 hrs:   Temp Pulse Resp BP SpO2   07/17/19 1810     99 %   07/17/19 1808    150/82    07/17/19 1553    148/76    07/17/19 1310 98.1 °F (36.7 °C) 94 17 (!) 161/98 97 %       EKG interpretation: (Preliminary)  Rhythm: normal sinus rhythm; and regular . Rate (approx.): 68; Axis: normal; P wave: normal; QRS interval: normal ; ST/T wave: normal;  was interpreted by Tammy Coates MD,ED Provider. Records Reviewed: Nursing Notes and Old Medical Records    Provider Notes (Medical Decision Making): On presentation, the patient is well appearing, in no acute distress with normal vital signs. Based on my history and exam the differential diagnosis for this patient includes musculoskeletal back pain, compression fracture, pathologic fracture, epidural abscess, myelitis. Also considered possible intrathoracic pathology. No urine/bowel incontinence or perianal numbess to suggest cauda equina. No  fever/chills, IVDA to suggest epidural abscess or discitis. No focal weakness or sensory changes to suggest transverse myelitis. Therefore emergent MRI not indicated. No signs of pulmonary embolism or other intrathoracic pathology. . I have recommended rest, avoiding heavy lifting until better, use of intermittent heat (avoid sleeping on a heating pad). Patient has adequate pain medication at home. Feel that he should follow-up with orthopedics and recommended that he call tomorrow for an appointment.   The patient was discharged in stable condition with return precautions given and instructions to follow up with their primary care physician. .ED Course:   Initial assessment performed. The patients presenting problems have been discussed, and they are in agreement with the care plan formulated and outlined with them. I have encouraged them to ask questions as they arise throughout their visit. PROGRESS NOTE:  The patient has been re-evaluated and is ready for discharge. Reviewed available results with patient and have counseled them on diagnosis and care plan. They have expressed understanding, and all their questions have been answered. They agree with plan and agree to have pt F/U as recommended, or return to the ED if their sxs worsen. Discharge instructions have been provided and explained to them, along with reasons to have pt return to the ED. The patient is amenable to discharge so will discharge pt at this time    Violeta Gutierrez MD      Disposition:  home    PLAN:  1. Discharge Medication List as of 7/17/2019  6:22 PM        2. Follow-up Information     Follow up With Specialties Details Why Contact Info    Yanet Valenzuela MD Internal Medicine Schedule an appointment as soon as possible for a visit in 2 days  [facility] [address]      Reuben De La Rosa MD Orthopedic Surgery Schedule an appointment as soon as possible for a visit in 1 day  [address].  [phone]          Return to ED if worse     Diagnosis     Clinical Impression:   1.  Midline thoracic back pain, unspecified chronicity

## 2019-07-17 NOTE — DISCHARGE INSTRUCTIONS
Patient Education        Back Pain: Care Instructions  Your Care Instructions    Back pain has many possible causes. It is often related to problems with muscles and ligaments of the back. It may also be related to problems with the nerves, discs, or bones of the back. Moving, lifting, standing, sitting, or sleeping in an awkward way can strain the back. Sometimes you don't notice the injury until later. Arthritis is another common cause of back pain. Although it may hurt a lot, back pain usually improves on its own within several weeks. Most people recover in 12 weeks or less. Using good home treatment and being careful not to stress your back can help you feel better sooner. Follow-up care is a key part of your treatment and safety. Be sure to make and go to all appointments, and call your doctor if you are having problems. It's also a good idea to know your test results and keep a list of the medicines you take. How can you care for yourself at home? · Sit or lie in positions that are most comfortable and reduce your pain. Try one of these positions when you lie down:  ? Lie on your back with your knees bent and supported by large pillows. ? Lie on the floor with your legs on the seat of a sofa or chair. ? Lie on your side with your knees and hips bent and a pillow between your legs. ? Lie on your stomach if it does not make pain worse. · Do not sit up in bed, and avoid soft couches and twisted positions. Bed rest can help relieve pain at first, but it delays healing. Avoid bed rest after the first day of back pain. · Change positions every 30 minutes. If you must sit for long periods of time, take breaks from sitting. Get up and walk around, or lie in a comfortable position. · Try using a heating pad on a low or medium setting for 15 to 20 minutes every 2 or 3 hours. Try a warm shower in place of one session with the heating pad. · You can also try an ice pack for 10 to 15 minutes every 2 to 3 hours. Put a thin cloth between the ice pack and your skin. · Take pain medicines exactly as directed. ? If the doctor gave you a prescription medicine for pain, take it as prescribed. ? If you are not taking a prescription pain medicine, ask your doctor if you can take an over-the-counter medicine. · Take short walks several times a day. You can start with 5 to 10 minutes, 3 or 4 times a day, and work up to longer walks. Walk on level surfaces and avoid hills and stairs until your back is better. · Return to work and other activities as soon as you can. Continued rest without activity is usually not good for your back. · To prevent future back pain, do exercises to stretch and strengthen your back and stomach. Learn how to use good posture, safe lifting techniques, and proper body mechanics. When should you call for help? Call your doctor now or seek immediate medical care if:    · You have new or worsening numbness in your legs.     · You have new or worsening weakness in your legs. (This could make it hard to stand up.)     · You lose control of your bladder or bowels.    Watch closely for changes in your health, and be sure to contact your doctor if:    · You have a fever, lose weight, or don't feel well.     · You do not get better as expected. Where can you learn more? Go to http://anni-delfin.info/. Enter L275 in the search box to learn more about \"Back Pain: Care Instructions. \"  Current as of: September 20, 2018  Content Version: 11.9  © 3222-6382 [employer]. Care instructions adapted under license by [employer] (which disclaims liability or warranty for this information). If you have questions about a medical condition or this instruction, always ask your healthcare professional. [place] [address] any warranty or liability for your use of this information.        Patient Education        Upper and Middle Back (Thoracic) Strain: Care Instructions  Overview    A back strain happens when you overstretch, or pull, a muscle in your back. You may hurt your back in an accident or when you exercise or lift something. Sometimes you may not know how you hurt your back. Most back pain will get better with rest and time. You can take care of yourself at home to help your back heal.  Follow-up care is a key part of your treatment and safety. Be sure to make and go to all appointments, and call your doctor if you are having problems. It's also a good idea to know your test results and keep a list of the medicines you take. How can you care for yourself at home? · Try to stay as active as you can, but stop or reduce any activity that causes pain. · You can try using heat or ice to see if it helps. ? Try using a heating pad on a low or medium setting for 15 to 20 minutes every 2 to 3 hours. Try a warm shower in place of one session with the heating pad. You can also buy single-use heat wraps that last up to 8 hours. ? You can also try an ice pack for 10 to 15 minutes every 2 to 3 hours. Put a thin cloth between the ice and your skin. · Be safe with medicines. Read and follow all instructions on the label. ? If the doctor gave you a prescription medicine for pain, take it as prescribed. ? If you are not taking a prescription pain medicine, ask your doctor if you can take an over-the-counter medicine. · Try to find a comfortable sleeping position. ? Instead of your regular pillow, you can try a special neck pillow or a rolled-up towel under your neck. ? Try lying on your side with a pillow between your legs. Or lie on your back with a pillow under your knees. · Return to your usual level of activity slowly. When should you call for help? Call 911 anytime you think you may need emergency care.  For example, call if:    · You are unable to move an arm or a leg at all.   [facility] your doctor now or seek immediate medical care if:    · You have new or worse symptoms in your arms, legs, chest, belly, or buttocks. Symptoms may include:  ? Numbness or tingling. ? Weakness. ? Pain.     · You lose bladder or bowel control.    Watch closely for changes in your health, and be sure to contact your doctor if:    · You are not getting better as expected. Where can you learn more? Go to http://anni-delfin.info/. Enter U110 in the search box to learn more about \"Upper and Middle Back (Thoracic) Strain: Care Instructions. \"  Current as of: September 20, 2018  Content Version: 11.9  © 2421-7386 [employer]. Care instructions adapted under license by [employer] (which disclaims liability or warranty for this information). If you have questions about a medical condition or this instruction, always ask your healthcare professional. [address] any warranty or liability for your use of this information.

## 2019-07-18 ENCOUNTER — TELEPHONE (OUTPATIENT)
Dept: INTERNAL MEDICINE CLINIC | Age: 69
End: 2019-07-18

## 2019-07-18 ENCOUNTER — APPOINTMENT (OUTPATIENT)
Dept: PHYSICAL THERAPY | Age: 69
End: 2019-07-18

## 2019-07-18 NOTE — TELEPHONE ENCOUNTER
Phoned patient he states that he cant get in with Danish Ortega until August 2. I advised him if his symptoms get worse he will need to go to the [facility] on call after hours clinic with [facility].

## 2019-07-18 NOTE — TELEPHONE ENCOUNTER
Patient is calling, he is requesting a call back from [place] in regards to his recent back pain- he would like to further discuss this with her.     Best call back # for patient: [phone]

## 2019-07-19 ENCOUNTER — APPOINTMENT (OUTPATIENT)
Dept: PHYSICAL THERAPY | Age: 69
End: 2019-07-19

## 2019-09-09 DIAGNOSIS — F51.04 CHRONIC INSOMNIA: ICD-10-CM

## 2019-09-10 RX ORDER — ZOLPIDEM TARTRATE 5 MG/1
TABLET ORAL
Qty: 90 TAB | Refills: 1 | Status: SHIPPED | OUTPATIENT
Start: 2019-09-10 | End: 2020-03-11 | Stop reason: SDUPTHER

## 2020-03-11 ENCOUNTER — TELEPHONE (OUTPATIENT)
Dept: INTERNAL MEDICINE CLINIC | Age: 70
End: 2020-03-11

## 2020-04-02 ENCOUNTER — OFFICE VISIT (OUTPATIENT)
Dept: INTERNAL MEDICINE CLINIC | Age: 70
End: 2020-04-02

## 2020-04-02 VITALS
TEMPERATURE: 98.6 F | WEIGHT: 183.4 LBS | HEART RATE: 84 BPM | HEIGHT: 72 IN | RESPIRATION RATE: 16 BRPM | BODY MASS INDEX: 24.84 KG/M2 | DIASTOLIC BLOOD PRESSURE: 84 MMHG | SYSTOLIC BLOOD PRESSURE: 130 MMHG | OXYGEN SATURATION: 95 %

## 2020-04-02 DIAGNOSIS — Z79.899 IMMUNOSUPPRESSION DUE TO DRUG THERAPY (HCC): ICD-10-CM

## 2020-04-02 DIAGNOSIS — D84.821 IMMUNOSUPPRESSION DUE TO DRUG THERAPY (HCC): ICD-10-CM

## 2020-04-02 DIAGNOSIS — E78.00 HYPERCHOLESTEROLEMIA: ICD-10-CM

## 2020-04-02 DIAGNOSIS — Z00.00 INITIAL MEDICARE ANNUAL WELLNESS VISIT: Primary | ICD-10-CM

## 2020-04-02 DIAGNOSIS — L40.50 PSORIATIC ARTHRITIS (HCC): ICD-10-CM

## 2020-04-02 DIAGNOSIS — R97.20 PSA ELEVATION: ICD-10-CM

## 2020-04-02 DIAGNOSIS — I25.10 CORONARY ARTERY DISEASE INVOLVING NATIVE CORONARY ARTERY OF NATIVE HEART WITHOUT ANGINA PECTORIS: ICD-10-CM

## 2020-04-02 DIAGNOSIS — F51.04 CHRONIC INSOMNIA: ICD-10-CM

## 2020-04-02 DIAGNOSIS — Z11.59 NEED FOR HEPATITIS C SCREENING TEST: ICD-10-CM

## 2020-04-02 NOTE — PATIENT INSTRUCTIONS
Learning About Coronary Artery Disease (CAD) What is coronary artery disease? Coronary artery disease (CAD) occurs when plaque builds up in the arteries that bring oxygen-rich blood to your heart. Plaque is a fatty substance made of cholesterol, calcium, and other substances in the blood. This process is called hardening of the arteries, or atherosclerosis. What happens when you have coronary artery disease? · Plaque may narrow the coronary arteries. Narrowed arteries cause poor blood flow. This can lead to angina symptoms such as chest pain or discomfort. If blood flow is completely blocked, you could have a heart attack. · You can slow CAD and reduce the risk of future problems by making changes in your lifestyle. These include quitting smoking and eating heart-healthy foods. · Treatments for CAD, along with changes in your lifestyle, can help you live a longer and healthier life. How can you prevent coronary artery disease? · Do not smoke. It may be the best thing you can do to prevent heart disease. If you need help quitting, talk to your doctor about stop-smoking programs and medicines. These can increase your chances of quitting for good. · Be active. Get at least 30 minutes of exercise on most days of the week. Walking is a good choice. You also may want to do other activities, such as running, swimming, cycling, or playing tennis or team sports. · Eat heart-healthy foods. Eat more fruits and vegetables and less foods that contain saturated and trans fats. Limit alcohol, sodium, and sweets. · Stay at a healthy weight. Lose weight if you need to. · Manage other health problems such as diabetes, high blood pressure, and high cholesterol. How is coronary artery disease treated? · Your doctor will suggest that you make lifestyle changes. For example, your doctor may ask you to eat healthy foods, quit smoking, lose extra weight, and be more active. · You will have to take medicines. · Your doctor may suggest a procedure to open narrowed or blocked arteries. This is called angioplasty. Or your doctor may suggest using healthy blood vessels to create detours around narrowed or blocked arteries. This is called bypass surgery. Follow-up care is a key part of your treatment and safety. Be sure to make and go to all appointments, and call your doctor if you are having problems. It's also a good idea to know your test results and keep a list of the medicines you take. Where can you learn more? Go to http://anni-delfin.info/ Enter ([phone] in the search box to learn more about \"Learning About Coronary Artery Disease (CAD). \" Current as of: December 15, 2019Content Version: 12.4 © 4017-1116 [employer]. Care instructions adapted under license by [employer] (which disclaims liability or warranty for this information). If you have questions about a medical condition or this instruction, always ask your healthcare professional. [address] any warranty or liability for your use of this information.

## 2020-04-02 NOTE — PROGRESS NOTES
Chief Complaint   Patient presents with    Annual Wellness Visit    Follow Up Chronic Condition       Depression Risk Factor Screening:     3 most recent PHQ Screens 4/2/2020   Little interest or pleasure in doing things Not at all   Feeling down, depressed, irritable, or hopeless Not at all   Total Score PHQ 2 0       Functional Ability and Level of Safety:     Activities of Daily Living  ADL Assessment 4/2/2020   Feeding yourself No Help Needed   Getting from bed to chair No Help Needed   Getting dressed No Help Needed   Bathing or showering No Help Needed   Walk across the room (includes cane/walker) No Help Needed   Using the telphone No Help Needed   Taking your medications No Help Needed   Preparing meals No Help Needed   Managing money (expenses/bills) No Help Needed   Moderately strenuous housework (laundry) No Help Needed   Shopping for personal items (toiletries/medicines) No Help Needed   Shopping for groceries No Help Needed   Driving No Help Needed   Climbing a flight of stairs No Help Needed   Getting to places beyond walking distances No Help Needed       Fall Risk  Fall Risk Assessment, last 12 mths 4/2/2020   Able to walk? Yes   Fall in past 12 months? No       Abuse Screen  Abuse Screening Questionnaire 4/2/2020   Do you ever feel afraid of your partner? N   Are you in a relationship with someone who physically or mentally threatens you? N   Is it safe for you to go home?  Y         Patient Care Team   Patient Care Team:  Samanta Horta MD as PCP - General (Internal Medicine)  Samanta Horta MD as PCP - [facility] Glezled Provider  Yissel Warren MD (General Surgery)

## 2020-04-02 NOTE — PROGRESS NOTES
This note will not be viewable in [address] Viv Eaton is a 65 y.o. male who presents for an Initial Medicare Annual Wellness Exam (AWV) and follow up of chronic medical conditions. I have reviewed the patient's medical history in detail and updated the computerized patient record. History     Subjective:  Mr. Sarah Sanchez is a 33-year-old  male who presents to the office today for Medicare wellness check and follow-up of multiple medical problems. The patient has hypercholesterolemia currently on Lipitor therapy. He tolerates this without muscle soreness or GI upset. He has a history of coronary artery disease for which he has had 3 stents and continues to take aspirin on a daily basis. He is followed yearly by Dr. Oswlad Ventura who does a cholesterol check on him at that time. He denies any exertional chest pains or claudication. The patient has psoriatic arthritis and is currently on methotrexate as well as Cosentyx. He has been followed by Dr. Abhinav perales. He does take a folic acid supplement. He has laboratory studies done every 3 months and has been doing well with the medication without exacerbation of symptoms. He has BPH with elevated PSA and is followed by Dr. Laurance Hatchet for this. He remains on Cardura and Proscar. He is not have any urinary flow problems and his last PSA test was less than 2. The patient has chronic insomnia and does take Ambien on a chronic basis for this. He finds that the medication is effective in getting him to sleep and keeping him asleep through the night. He has had no tolerance to the medication over time and he denies any hangover effect or problems with memory.     Patient Active Problem List   Diagnosis Code    Psoriatic arthritis ([facility] [address] L40.50    Coronary artery disease involving native coronary artery I25.10    BPH with obstruction/lower urinary tract symptoms N40.1, N13.8    Hypercholesterolemia E78.00    Primary open angle glaucoma of right eye H40.1110    PSA elevation R97.20    Chronic insomnia F51.04    Immunosuppression due to drug therapy Z79.899    Right inguinal hernia K40.90    Thoracic back sprain S23. 3AGC       Patient Care Team:  William Shelley MD as PCP - General (Internal Medicine)  William Shelley MD as PCP - [facility]  Jennifer Diaz MD (General Surgery)    Past Medical History:   Diagnosis Date    BPH with obstruction/lower urinary tract symptoms 3/23/2018    Chronic insomnia 3/23/2018    Coronary artery disease involving native coronary artery 3/23/2018    Status post stents ×3    Hypercholesterolemia 3/23/2018    Immunosuppression due to drug therapy 4/10/2019    Primary open angle glaucoma of right eye 3/23/2018    PSA elevation 3/23/2018    Status post biopsy. Atypical small acinar proliferation    Psoriatic arthritis ([facility] [address]      Past Surgical History:   Procedure Laterality Date    COLONOSCOPY N/A 12/13/2018    COLONOSCOPY performed by Aurea Delgadillo MD at [facility] ENDOSCOPY    HX HEART CATHETERIZATION  05/2015    HX HERNIA REPAIR  05/29/2019    Open repair of right inguinal hernia with mesh     HX KNEE ARTHROSCOPY Bilateral     HX ORTHOPAEDIC Right     Cubital tunnel sx    IR INJ FORAMIN EPID LUMB ANES/STER ADDL  4/23/2019    IR INJ FORAMIN EPID LUMB ANES/STER SNGL  4/23/2019     No Known Allergies  Current Outpatient Medications   Medication Sig Dispense Refill    zolpidem (AMBIEN) 5 mg tablet TAKE 1 TABLET BY MOUTH NIGHTLY . DO NOT EXCEED 1 PER 24 HOURS 90 Tab 0    latanoprost (XALATAN) 0.005 % ophthalmic solution       ibuprofen (MOTRIN) 800 mg tablet Take 1 Tab by mouth three (3) times daily (with meals). 90 Tab 0    cholecalciferol, VITAMIN D3, (VITAMIN D3) 5,000 unit tab tablet Take 5,000 Units by mouth daily.  ascorbic acid, vitamin C, (VITAMIN C) 500 mg tablet Take 1,000 mg by mouth daily.       docusate sodium (COLACE) 100 mg capsule Take 1 Cap by mouth two (2) times a day. (Patient taking differently: Take 100 mg by mouth two (2) times daily as needed.) 30 Cap 0    secukinumab (COSENTYX PEN) 150 mg/mL pnij by SubCUTAneous route every thirty (30) days.  atorvastatin (LIPITOR) 20 mg tablet Take 1 Tab by mouth nightly. 30 Tab 6    aspirin delayed-release 81 mg tablet Take  by mouth daily.  doxazosin (CARDURA) 4 mg tablet Take 4 mg by mouth nightly. Indications: BENIGN PROSTATIC HYPERTROPHY      oxybutynin chloride XL 10 mg CR tablet Take 10 mg by mouth nightly.  finasteride (PROSCAR) 5 mg tablet Take 5 mg by mouth nightly.  folic acid (FOLVITE) 1 mg tablet Take 1 mg by mouth daily.  methotrexate (RHEUMATREX) 2.5 mg tablet Take 15 mg by mouth every .  naloxone (NARCAN) 4 mg/actuation nasal spray Use 1 spray intranasally, then discard. Repeat with new spray every 2 min as needed for opioid overdose symptoms, alternating nostrils.  (Patient not taking: Reported on 2019) 2 Each 0     Social History     Socioeconomic History    Marital status:      Spouse name: Not on file    Number of children: Not on file    Years of education: Not on file    Highest education level: Not on file   Tobacco Use    Smoking status: Former Smoker     Packs/day: 0.25     Years: 2.00     Pack years: 0.50     Last attempt to quit: 1972     Years since quittin.2    Smokeless tobacco: Never Used   Substance and Sexual Activity    Alcohol use: No    Drug use: No     Family History   Problem Relation Age of Onset    Bleeding Prob Mother     No Known Problems Father     Cancer Sister         rectal    Heart Disease Brother     Hypertension Brother      Health Maintenance   Topic Date Due    Hepatitis C Screening  1950    Lipid Screen  1958    DTaP/Tdap/Td series (1 - Tdap) 1969    Shingrix Vaccine Age 50> (1 of 2) 1998    Pneumococcal 65+ years (1 of 1 - PPSV23) 2013    GLAUCOMA SCREENING Q2Y 06/13/2020    Influenza Age 5 to Adult  08/01/2020    Medicare Yearly Exam  04/03/2021    Colonoscopy  12/13/2028    AAA Screening 73-67 YO Male Smoking Patients  Completed          Review of Systems  Constitutional: negative for fevers, chills, anorexia and weight loss  Eyes:   negative for visual disturbance and irritation  ENT:   negative for tinnitus,sore throat,nasal congestion,ear pain,hoarseness  Respiratory:  negative for cough, hemoptysis, dyspnea,wheezing  CV:   negative for chest pain, palpitations, lower extremity edema  GI:   negative for nausea, vomiting, diarrhea, abdominal pain,melena  Endo:               negative for polyuria,polydipsia,polyphagia,heat intolerance  Genitourinary: negative for frequency, dysuria and hematuria  Integumentary: negative for rash and pruritus  Hematologic:  negative for easy bruising and gum/nose bleeding  Musculoskel: negative for myalgias, arthralgias, back pain, muscle weakness, joint pain  Neurological:  negative for headaches, dizziness, vertigo, memory problems and gait   Behavl/Psych: negative for feelings of anxiety, depression, mood changes  ROS otherwise negative      Depression Risk Factor Screening:     3 most recent PHQ Screens 4/2/2020   Little interest or pleasure in doing things Not at all   Feeling down, depressed, irritable, or hopeless Not at all   Total Score PHQ 2 0       Alcohol Risk Factor Screening: You do not drink alcohol or very rarely. Functional Ability and Level of Safety:   Hearing Loss  Hearing is good.     Activities of Daily Living  ADL Assessment 4/2/2020   Feeding yourself No Help Needed   Getting from bed to chair No Help Needed   Getting dressed No Help Needed   Bathing or showering No Help Needed   Walk across the room (includes cane/walker) No Help Needed   Using the telphone No Help Needed   Taking your medications No Help Needed   Preparing meals No Help Needed   Managing money (expenses/bills) No Help Needed Moderately strenuous housework (laundry) No Help Needed   Shopping for personal items (toiletries/medicines) No Help Needed   Shopping for groceries No Help Needed   Driving No Help Needed   Climbing a flight of stairs No Help Needed   Getting to places beyond walking distances No Help Needed       Fall Risk  Fall Risk Assessment, last 12 mths 4/2/2020   Able to walk? Yes   Fall in past 12 months? No       Abuse Screen  Abuse Screening Questionnaire 4/2/2020   Do you ever feel afraid of your partner? N   Are you in a relationship with someone who physically or mentally threatens you? N   Is it safe for you to go home? Y       Cognitive Screening   Evaluation of Cognitive Function:  Has your family/caregiver stated any concerns about your memory: no    Physical Exam     Visit Vitals  /84   Pulse 84   Temp 98.6 °F (37 °C) (Oral)   Resp 16   Ht 6' (1.829 m)   Wt 183 lb 6.4 oz (83.2 kg)   SpO2 95%   BMI 24.87 kg/m²     Body mass index is 24.87 kg/m². General appearance - alert, well appearing, and in no distress  Mental status - alert, oriented to person, place, and time  EYE-GROVER, EOMI,conjunctiva normal bilaterally, lids normal  ENT-ENT exam normal, no neck nodes or sinus tenderness  Nose - normal and patent, no erythema,  Or discharge   Mouth - mucous membranes moist, pharynx normal without lesions  Neck - supple, no significant adenopathy or bruit  Chest - clear to auscultation, no wheezes, rales or rhonchi. Heart - normal rate, regular rhythm, normal S1, S2, no murmurs, rubs, clicks or gallops   Abdomen - soft, nontender, nondistended, no masses or organomegaly  Lymph- no adenopathy palpable  Ext-peripheral pulses normal, no pedal edema, no clubbing or cyanosis  Skin-Warm and dry.  no hyperpigmentation, vitiligo, or suspicious lesions  Neuro -alert, oriented, normal speech, no focal findings or movement disorder noted    Assessment/Plan   [place] education and counseling provided:  Age appropriate evidence-based preventive care recommendations based on today's review and evaluation; including relevant cancer screening guidelines, and vaccination recommendations. An After Visit Summary was printed and given to the patient which information about these guidelines, and a personalized schedule for health maintenance items. Whe appropriate and with patient agreement, orders noted below were placed to complete missing health maintenance items. Additional Plan for follow up chronic medical conditions includes:  Diagnoses and all orders for this visit:    Initial Medicare annual wellness visit    Psoriatic arthritis ([facility] [address]    PSA elevation    Immunosuppression due to drug therapy    Hypercholesterolemia    Coronary artery disease involving native coronary artery of native heart without angina pectoris    Chronic insomnia    Need for hepatitis C screening test  -     HEPATITIS C AB          Other instructions: The patient's medications were reviewed and reconciled. No change in his current medical regimen will be made. A prudent diet and exercise is encouraged    Health maintenance issues were reviewed and CDC recommended hepatitis C screening will be obtained. Age-appropriate vaccinations were reviewed and Prevnar 13, Pneumovax 23, Tdap and shingles vaccine were all recommended which she will consider getting later this year once covid-19 pandemic has improved    A controlled substance agreement was endorsed today. Continue rheumatology and cardiology and urology follow-ups. Follow-up 6 months    Follow-up and Dispositions    · Return in about 6 months (around 10/2/2020). I have reviewed with the patient details of the assessment and plan and all questions were answered. Relevent patient education was performed. The most recent lab findings were reviewed with the patient.     Agnes Manzo MD    Please note that this dictation was completed with [employer], the [employer] voice recognition software. Quite often unanticipated grammatical, syntax, homophones, and other interpretive errors are inadvertently transcribed by the computer software. Please disregard these errors. Please excuse any errors that have escaped final proofreading.

## 2020-04-03 LAB — HCV AB S/CO SERPL IA: <0.1 S/CO RATIO (ref 0–0.9)

## 2020-06-08 DIAGNOSIS — F51.04 CHRONIC INSOMNIA: ICD-10-CM

## 2020-06-09 RX ORDER — ZOLPIDEM TARTRATE 5 MG/1
TABLET ORAL
Qty: 30 TAB | Refills: 2 | Status: SHIPPED | OUTPATIENT
Start: 2020-06-09 | End: 2020-09-08

## 2020-07-14 ENCOUNTER — TELEPHONE (OUTPATIENT)
Dept: INTERNAL MEDICINE CLINIC | Age: 70
End: 2020-07-14

## 2020-09-08 DIAGNOSIS — F51.04 CHRONIC INSOMNIA: ICD-10-CM

## 2020-09-08 RX ORDER — ZOLPIDEM TARTRATE 5 MG/1
TABLET ORAL
Qty: 30 TAB | Refills: 2 | Status: SHIPPED | OUTPATIENT
Start: 2020-09-08 | End: 2020-12-09

## 2020-09-15 ENCOUNTER — OFFICE VISIT (OUTPATIENT)
Dept: INTERNAL MEDICINE CLINIC | Age: 70
End: 2020-09-15
Payer: MEDICARE

## 2020-09-15 VITALS
SYSTOLIC BLOOD PRESSURE: 118 MMHG | BODY MASS INDEX: 24.71 KG/M2 | OXYGEN SATURATION: 97 % | HEIGHT: 72 IN | HEART RATE: 80 BPM | DIASTOLIC BLOOD PRESSURE: 80 MMHG | TEMPERATURE: 98.1 F | WEIGHT: 182.4 LBS | RESPIRATION RATE: 16 BRPM

## 2020-09-15 DIAGNOSIS — F51.04 CHRONIC INSOMNIA: Primary | ICD-10-CM

## 2020-09-15 PROCEDURE — G8420 CALC BMI NORM PARAMETERS: HCPCS | Performed by: INTERNAL MEDICINE

## 2020-09-15 PROCEDURE — 3017F COLORECTAL CA SCREEN DOC REV: CPT | Performed by: INTERNAL MEDICINE

## 2020-09-15 PROCEDURE — G8432 DEP SCR NOT DOC, RNG: HCPCS | Performed by: INTERNAL MEDICINE

## 2020-09-15 PROCEDURE — G8536 NO DOC ELDER MAL SCRN: HCPCS | Performed by: INTERNAL MEDICINE

## 2020-09-15 PROCEDURE — 99213 OFFICE O/P EST LOW 20 MIN: CPT | Performed by: INTERNAL MEDICINE

## 2020-09-15 PROCEDURE — G8427 DOCREV CUR MEDS BY ELIG CLIN: HCPCS | Performed by: INTERNAL MEDICINE

## 2020-09-15 PROCEDURE — 1101F PT FALLS ASSESS-DOCD LE1/YR: CPT | Performed by: INTERNAL MEDICINE

## 2020-09-15 NOTE — PATIENT INSTRUCTIONS

## 2020-09-15 NOTE — PROGRESS NOTES
Paul Rod is a 65 y.o. male presenting for Follow Up Chronic Condition  . 1. Have you been to the ER, urgent care clinic since your last visit? Hospitalized since your last visit? No    2. Have you seen or consulted any other health care providers outside of the [address] [place] [address] since your last visit? Include any pap smears or colon screening. Eye Dr Raman Armstrong, last 12 mths 4/2/2020   Able to walk? Yes   Fall in past 12 months? No         Abuse Screening Questionnaire 4/2/2020   Do you ever feel afraid of your partner? N   Are you in a relationship with someone who physically or mentally threatens you? N   Is it safe for you to go home? Y       3 most recent PHQ Screens 4/2/2020   Little interest or pleasure in doing things Not at all   Feeling down, depressed, irritable, or hopeless Not at all   Total Score PHQ 2 0       There are no discontinued medications.

## 2020-09-15 NOTE — PROGRESS NOTES
This note will not be viewable in [address] Subjective:     Mr. Ben Encarnacion returns to the office today in follow-up of his chronic insomnia. The patient remains on Ambien 5 mg which he takes most nights. The medication allows him to fall asleep fairly quickly and to keep him asleep at night. He does awaken as he has nocturia related to BPH. The patient does feel refreshed in the morning and does not have any type of hangover effect. He denies any changes in memory. Past Medical History:   Diagnosis Date    BPH with obstruction/lower urinary tract symptoms 3/23/2018    Chronic insomnia 3/23/2018    Coronary artery disease involving native coronary artery 3/23/2018    Status post stents ×3    Hypercholesterolemia 3/23/2018    Immunosuppression due to drug therapy 4/10/2019    Primary open angle glaucoma of right eye 3/23/2018    PSA elevation 3/23/2018    Status post biopsy. Atypical small acinar proliferation    Psoriatic arthritis ([address]      Past Surgical History:   Procedure Laterality Date    COLONOSCOPY N/A 12/13/2018    COL[facility] by [facility]natu [facility] at [facility] ENDOSCOPY    HX HEART CATHETERIZATION  05/2015    HX HERNIA REPAIR  05/29/2019    Open repair of right inguinal hernia with mesh     HX KNEE ARTHROSCOPY Bilateral     HX ORTHOPAEDIC Right     Cubital tunnel sx    IR INJ FORAMIN EPID LUMB ANES/STER ADDL  4/23/2019    IR INJ FORAMIN EPID LUMB ANES/STER SNGL  4/23/2019     No Known Allergies  Current Outpatient Medications   Medication Sig Dispense Refill    zolpidem (AMBIEN) 5 mg tablet TAKE 1 TABLET BY MOUTH EVERYDAY AT BEDTIME 30 Tab 2    latanoprost (XALATAN) 0.005 % ophthalmic solution       ibuprofen (MOTRIN) 800 mg tablet Take 1 Tab by mouth three (3) times daily (with meals). 90 Tab 0    naloxone (NARCAN) 4 mg/actuation nasal spray Use 1 spray intranasally, then discard.  Repeat with new spray every 2 min as needed for opioid overdose symptoms, alternating nostrils. 2 Each 0    cholecalciferol, VITAMIN D3, (VITAMIN D3) 5,000 unit tab tablet Take 5,000 Units by mouth daily.  ascorbic acid, vitamin C, (VITAMIN C) 500 mg tablet Take 1,000 mg by mouth daily.  docusate sodium (COLACE) 100 mg capsule Take 1 Cap by mouth two (2) times a day. (Patient taking differently: Take 100 mg by mouth two (2) times daily as needed.) 30 Cap 0    secukinumab (COSENTYX PEN) 150 mg/mL pnij by SubCUTAneous route every thirty (30) days.  atorvastatin (LIPITOR) 20 mg tablet Take 1 Tab by mouth nightly. 30 Tab 6    aspirin delayed-release 81 mg tablet Take  by mouth daily.  doxazosin (CARDURA) 4 mg tablet Take 4 mg by mouth nightly. Indications: BENIGN PROSTATIC HYPERTROPHY      oxybutynin chloride XL 10 mg CR tablet Take 10 mg by mouth nightly.  finasteride (PROSCAR) 5 mg tablet Take 5 mg by mouth nightly.  folic acid (FOLVITE) 1 mg tablet Take 1 mg by mouth daily.  methotrexate (RHEUMATREX) 2.5 mg tablet Take 15 mg by mouth every .        Social History     Socioeconomic History    Marital status:      Spouse name: Not on file    Number of children: Not on file    Years of education: Not on file    Highest education level: Not on file   Tobacco Use    Smoking status: Former Smoker     Packs/day: 0.25     Years: 2.00     Pack years: 0.50     Last attempt to quit: 1972     Years since quittin.7    Smokeless tobacco: Never Used   Substance and Sexual Activity    Alcohol use: No    Drug use: No     Family History   Problem Relation Age of Onset    Bleeding Prob Mother     No Known Problems Father     Cancer Sister         rectal    Heart Disease Brother     Hypertension Brother        Review of Systems:  GEN: no weight loss, weight gain, fatigue or night sweats  CV: no PND, orthopnea, or palpitations  Resp: no dyspnea on exertion, no cough  Abd: no nausea, vomiting or diarrhea  EXT: denies edema, claudication  Endocrine: no hair loss, excessive thirst or polyuria  Neurological ROS: no TIA or stroke symptoms  ROS otherwise negative      Objective:     Visit Vitals  /80 (BP 1 Location: Right arm, BP Patient Position: Sitting)   Pulse 80   Temp 98.1 °F (36.7 °C) (Oral)   Resp 16   Ht 6' (1.829 m)   Wt 182 lb 6.4 oz (82.7 kg)   SpO2 97%   BMI 24.74 kg/m²     Body mass index is 24.74 kg/m². Physical exam not performed         Assessment/Plan:     Diagnoses and all orders for this visit:    Chronic insomnia        Other instructions: The patient's medications were reviewed and reconciled. The patient continues to benefit from the use of Ambien to help him sleep as it allows him to fall asleep quickly and to stay asleep for longer periods of time, feeling refreshed upon arising and without any side effect. 15-minute face-to-face office visit occurred today in discussion of his insomnia, current treatment, effectiveness of medication, potential side effects, etc.    Follow-up every 6 months    Follow-up and Dispositions    · Return in about 6 months (around 3/15/2021). Dar Leal MD    Please note that this dictation was completed with [employer] the computer voice recognition software. Quite often unanticipated grammatical, syntax, homophones, and other interpretive errors are inadvertently transcribed by the computer software. Please disregard these errors. Please excuse any errors that have escaped final proofreading.

## 2020-12-07 ENCOUNTER — TELEPHONE (OUTPATIENT)
Dept: INTERNAL MEDICINE CLINIC | Age: 70
End: 2020-12-07

## 2020-12-07 NOTE — TELEPHONE ENCOUNTER
Patient states he has a cold. No fever,cough and congestion. He would like to be seen.      [phone]

## 2020-12-08 DIAGNOSIS — F51.04 CHRONIC INSOMNIA: ICD-10-CM

## 2020-12-09 RX ORDER — ZOLPIDEM TARTRATE 5 MG/1
TABLET ORAL
Qty: 30 TAB | Refills: 2 | Status: SHIPPED | OUTPATIENT
Start: 2020-12-09 | End: 2021-03-16 | Stop reason: SDUPTHER

## 2020-12-17 ENCOUNTER — TELEPHONE (OUTPATIENT)
Dept: INTERNAL MEDICINE CLINIC | Age: 70
End: 2020-12-17

## 2020-12-17 NOTE — TELEPHONE ENCOUNTER
Patient is on his 14th day of covid and is still having low grade fevers. He is taking tylenol. What else can he take?      [phone]

## 2021-03-16 ENCOUNTER — OFFICE VISIT (OUTPATIENT)
Dept: INTERNAL MEDICINE CLINIC | Age: 71
End: 2021-03-16
Payer: MEDICARE

## 2021-03-16 VITALS
DIASTOLIC BLOOD PRESSURE: 78 MMHG | SYSTOLIC BLOOD PRESSURE: 120 MMHG | WEIGHT: 180.8 LBS | TEMPERATURE: 98.4 F | BODY MASS INDEX: 24.49 KG/M2 | HEART RATE: 76 BPM | OXYGEN SATURATION: 98 % | HEIGHT: 72 IN | RESPIRATION RATE: 20 BRPM

## 2021-03-16 DIAGNOSIS — E78.00 HYPERCHOLESTEROLEMIA: Primary | ICD-10-CM

## 2021-03-16 DIAGNOSIS — F51.04 CHRONIC INSOMNIA: ICD-10-CM

## 2021-03-16 DIAGNOSIS — L40.50 PSORIATIC ARTHRITIS (HCC): ICD-10-CM

## 2021-03-16 DIAGNOSIS — I25.10 CORONARY ARTERY DISEASE INVOLVING NATIVE CORONARY ARTERY OF NATIVE HEART WITHOUT ANGINA PECTORIS: ICD-10-CM

## 2021-03-16 PROCEDURE — G8510 SCR DEP NEG, NO PLAN REQD: HCPCS | Performed by: INTERNAL MEDICINE

## 2021-03-16 PROCEDURE — G8536 NO DOC ELDER MAL SCRN: HCPCS | Performed by: INTERNAL MEDICINE

## 2021-03-16 PROCEDURE — 3017F COLORECTAL CA SCREEN DOC REV: CPT | Performed by: INTERNAL MEDICINE

## 2021-03-16 PROCEDURE — 99214 OFFICE O/P EST MOD 30 MIN: CPT | Performed by: INTERNAL MEDICINE

## 2021-03-16 PROCEDURE — G8427 DOCREV CUR MEDS BY ELIG CLIN: HCPCS | Performed by: INTERNAL MEDICINE

## 2021-03-16 PROCEDURE — G8420 CALC BMI NORM PARAMETERS: HCPCS | Performed by: INTERNAL MEDICINE

## 2021-03-16 PROCEDURE — 1101F PT FALLS ASSESS-DOCD LE1/YR: CPT | Performed by: INTERNAL MEDICINE

## 2021-03-16 RX ORDER — ZOLPIDEM TARTRATE 10 MG/1
10 TABLET ORAL
Qty: 90 TAB | Refills: 1 | Status: SHIPPED | OUTPATIENT
Start: 2021-03-16 | End: 2021-11-05 | Stop reason: SDUPTHER

## 2021-03-16 NOTE — PROGRESS NOTES
Subjective:     Mr. Jhonathan Dee is to the office today in general medical follow-up. The patient has hypercholesterolemia currently on Lipitor. He tolerates this without muscle soreness or GI upset. The patient has a history of coronary artery disease with previous stents. He denies any exertional chest pains or claudication. The patient has a history of psoriasis and subsequent psoriatic arthritis. He is followed by rheumatology and remains on methotrexate, folic acid, Cosentyx. He has been doing reasonably well. He does have oxycodone to take for pain with flares. He does have a prescription for Narcan. The patient has had no recent exacerbation of symptoms. The patient has chronic insomnia. He has done well on the Ambien 5 mg however he finds that he sometimes needs more than 15 pills/month which is what his insurance will only pay for. He does have chronic tinnitus which keeps him from sleeping and the Ambien is what helps him get to sleep and feel refreshed in the morning. He has had no tolerance to the medication over years of usage and has had no long-term side effects. Past Medical History:   Diagnosis Date    BPH with obstruction/lower urinary tract symptoms 3/23/2018    Chronic insomnia 3/23/2018    Coronary artery disease involving native coronary artery 3/23/2018    Status post stents ×3    Hypercholesterolemia 3/23/2018    Immunosuppression due to drug therapy 4/10/2019    Primary open angle glaucoma of right eye 3/23/2018    PSA elevation 3/23/2018    Status post biopsy.   Atypical small acinar proliferation    Psoriatic arthritis [facility])      Past Surgical History:   Procedure Laterality Date    COLONOSCOPY N/A 12/13/2018    COLON[facility]COPY performed by [facility]le Fong [facility] ENDOSCOPY    HX HEART CATHETERIZATION  05/2015    HX HERNIA REPAIR  05/29/2019    Open repair of right inguinal hernia with mesh     HX KNEE ARTHROSCOPY Bilateral     HX ORTHOPAEDIC Right Cubital tunnel sx    IR INJ FORAMIN EPID LUMB ANES/STER ADDL  4/23/2019    IR INJ FORAMIN EPID LUMB ANES/STER SNGL  4/23/2019     No Known Allergies  Current Outpatient Medications   Medication Sig Dispense Refill    zolpidem (AMBIEN) 10 mg tablet Take 1 Tab by mouth nightly. Max Daily Amount: 10 mg. 90 Tab 1    latanoprost (XALATAN) 0.005 % ophthalmic solution       ibuprofen (MOTRIN) 800 mg tablet Take 1 Tab by mouth three (3) times daily (with meals). 90 Tab 0    naloxone (NARCAN) 4 mg/actuation nasal spray Use 1 spray intranasally, then discard. Repeat with new spray every 2 min as needed for opioid overdose symptoms, alternating nostrils. 2 Each 0    cholecalciferol, VITAMIN D3, (VITAMIN D3) 5,000 unit tab tablet Take 5,000 Units by mouth daily.  ascorbic acid, vitamin C, (VITAMIN C) 500 mg tablet Take 1,000 mg by mouth daily.  docusate sodium (COLACE) 100 mg capsule Take 1 Cap by mouth two (2) times a day. (Patient taking differently: Take 100 mg by mouth two (2) times daily as needed.) 30 Cap 0    secukinumab (COSENTYX PEN) 150 mg/mL pnij by SubCUTAneous route every thirty (30) days.  atorvastatin (LIPITOR) 20 mg tablet Take 1 Tab by mouth nightly. 30 Tab 6    aspirin delayed-release 81 mg tablet Take  by mouth daily.  doxazosin (CARDURA) 4 mg tablet Take 4 mg by mouth nightly. Indications: BENIGN PROSTATIC HYPERTROPHY      oxybutynin chloride XL 10 mg CR tablet Take 10 mg by mouth nightly.  finasteride (PROSCAR) 5 mg tablet Take 5 mg by mouth nightly.  folic acid (FOLVITE) 1 mg tablet Take 1 mg by mouth daily.  methotrexate (RHEUMATREX) 2.5 mg tablet Take 15 mg by mouth every Sunday.        Social History     Socioeconomic History    Marital status:      Spouse name: Not on file    Number of children: Not on file    Years of education: Not on file    Highest education level: Not on file   Tobacco Use    Smoking status: Former Smoker     Packs/day: 0.25 Years: 2.00     Pack years: 0.50     Quit date: 1972     Years since quittin.1    Smokeless tobacco: Never Used   Substance and Sexual Activity    Alcohol use: No    Drug use: No     Family History   Problem Relation Age of Onset    Bleeding Prob Mother     No Known Problems Father     Cancer Sister         rectal    Heart Disease Brother     Hypertension Brother        Review of Systems:  GEN: no weight loss, weight gain, fatigue or night sweats  CV: no PND, orthopnea, or palpitations  Resp: no dyspnea on exertion, no cough  Abd: no nausea, vomiting or diarrhea  EXT: denies edema, claudication  Endocrine: no hair loss, excessive thirst or polyuria  Neurological ROS: no TIA or stroke symptoms  ROS otherwise negative      Objective:     Visit Vitals  /78 (BP 1 Location: Right arm, BP Patient Position: Sitting, BP Cuff Size: Adult)   Pulse 76   Temp 98.4 °F (36.9 °C) (Oral)   Resp 20   Ht 6' (1.829 m)   Wt 180 lb 12.8 oz (82 kg)   SpO2 98%   BMI 24.52 kg/m²     Body mass index is 24.52 kg/m². General:   alert, cooperative and no distress   Eyes: conjunctivae/sclerae clear. PERRL, EOM's intact   Mouth:  No oral lesions, no pharyngeal erythema, no exudates   Neck: Trachea midline, no thyromegaly, no bruits   Heart: S1 and S2 normal,no murmurs noted    Lungs: Clear to auscultation bilaterally, no increased work of breathing   Abdomen: Soft, nontender. Normal bowel sounds   Extremities: No edema or cyanosis   Neuro: ..alert, oriented x3,speech normal in context and clarity, cranial nerves II-XII intact,motor strength: full proximally and distally,gait: normal  reflexes: full and symmetric     Physical exam otherwise negative         Assessment/Plan:     Diagnoses and all orders for this visit:    Hypercholesterolemia    Coronary artery disease involving native coronary artery of native heart without angina pectoris    Chronic insomnia  -     zolpidem (AMBIEN) 10 mg tablet;  Take 1 Tab by mouth nightly. Max Daily Amount: 10 mg., Normal, Disp-90 Tab, R-1Not to exceed 3 additional fills before 03/07/2021    Psoriatic arthritis ([address]        Other instructions: The patient's medications were reviewed and reconciled. No change in his current medical regimen will be made. A no added salt, prudent diet is encouraged    Patient has recently received a Covid19 vaccine. Continue to follow-up with rheumatology. He is having lab work done every 3 months through them. PDMP is reviewed today    Prescription for and 90 days of Ambien with 1 refill given    As previously noted patient does have a prescription for Narcan that has been given to him in the past    Follow-up 6 months    Follow-up and Dispositions    · Return in about 6 months (around 9/16/2021). Milton Arauz MD    Please note that this dictation was completed with [employer], the computer voice recognition software. Quite often unanticipated grammatical, syntax, homophones, and other interpretive errors are inadvertently transcribed by the computer software. Please disregard these errors. Please excuse any errors that have escaped final proofreading.

## 2021-03-16 NOTE — PATIENT INSTRUCTIONS
DASH Diet: Care Instructions  Your Care Instructions     The DASH diet is an eating plan that can help lower your blood pressure. DASH stands for Dietary Approaches to Stop Hypertension. Hypertension is high blood pressure. The DASH diet focuses on eating foods that are high in calcium, potassium, and magnesium. These nutrients can lower blood pressure. The foods that are highest in these nutrients are fruits, vegetables, low-fat dairy products, nuts, seeds, and legumes. But taking calcium, potassium, and magnesium supplements instead of eating foods that are high in those nutrients does not have the same effect. The DASH diet also includes whole grains, fish, and poultry. The DASH diet is one of several lifestyle changes your doctor may recommend to lower your high blood pressure. Your doctor may also want you to decrease the amount of sodium in your diet. Lowering sodium while following the DASH diet can lower blood pressure even further than just the DASH diet alone. Follow-up care is a key part of your treatment and safety. Be sure to make and go to all appointments, and call your doctor if you are having problems. It's also a good idea to know your test results and keep a list of the medicines you take. How can you care for yourself at home? Following the DASH diet  · Eat 4 to 5 servings of fruit each day. A serving is 1 medium-sized piece of fruit, ½ cup chopped or canned fruit, 1/4 cup dried fruit, or 4 ounces (½ cup) of fruit juice. Choose fruit more often than fruit juice. · Eat 4 to 5 servings of vegetables each day. A serving is 1 cup of lettuce or raw leafy vegetables, ½ cup of chopped or cooked vegetables, or 4 ounces (½ cup) of vegetable juice. Choose vegetables more often than vegetable juice. · Get 2 to 3 servings of low-fat and fat-free dairy each day. A serving is 8 ounces of milk, 1 cup of yogurt, or 1 ½ ounces of cheese. · Eat 6 to 8 servings of grains each day.  A serving is 1 slice of bread, 1 ounce of dry cereal, or ½ cup of cooked rice, pasta, or cooked cereal. Try to choose whole-grain products as much as possible. · Limit lean meat, poultry, and fish to 2 servings each day. A serving is 3 ounces, about the size of a deck of cards. · Eat 4 to 5 servings of nuts, seeds, and legumes (cooked dried beans, lentils, and split peas) each week. A serving is 1/3 cup of nuts, 2 tablespoons of seeds, or ½ cup of cooked beans or peas. · Limit fats and oils to 2 to 3 servings each day. A serving is 1 teaspoon of vegetable oil or 2 tablespoons of salad dressing. · Limit sweets and added sugars to 5 servings or less a week. A serving is 1 tablespoon jelly or jam, ½ cup sorbet, or 1 cup of lemonade. · Eat less than 2,300 milligrams (mg) of sodium a day. If you limit your sodium to 1,500 mg a day, you can lower your blood pressure even more. Tips for success  · Start small. Do not try to make dramatic changes to your diet all at once. You might feel that you are missing out on your favorite foods and then be more likely to not follow the plan. Make small changes, and stick with them. Once those changes become habit, add a few more changes. · Try some of the following:  ? Make it a goal to eat a fruit or vegetable at every meal and at snacks. This will make it easy to get the recommended amount of fruits and vegetables each day. ? Try yogurt topped with fruit and nuts for a snack or healthy dessert. ? Add lettuce, tomato, cucumber, and onion to sandwiches. ? Combine a ready-made pizza crust with low-fat mozzarella cheese and lots of vegetable toppings. Try using tomatoes, squash, spinach, broccoli, carrots, cauliflower, and onions. ? Have a variety of cut-up vegetables with a low-fat dip as an appetizer instead of chips and dip. ? Sprinkle sunflower seeds or chopped almonds over salads. Or try adding chopped walnuts or almonds to cooked vegetables.   ? Try some vegetarian meals using beans and peas. Add garbanzo or kidney beans to salads. Make burritos and tacos with mashed henley beans or black beans. Where can you learn more? Go to http://www.costa.com/  Enter H967 in the search box to learn more about \"DASH Diet: Care Instructions. \"  Current as of: December 16, 2019               Content Version: 12.6  © 4162-4048 [employer]. Care instructions adapted under license by [employer] (which disclaims liability or warranty for this information). If you have questions about a medical condition or this instruction, always ask your healthcare professional. [address] any warranty or liability for your use of this information.

## 2021-03-16 NOTE — PROGRESS NOTES
Junie Wiggins is a 66 y.o. male presenting for Follow Up Chronic Condition (6 mo fu)  . 1. Have you been to the ER, urgent care clinic since your last visit? Hospitalized since your last visit? No    2. Have you seen or consulted any other health care providers outside of the [address] [place] [address] since your last visit? Include any pap smears or colon screening. Dr Greg Gao, Dr Anival Ace, last 12 mths 3/16/2021   Able to walk? Yes   Fall in past 12 months? 0   Do you feel unsteady? 0   Are you worried about falling 0         Abuse Screening Questionnaire 3/16/2021   Do you ever feel afraid of your partner? N   Are you in a relationship with someone who physically or mentally threatens you? N   Is it safe for you to go home? Y       3 most recent PHQ Screens 3/16/2021   Little interest or pleasure in doing things Not at all   Feeling down, depressed, irritable, or hopeless Not at all   Total Score PHQ 2 0       There are no discontinued medications.

## 2021-09-20 ENCOUNTER — OFFICE VISIT (OUTPATIENT)
Dept: INTERNAL MEDICINE CLINIC | Age: 71
End: 2021-09-20
Payer: MEDICARE

## 2021-09-20 VITALS
BODY MASS INDEX: 23.81 KG/M2 | HEART RATE: 72 BPM | DIASTOLIC BLOOD PRESSURE: 68 MMHG | OXYGEN SATURATION: 98 % | HEIGHT: 72 IN | TEMPERATURE: 98 F | WEIGHT: 175.8 LBS | SYSTOLIC BLOOD PRESSURE: 126 MMHG | RESPIRATION RATE: 20 BRPM

## 2021-09-20 DIAGNOSIS — N40.1 BPH WITH OBSTRUCTION/LOWER URINARY TRACT SYMPTOMS: ICD-10-CM

## 2021-09-20 DIAGNOSIS — N13.8 BPH WITH OBSTRUCTION/LOWER URINARY TRACT SYMPTOMS: ICD-10-CM

## 2021-09-20 DIAGNOSIS — Z79.899 IMMUNOSUPPRESSION DUE TO DRUG THERAPY (HCC): ICD-10-CM

## 2021-09-20 DIAGNOSIS — D84.821 IMMUNOSUPPRESSION DUE TO DRUG THERAPY (HCC): ICD-10-CM

## 2021-09-20 DIAGNOSIS — L40.50 PSORIATIC ARTHRITIS (HCC): ICD-10-CM

## 2021-09-20 DIAGNOSIS — E78.00 HYPERCHOLESTEROLEMIA: Primary | ICD-10-CM

## 2021-09-20 DIAGNOSIS — I25.10 CORONARY ARTERY DISEASE INVOLVING NATIVE CORONARY ARTERY OF NATIVE HEART WITHOUT ANGINA PECTORIS: ICD-10-CM

## 2021-09-20 DIAGNOSIS — F51.04 CHRONIC INSOMNIA: ICD-10-CM

## 2021-09-20 PROCEDURE — 3017F COLORECTAL CA SCREEN DOC REV: CPT | Performed by: INTERNAL MEDICINE

## 2021-09-20 PROCEDURE — G8432 DEP SCR NOT DOC, RNG: HCPCS | Performed by: INTERNAL MEDICINE

## 2021-09-20 PROCEDURE — G8536 NO DOC ELDER MAL SCRN: HCPCS | Performed by: INTERNAL MEDICINE

## 2021-09-20 PROCEDURE — 99214 OFFICE O/P EST MOD 30 MIN: CPT | Performed by: INTERNAL MEDICINE

## 2021-09-20 PROCEDURE — G8420 CALC BMI NORM PARAMETERS: HCPCS | Performed by: INTERNAL MEDICINE

## 2021-09-20 PROCEDURE — 1101F PT FALLS ASSESS-DOCD LE1/YR: CPT | Performed by: INTERNAL MEDICINE

## 2021-09-20 PROCEDURE — G8427 DOCREV CUR MEDS BY ELIG CLIN: HCPCS | Performed by: INTERNAL MEDICINE

## 2021-09-20 RX ORDER — OXYCODONE AND ACETAMINOPHEN 7.5; 325 MG/1; MG/1
TABLET ORAL
COMMUNITY

## 2021-09-20 RX ORDER — NALOXONE HYDROCHLORIDE 4 MG/.1ML
SPRAY NASAL
Qty: 2 EACH | Refills: 0 | Status: SHIPPED | OUTPATIENT
Start: 2021-09-20

## 2021-09-20 NOTE — PROGRESS NOTES
Subjective:     Mr. Ramiro Simms returns to the office today in follow-up of multiple medical problems. The patient has hypercholesterolemia currently on statin therapy. He tolerates this without muscle soreness or GI upset. He does have a history of coronary artery disease with previous stents and remains on daily aspirin. He denies any exertional chest pains or claudication. BPH is currently managed on Cardura and Proscar. He has had elevated PSAs in the past and has been seen by urology. He notes improvement of his urinary flow issues with the combination of medications that he is taking. The patient does have chronic insomnia. He is chronically on Ambien for this. He continues to allow him to sleep at night and feel refreshed in the morning. He is able to do ADLs as result of getting rest.  He denies any hangover effect. The patient is followed by rheumatology for psoriatic arthritis. He remains immunosuppressed with the use of Cosentyx and Rheumatrex. He does take Folvite supplement. His rheumatologist is giving him oxycodone to be taken 4 times a day as needed for pain. He does not have a prescription for Narcan. He has not received a third Covid booster. Past Medical History:   Diagnosis Date    BPH with obstruction/lower urinary tract symptoms 3/23/2018    Chronic insomnia 3/23/2018    Coronary artery disease involving native coronary artery 3/23/2018    Status post stents ×3    Hypercholesterolemia 3/23/2018    Immunosuppression due to drug therapy ([facility] [address] 4/10/2019    Primary open angle glaucoma of right eye 3/23/2018    PSA elevation 3/23/2018    Status post biopsy.   Atypical small acinar proliferation    Psoriatic arthritis [facility])      Past Surgical History:   Procedure Laterality Date    COLONOSCOPY N/A 12/13/2018    COLONOSCOPY performed by Hemal Caballero MD at [facility] ENDOSCOPY    HX HEART CATHETERIZATION  05/2015    HX HERNIA REPAIR  05/29/2019    Open repair of right inguinal hernia with mesh     HX KNEE ARTHROSCOPY Bilateral     HX ORTHOPAEDIC Right     Cubital tunnel sx    IR INJ FORAMIN EPID LUMB ANES/STER ADDL  4/23/2019    IR INJ FORAMIN EPID LUMB ANES/STER SNGL  4/23/2019     No Known Allergies  Current Outpatient Medications   Medication Sig Dispense Refill    oxyCODONE-acetaminophen (PERCOCET 7.5) 7.5-325 mg per tablet Take  by mouth every six (6) hours as needed for Pain.  naloxone (NARCAN) 4 mg/actuation nasal spray Use 1 spray intranasally, then discard. Repeat with new spray every 2 min as needed for opioid overdose symptoms, alternating nostrils. 2 Each 0    zolpidem (AMBIEN) 10 mg tablet Take 1 Tab by mouth nightly. Max Daily Amount: 10 mg. 90 Tab 1    latanoprost (XALATAN) 0.005 % ophthalmic solution       ibuprofen (MOTRIN) 800 mg tablet Take 1 Tab by mouth three (3) times daily (with meals). 90 Tab 0    cholecalciferol, VITAMIN D3, (VITAMIN D3) 5,000 unit tab tablet Take 5,000 Units by mouth daily.  ascorbic acid, vitamin C, (VITAMIN C) 500 mg tablet Take 1,000 mg by mouth daily.  docusate sodium (COLACE) 100 mg capsule Take 1 Cap by mouth two (2) times a day. (Patient taking differently: Take 100 mg by mouth two (2) times daily as needed.) 30 Cap 0    secukinumab (COSENTYX PEN) 150 mg/mL pnij by SubCUTAneous route every thirty (30) days.  atorvastatin (LIPITOR) 20 mg tablet Take 1 Tab by mouth nightly. 30 Tab 6    aspirin delayed-release 81 mg tablet Take  by mouth daily.  doxazosin (CARDURA) 4 mg tablet Take 4 mg by mouth nightly. Indications: BENIGN PROSTATIC HYPERTROPHY      oxybutynin chloride XL 10 mg CR tablet Take 10 mg by mouth nightly.  finasteride (PROSCAR) 5 mg tablet Take 5 mg by mouth nightly.  folic acid (FOLVITE) 1 mg tablet Take 1 mg by mouth daily.  methotrexate (RHEUMATREX) 2.5 mg tablet Take 15 mg by mouth every Sunday.        Social History     Socioeconomic History    Marital status:      Spouse name: Not on file    Number of children: Not on file    Years of education: Not on file    Highest education level: Not on file   Tobacco Use    Smoking status: Former Smoker     Packs/day: 0.25     Years: 2.00     Pack years: 0.50     Quit date: 1972     Years since quittin.7    Smokeless tobacco: Never Used   Vaping Use    Vaping Use: Never used   Substance and Sexual Activity    Alcohol use: No    Drug use: No     Social Determinants of Health     Financial Resource Strain:     Difficulty of Paying Living Expenses:    Food Insecurity:     Worried About Running Out of Food in the Last Year:     [address] in the Last Year:    Transportation Needs:     Lack of Transportation (Medical):      Lack of Transportation (Non-Medical):    Physical Activity:     Days of Exercise per Week:     Minutes of Exercise per Session:    Stress:     Feeling of Stress :    Social Connections:     Frequency of Communication with Friends and Family:     Frequency of Social Gatherings with Friends and Family:     Attends Synagogue Services:     Active Member of Clubs or Organizations:     Attends Club or Organization Meetings:     Marital Status:      Family History   Problem Relation Age of Onset    Bleeding Prob Mother     No Known Problems Father     Cancer Sister         rectal    Heart Disease Brother     Hypertension Brother        Review of Systems:  GEN: no weight loss, weight gain, fatigue or night sweats  CV: no PND, orthopnea, or palpitations  Resp: no dyspnea on exertion, no cough  Abd: no nausea, vomiting or diarrhea  EXT: Positive for chronic joint pain  Endocrine: no hair loss, excessive thirst or polyuria  Neurological ROS: no TIA or stroke symptoms  ROS otherwise negative      Objective:     Visit Vitals  /68 (BP 1 Location: Right upper arm, BP Patient Position: Sitting, BP Cuff Size: Adult)   Pulse 72   Temp 98 °F (36.7 °C) (Oral)   Resp 20   Ht 6' (1.829 m)   Wt 175 lb 12.8 oz (79.7 kg)   SpO2 98%   BMI 23.84 kg/m²     Body mass index is 23.84 kg/m². General:   alert, cooperative and no distress   Eyes: conjunctivae/sclerae clear. PERRL, EOM's intact   Mouth:  No oral lesions, no pharyngeal erythema, no exudates   Neck: Trachea midline, no thyromegaly, no bruits   Heart: S1 and S2 normal,no murmurs noted    Lungs: Clear to auscultation bilaterally, no increased work of breathing   Abdomen: Soft, nontender. Normal bowel sounds   Extremities: No edema or cyanosis   Neuro: ..alert, oriented x3,speech normal in context and clarity, cranial nerves II-XII intact,motor strength: full proximally and distally,gait: normal  reflexes: full and symmetric     Physical exam otherwise negative         Assessment/Plan:     Diagnoses and all orders for this visit:    Hypercholesterolemia    Coronary artery disease involving native coronary artery of native heart without angina pectoris    Chronic insomnia  -     naloxone (NARCAN) 4 mg/actuation nasal spray; Use 1 spray intranasally, then discard. Repeat with new spray every 2 min as needed for opioid overdose symptoms, alternating nostrils. , Normal, Disp-2 Each, R-0    BPH with obstruction/lower urinary tract symptoms    Psoriatic arthritis ([address]    Immunosuppression due to drug therapy ([address]        Other instructions: The patient's medications were reviewed and reconciled. No change in his current medical regimen will be made. A prudent diet and exercise is encouraged    Controlled substance contract is endorsed today. PDMP is reviewed confirming that he is taking both an opiate and sleeping medication. Have prescribed Narcan to have available in case of overdose. Patient currently having labs done at rheumatologist office. Patient counseled to have third Covid vaccination due to treatment with immunosuppressive medication.     Follow-up in 6 months    Follow-up and Dispositions    · Return in about 6 months (around 3/20/2022). Carrol Jimenez MD    Please note that this dictation was completed with [employer], the computer voice recognition software. Quite often unanticipated grammatical, syntax, homophones, and other interpretive errors are inadvertently transcribed by the computer software. Please disregard these errors. Please excuse any errors that have escaped final proofreading.

## 2021-09-20 NOTE — PROGRESS NOTES
Mitchell Baca is a 66 y.o. male presenting for Follow Up Chronic Condition (6 mo fu)  . 1. Have you been to the ER, urgent care clinic since your last visit? Hospitalized since your last visit? No    2. Have you seen or consulted any other health care providers outside of the [address] [place] [address] since your last visit? Include any pap smears or colon screening. Dr Thor Brown, last 12 mths 3/16/2021   Able to walk? Yes   Fall in past 12 months? 0   Do you feel unsteady? 0   Are you worried about falling 0         Abuse Screening Questionnaire 3/16/2021   Do you ever feel afraid of your partner? N   Are you in a relationship with someone who physically or mentally threatens you? N   Is it safe for you to go home? Y       3 most recent PHQ Screens 3/16/2021   Little interest or pleasure in doing things Not at all   Feeling down, depressed, irritable, or hopeless Not at all   Total Score PHQ 2 0       There are no discontinued medications.

## 2021-09-20 NOTE — LETTER
CONTROLLED SUBSTANCE MEDICATION AGREEMENT     Patient Name: Reeta Cooks  Patient YOB: 1950     I understand, that controlled substance medications may be used to help better manage my symptoms and to improve my ability to function at home, work and in social settings. However, I also understand that these medications do have risks, which have been discussed with me, including possible development of physical or psychological dependence. I understand that successful treatment requires mutual trust and honesty between me and my provider. I understand and agree that following this Medication Agreement is necessary in continuing my provider-patient relationship and the success of my treatment plan. Explanation from my Provider: Benefits and Goals of Controlled Substance Medications: There are two potential goals for your treatment: (1) decreased pain and suffering (2) improved daily life functions. There are many possible treatments for your chronic condition(s). Alternatives such as physical therapy, yoga, massage, home daily exercise, meditation, relaxation techniques, injections, chiropractic manipulations, surgery, cognitive therapy, hypnosis and many medications that are not habit-forming may be used. Use of controlled substance medications may be helpful, but they are unlikely to resolve all symptoms or restore all function. Explanation from my Provider: Risks of Controlled Substance Medications:  Opioid pain medications: These medications can lead to problems such as addiction/dependence, sedation, lightheadedness/dizziness, memory issues, falls, constipation, nausea, or vomiting. They may also impair the ability to drive or operate machinery. Additionally, these medications may lower testosterone levels, leading to loss of bone strength, stamina and sex drive.   They may cause problems with breathing, sleep apnea and reduced coughing, which is especially dangerous for patients with lung disease. Overdose or dangerous interactions with alcohol and other medications may occur, leading to death. Hyperalgesia may develop, which means patients receiving opioids for the treatment of pain may become more sensitive to certain painful stimuli, and in some cases, experience pain from ordinarily non-painful stimuli. Women between the ages of 14-53 who could become pregnant should carefully weigh the risks and benefits of opioids with their physicians, as these medications increase the risk of pregnancy complications, including miscarriage,  delivery and stillbirth. It is also possible for babies to be born addicted to opioids. Opioid dependence withdrawal symptoms may include; feelings of uneasiness, increased pain, irritability, belly pain, diarrhea, sweats and goose-flesh. Benzodiazepines and non-benzodiazepine sleep medications: These medications can lead to problems such as addiction/dependence, sedation, fatigue, lightheadedness, dizziness, incoordination, falls, depression, hallucinations, and impaired judgment, memory and concentration. The ability to drive and operate machinery may also be affected. Abnormal sleep-related behaviors have been reported, including sleepwalking, driving, making telephone calls, eating, or having sex while not fully awake. These medications can suppress breathing and worsen sleep apnea, particularly when combined with alcohol or other sedating medications, potentially leading to death. Dependence withdrawal symptoms may include tremors, anxiety, hallucinations and seizures. Initials:_______  Stimulants:  Common adverse effects include addiction/dependence, increased blood  pressure and heart rate, decreased appetite, nausea, involuntary weight loss, insomnia,  irritability, and headaches.   These risks may increase when these medications are combined with other stimulants, such as caffeine pills or energy drinks, certain weight loss supplements and oral decongestants. Dependence withdrawal symptoms may include depressed mood, loss of interest, suicidal thoughts, anxiety, fatigue, appetite changes and agitation. Testosterone replacement therapy:  Potential side effects include increased risk of stroke and heart attack, blood clots, increased blood pressure, increased cholesterol, enlarged prostate, sleep apnea, irritability/aggression and other mood disorders, and decreased fertility. I agree and understand that I and my prescriber have the following rights and responsibilities regarding my treatment plan:     1. MY RIGHTS:  To be informed of my treatment and medication plan. To be an active participant in my health and wellbeing. 2. MY RESPONSIBILITY AND UNDERSTANDING FOR USE OF MEDICATIONS   I will take medications at the dose and frequency as directed. For my safety, I will not increase or change how I take my medications without the recommendation of my healthcare provider.  I will actively participate in any program recommended by my provider which may improve function, including social, physical, psychological programs.  I will not take my medications with alcohol or other drugs not prescribed to me. I understand that drinking alcohol with my medications increases the chances of side effects, including reduced breathing rate and could lead to personal injury when operating machinery.  I understand that if I have a history of substance use disorders, including alcohol or other illicit drugs, that I may be at increased risk of addiction to my medications.  I agree to notify my provider immediately if I should become pregnant so that my treatment plan can be adjusted.    I agree and understand that I shall only receive controlled substance medications from the prescriber that signed this agreement unless there is written agreement among other prescribers of controlled substances outlining the responsibility of the medications being prescribed.  I understand that the if the controlled medication is not helping to achieve goals, the dosage may be tapered and no longer prescribed. 3. MY RESPONSIBILITY FOR COMMUNICATION / PRESCRIPTION RENEWALS   I agree that all controlled substance medications that I take will be prescribed only by my provider. If another healthcare provider prescribes me medication in an emergency, I will notify my provider within seventy-two (72) hours.  I will arrange for refills at the prescribed interval ONLY during regular office hours. I will not ask for refills earlier than agreed, after-hours, on holidays or weekends. Refills may take up to 72 hours for processing and prescriptions to reach the pharmacy.  I will inform my other health care providers that I am taking these medications and of the existence of this [id number]. In the event of an emergency, I will provide the same information to the emergency department prescribers. Initials:_______  Armida Brunner I will keep my provider updated on the pharmacy I am using for controlled medication prescription filling. 4. MY RESPONSIBILITY FOR PROTECTING MEDICATIONS   I will protect my prescriptions and medications. I understand that lost or misplaced prescriptions will not be replaced.  I will keep medications only for my own use and will not share them with others. I will keep all medications away from children.  I agree that if my medications are adjusted or discontinued, I will properly dispose of any remaining medications. I understand that I will be required to dispose of any remaining controlled medications as, directed by my prescriber, prior to being provided with any prescriptions for other controlled medications. Medication drop box locations can be found at: [employer] [place]    5.  MY RESPONSIBILITY WITH ILLEGAL DRUGS    I will not use illegal or street drugs or another person's prescription medications not prescribed to me.  If there are identified addiction type symptoms, then referral to a program may be provided by my provider and I agree to follow through with this recommendation. 6. MY RESPONSIBILITY FOR COOPERATION WITH INVESTIGATIONS   I understand that my provider will comply with any applicable law and may discuss my use and/or possible misuse/abuse of controlled substances and alcohol, as appropriate, with any health care provider involved in my care, pharmacist, or legal authority.  I authorize my provider and pharmacy to cooperate fully with law enforcement agencies (as permitted by law) in the investigation of any possible misuse, sale, or other diversion of my controlled substances.  I agree to waive any applicable privilege or right of privacy or confidentiality with respect to these authorizations. 7. PROVIDERS RIGHT TO MONITOR FOR SAFETY: PRESCRIPTION MONITORING / DRUG TESTING   I consent to drug/toxicology screening and will submit to a drug screen upon my providers request to assure I am only taking the prescribed drugs for my safety monitoring. I understand that a drug screen is a laboratory test in which a sample of my urine, blood or saliva is checked to see what drugs I have been taking. This may entail an observed urine specimen, which means that a nurse or other health care provider may watch me provide urine, and I will cooperate if I am asked to provide an observed specimen.  I understand that my provider will check a copy of my State Prescription Monitoring Program () Report in order to safely prescribe medications.  Pill Counts: I consent to pill counts when requested. I may be asked to bring all my prescribed controlled substance medications, in their original bottles, to all of my scheduled appointments. In addition, my provider may ask me to come to the practice at any time for a random pill count. Initials:_______    8. TERMINATION OF THIS AGREEMENT  For my safety, my prescriber has the right to stop prescribing controlled substance medications and may end this agreement.  Conditions that may result in termination of this agreement:  a. I do not show any improvement in pain, or my activity has not improved. b. I develop rapid tolerance or loss of improvement, as described in my treatment plan.  c. I develop significant side effects from the medication. d. My behavior is not consistent with the responsibilities outlined above, thereby causing safety concerns to continue prescribing controlled substance medications. e. I fail to follow the terms of this agreement. f. Other:____________________________       UNDERSTANDING THIS MEDICATION AGREEMENT:    I have read the above and have had all my questions answered. For chronic disease management, I know that my symptoms can be managed with many types of treatments. A chronic medication trial may be part of my treatment, but I must be an active participant in my care. Medication therapy is only one part of my symptom management plan. In some cases, there may be limited scientific evidence to support the chronic use of certain medications to improve symptoms and daily function. Furthermore, in certain circumstances, there may be scientific information that suggests that the use of chronic controlled substances may worsen my symptoms and increase my risk of unintentional death directly related to this medication therapy. I know that if my provider feels my risk from controlled medications is greater than my benefit, I will have my controlled substance medication(s) compassionately lowered or removed altogether. I further agree to allow this office to contact my HIPAA contact if there are concerns about my safety and use of the controlled medications.    I have agreed to use the prescribed controlled substance medications to me as instructed by my provider and as stated in this Medication Agreement. My initial on each page and my signature below shows that I have read each page and I have had the opportunity to ask questions with answers provided by my provider.     Patient Name (Printed): _____________________________________  Patient Signature:  ______________________   Date: _____________    Prescriber Name (Printed): ___________________________________  Prescriber Signature: _____________________  Date: _____________

## 2021-10-29 ENCOUNTER — OFFICE VISIT (OUTPATIENT)
Dept: SURGERY | Age: 71
End: 2021-10-29
Payer: MEDICARE

## 2021-10-29 VITALS
BODY MASS INDEX: 23.7 KG/M2 | HEART RATE: 63 BPM | SYSTOLIC BLOOD PRESSURE: 154 MMHG | OXYGEN SATURATION: 99 % | DIASTOLIC BLOOD PRESSURE: 71 MMHG | WEIGHT: 175 LBS | TEMPERATURE: 97.5 F | RESPIRATION RATE: 20 BRPM | HEIGHT: 72 IN

## 2021-10-29 DIAGNOSIS — R10.31 GROIN PAIN, RIGHT: Primary | ICD-10-CM

## 2021-10-29 PROCEDURE — 99024 POSTOP FOLLOW-UP VISIT: CPT | Performed by: SURGERY

## 2021-10-29 NOTE — PROGRESS NOTES
Identified pt with two pt identifiers(name and ). Reviewed record in preparation for visit and have obtained necessary documentation. All patient medications has been reviewed. Chief Complaint   Patient presents with    Follow-up     S/P open repair right inguinal hernia repair with mesh 19       Health Maintenance Due   Topic    Lipid Screen     DTaP/Tdap/Td series (1 - Tdap)    Shingrix Vaccine Age 50> (1 of 2)    Pneumococcal 65+ yrs at Risk Vaccine (1 of 2 - PCV13)    Medicare Yearly Exam     COVID-19 Vaccine (3 - Moderna risk 3-dose series)    Flu Vaccine (1)       Vitals:    10/29/21 0957   BP: (!) 154/71   Pulse: 63   Resp: 20   Temp: 97.5 °F (36.4 °C)   SpO2: 99%   Weight: 79.4 kg (175 lb)   Height: 6' (1.829 m)   PainSc:   0 - No pain       4. Have you been to the ER, urgent care clinic since your last visit? Hospitalized since your last visit? No    5. Have you seen or consulted any other health care providers outside of the [address] [place] [address] since your last visit? Include any pap smears or colon screening. No      Patient is accompanied by self I have received verbal consent from Giacomo Awad to discuss any/all medical information while they are present in the room.

## 2021-10-29 NOTE — Clinical Note
10/29/2021    Patient: Kamlesh Giraldo   YOB: 1950   Date of Visit: 10/29/2021     Jordan Meza, [address] [place] [id number]  Via In [address] [place] [address] Drive, A[place]ce [place] [address]  Via In [place]    Dear MD Consuelo Severino MD,      Thank you for referring Mr. Nguyen Ahuja to [address] [place] [address] for evaluation. My notes for this consultation are attached. If you have questions, please do not hesitate to call me. I look forward to following your patient along with you.       Sincerely,    Braydon Hansen MD

## 2021-10-29 NOTE — PROGRESS NOTES
To: Ainsley Griffith MD, Rukhsana Barnes MD    From: Arina Xiong MD    Thank you. Encounter Date: 10/29/2021    Subjective:      Leobardo Panda is a 66 y.o. male comes in today with recent discomfort in his right groin. I fixed his right inguinal hernia with an open repair and mesh in 2019. He says that Friday he was watching TV and all of a sudden noticed some discomfort in his lower abdomen. He said most of it seemed to go away but now he has a little discomfort on the right cord and testicle. He has no memory of any injury. He is not having any changes in terms of bowel movements or urination. He has not noticed any bulge. Objective:     Visit Vitals  BP (!) 154/71 (BP 1 Location: Right upper arm, BP Patient Position: Sitting, BP Cuff Size: Adult)   Pulse 63   Temp 97.5 °F (36.4 °C)   Resp 20   Ht 6' (1.829 m)   Wt 79.4 kg (175 lb)   SpO2 99%   BMI 23.73 kg/m²       General:  alert, cooperative, no distress, appears stated age   Abdomen: soft, bowel sounds active, non-tender    No evidence of recurrent hernia. The cord and testicle are not tender to palpation. Assessment:     New groin discomfort 2 years after inguinal hernia repair with mesh. There is no evidence of recurrence. The distribution of the discomfort follows the genital branch of the genitofemoral nerve. It is likely referred pain since the cord and testicle themselves are not tender to palpation. I explained to him that sometimes, even this far out from hernia surgery, nerves can be irritated by the mesh or scar tissue. Plan: We will try a week of ice and around-the-clock ibuprofen. He will refrain from strenuous exercise and heavy lifting. This will likely take care of the symptoms. If not he will give us a call and we could do an injection at the more proximal aspect of the genitofemoral nerve.     Arina Xiong MD

## 2021-11-05 DIAGNOSIS — F51.04 CHRONIC INSOMNIA: ICD-10-CM

## 2021-11-05 NOTE — TELEPHONE ENCOUNTER
Requested Prescriptions     Pending Prescriptions Disp Refills    zolpidem (AMBIEN) 10 mg tablet 90 Tablet 1     Sig: Take 1 Tablet by mouth nightly. Max Daily Amount: 10 mg.        Last Refill: 3/16/21  Next Appointment:3/22/22

## 2021-11-06 RX ORDER — ZOLPIDEM TARTRATE 10 MG/1
10 TABLET ORAL
Qty: 90 TABLET | Refills: 1 | Status: SHIPPED | OUTPATIENT
Start: 2021-11-06 | End: 2022-09-23

## 2021-11-08 ENCOUNTER — TELEPHONE (OUTPATIENT)
Dept: SURGERY | Age: 71
End: 2021-11-08

## 2021-11-08 NOTE — TELEPHONE ENCOUNTER
PT SEEN ON 10/29/21 FOR GROIN PAIN. PT WAS TOLD THAT IF HE STILL HAD PAIN TO LET US KNOW, HE COULD GET AN INJECTION. PT WOULD LIKE TO DISCUSS THE INJECTION.  PLEASE CALL PT.

## 2021-11-12 ENCOUNTER — OFFICE VISIT (OUTPATIENT)
Dept: SURGERY | Age: 71
End: 2021-11-12
Payer: MEDICARE

## 2021-11-12 VITALS
TEMPERATURE: 96.5 F | RESPIRATION RATE: 20 BRPM | WEIGHT: 175 LBS | OXYGEN SATURATION: 100 % | DIASTOLIC BLOOD PRESSURE: 84 MMHG | HEIGHT: 72 IN | BODY MASS INDEX: 23.7 KG/M2 | HEART RATE: 78 BPM | SYSTOLIC BLOOD PRESSURE: 151 MMHG

## 2021-11-12 DIAGNOSIS — R10.31 GROIN PAIN, RIGHT: Primary | ICD-10-CM

## 2021-11-12 PROCEDURE — 76942 ECHO GUIDE FOR BIOPSY: CPT | Performed by: SURGERY

## 2021-11-12 PROCEDURE — 20552 NJX 1/MLT TRIGGER POINT 1/2: CPT | Performed by: SURGERY

## 2021-11-12 NOTE — PROGRESS NOTES
Fine Needle Aspiration    Encounter Date: 11/12/2021    Preoperative diagnosis: Right groin pain 2 years status post open right inguinal hernia repair with mesh. No evidence of recurrence. Postoperative diagnosis: same  Procedure: ultrasound-guided injection of the region of the genitofemoral nerve and another at the right pubic tubercle. Description of the procedure: After obtaining informed consent a time out was performed any the patient was taken to the procedure room and ultrasound was used to guide injection of a mixture of 5 cc local anesthetic and 1 cc Kenalog (see orders for specifics) just medial to the anterior superior iliac spine. Injection was done in all of the abdominal wall layers using ultrasound guidance. The same set up was used and injected at the pubic tubercle also under ultrasound guidance. He had some instant relief but not complete relief. Band-Aids were placed over the sites. Disposition: We will see if his symptoms resolve over the weekend. If not I will ask Dr. Earline Hernandez to consider doing a cord block.       Rachel Nayak MD

## 2021-11-12 NOTE — PROGRESS NOTES
Identified pt with two pt identifiers(name and ). Reviewed record in preparation for visit and have obtained necessary documentation. All patient medications has been reviewed. Chief Complaint   Patient presents with    Follow-up     S/P Open repair of right inguinal hernia with mesh 21       Health Maintenance Due   Topic    Lipid Screen     DTaP/Tdap/Td series (1 - Tdap)    Shingrix Vaccine Age 50> (1 of 2)    Pneumococcal 65+ yrs at Risk Vaccine (1 of 2 - PCV13)    Medicare Yearly Exam     COVID-19 Vaccine (3 - Moderna risk 4-dose series)    Flu Vaccine (1)       Vitals:    21 1040   BP: (!) 151/84   Pulse: 78   Resp: 20   Temp: (!) 96.5 °F (35.8 °C)   SpO2: 100%   Weight: 79.4 kg (175 lb)   Height: 6' (1.829 m)   PainSc:   4   PainLoc: Groin       4. Have you been to the ER, urgent care clinic since your last visit? Hospitalized since your last visit? No    5. Have you seen or consulted any other health care providers outside of the [address] [place] [address] since your last visit? Include any pap smears or colon screening. No      Patient is accompanied by self I have received verbal consent from Claribel Martínez to discuss any/all medical information while they are present in the room.

## 2021-11-12 NOTE — Clinical Note
11/12/2021    Patient: Zayda Garcia   YOB: 1950   Date of Visit: 11/12/2021     Amber Ayala, [address] [facility] [address]  Via [place] [address] [place] [address] Drive, [place] [address]  Via In [place]    Dear Isela Neer, MD Bretta Sicard, MD,      Thank you for referring Mr. Laurel Holliday to [address] [place] [address] for evaluation. My notes for this consultation are attached. If you have questions, please do not hesitate to call me. I look forward to following your patient along with you.       Sincerely,    Annie Paredes MD

## 2021-11-19 ENCOUNTER — TELEPHONE (OUTPATIENT)
Dept: SURGERY | Age: 71
End: 2021-11-19

## 2021-11-24 ENCOUNTER — TELEPHONE (OUTPATIENT)
Dept: SURGERY | Age: 71
End: 2021-11-24

## 2021-11-24 NOTE — TELEPHONE ENCOUNTER
Pt called about the conversation he had with Dr. Antionette Elena yesterday. Pt stated Dr. Antionette Elena recommended a pain cream/ointment for him but pt can't remember the name.  Please call [phone]

## 2021-11-29 ENCOUNTER — TELEPHONE (OUTPATIENT)
Dept: SURGERY | Age: 71
End: 2021-11-29

## 2021-11-29 NOTE — TELEPHONE ENCOUNTER
Patient called and would like to speak with nurse regarding on going pain that he is having.     Please give a call back  BCB# [phone]

## 2021-12-02 ENCOUNTER — TELEPHONE (OUTPATIENT)
Dept: SURGERY | Age: 71
End: 2021-12-02

## 2021-12-02 NOTE — TELEPHONE ENCOUNTER
Patient called back regarding previous encounter regarding pain, he also said in the past couple months he has lost 8-10 pounds and is concerned.  Patient would like to be seen by urologist as soon as possible    Please give a call back as soon as possible  BCB# [phone]

## 2021-12-03 NOTE — TELEPHONE ENCOUNTER
Patient has appointment with Dr. Tracie Gitelman, urology 12/03/21 @1:50. Instructed patient to call for any concerns. Express understanding.

## 2021-12-07 ENCOUNTER — TELEPHONE (OUTPATIENT)
Dept: SURGERY | Age: 71
End: 2021-12-07

## 2021-12-07 NOTE — TELEPHONE ENCOUNTER
Spoke with Mr. Shannon Barnett to f/u on appt with urology. Saw Dr. Hetal Gomez today. Being treated for epididymitis. Told him to call us if that does not take care of it.

## 2022-03-18 PROBLEM — L40.50 PSORIATIC ARTHRITIS (HCC): Status: ACTIVE | Noted: 2018-03-23

## 2022-03-18 PROBLEM — K40.90 RIGHT INGUINAL HERNIA: Status: ACTIVE | Noted: 2019-05-29

## 2022-03-18 PROBLEM — F51.04 CHRONIC INSOMNIA: Status: ACTIVE | Noted: 2018-03-23

## 2022-03-19 PROBLEM — S23.9XXA THORACIC BACK SPRAIN: Status: ACTIVE | Noted: 2019-06-24

## 2022-03-19 PROBLEM — E78.00 HYPERCHOLESTEROLEMIA: Status: ACTIVE | Noted: 2018-03-23

## 2022-03-19 PROBLEM — N13.8 BPH WITH OBSTRUCTION/LOWER URINARY TRACT SYMPTOMS: Status: ACTIVE | Noted: 2018-03-23

## 2022-03-19 PROBLEM — H40.1110 PRIMARY OPEN ANGLE GLAUCOMA OF RIGHT EYE: Status: ACTIVE | Noted: 2018-03-23

## 2022-03-19 PROBLEM — R97.20 PSA ELEVATION: Status: ACTIVE | Noted: 2018-03-23

## 2022-03-19 PROBLEM — N40.1 BPH WITH OBSTRUCTION/LOWER URINARY TRACT SYMPTOMS: Status: ACTIVE | Noted: 2018-03-23

## 2022-03-19 PROBLEM — I25.10 CORONARY ARTERY DISEASE INVOLVING NATIVE CORONARY ARTERY: Status: ACTIVE | Noted: 2018-03-23

## 2022-03-20 PROBLEM — Z79.899 IMMUNOSUPPRESSION DUE TO DRUG THERAPY (HCC): Status: ACTIVE | Noted: 2019-04-10

## 2022-03-20 PROBLEM — D84.821 IMMUNOSUPPRESSION DUE TO DRUG THERAPY (HCC): Status: ACTIVE | Noted: 2019-04-10

## 2022-03-22 ENCOUNTER — OFFICE VISIT (OUTPATIENT)
Dept: INTERNAL MEDICINE CLINIC | Age: 72
End: 2022-03-22
Payer: MEDICARE

## 2022-03-22 VITALS
RESPIRATION RATE: 16 BRPM | SYSTOLIC BLOOD PRESSURE: 122 MMHG | OXYGEN SATURATION: 98 % | HEIGHT: 72 IN | WEIGHT: 178.8 LBS | BODY MASS INDEX: 24.22 KG/M2 | TEMPERATURE: 98 F | HEART RATE: 79 BPM | DIASTOLIC BLOOD PRESSURE: 70 MMHG

## 2022-03-22 DIAGNOSIS — L40.50 PSORIATIC ARTHRITIS (HCC): ICD-10-CM

## 2022-03-22 DIAGNOSIS — Z00.00 MEDICARE ANNUAL WELLNESS VISIT, SUBSEQUENT: Primary | ICD-10-CM

## 2022-03-22 DIAGNOSIS — E78.00 HYPERCHOLESTEROLEMIA: ICD-10-CM

## 2022-03-22 DIAGNOSIS — I25.10 CORONARY ARTERY DISEASE INVOLVING NATIVE CORONARY ARTERY OF NATIVE HEART WITHOUT ANGINA PECTORIS: ICD-10-CM

## 2022-03-22 DIAGNOSIS — F51.04 CHRONIC INSOMNIA: ICD-10-CM

## 2022-03-22 DIAGNOSIS — Z79.899 IMMUNOSUPPRESSION DUE TO DRUG THERAPY (HCC): ICD-10-CM

## 2022-03-22 DIAGNOSIS — N40.1 BPH WITH OBSTRUCTION/LOWER URINARY TRACT SYMPTOMS: ICD-10-CM

## 2022-03-22 DIAGNOSIS — N13.8 BPH WITH OBSTRUCTION/LOWER URINARY TRACT SYMPTOMS: ICD-10-CM

## 2022-03-22 DIAGNOSIS — D84.821 IMMUNOSUPPRESSION DUE TO DRUG THERAPY (HCC): ICD-10-CM

## 2022-03-22 DIAGNOSIS — S61.219A LACERATION OF FINGER, RIGHT, INITIAL ENCOUNTER: ICD-10-CM

## 2022-03-22 PROCEDURE — G8510 SCR DEP NEG, NO PLAN REQD: HCPCS | Performed by: INTERNAL MEDICINE

## 2022-03-22 PROCEDURE — 1101F PT FALLS ASSESS-DOCD LE1/YR: CPT | Performed by: INTERNAL MEDICINE

## 2022-03-22 PROCEDURE — 99214 OFFICE O/P EST MOD 30 MIN: CPT | Performed by: INTERNAL MEDICINE

## 2022-03-22 PROCEDURE — G0439 PPPS, SUBSEQ VISIT: HCPCS | Performed by: INTERNAL MEDICINE

## 2022-03-22 PROCEDURE — 3017F COLORECTAL CA SCREEN DOC REV: CPT | Performed by: INTERNAL MEDICINE

## 2022-03-22 PROCEDURE — G8427 DOCREV CUR MEDS BY ELIG CLIN: HCPCS | Performed by: INTERNAL MEDICINE

## 2022-03-22 PROCEDURE — G8536 NO DOC ELDER MAL SCRN: HCPCS | Performed by: INTERNAL MEDICINE

## 2022-03-22 PROCEDURE — G8420 CALC BMI NORM PARAMETERS: HCPCS | Performed by: INTERNAL MEDICINE

## 2022-03-22 NOTE — PATIENT INSTRUCTIONS
DASH Diet: Care Instructions  Your Care Instructions     The DASH diet is an eating plan that can help lower your blood pressure. DASH stands for Dietary Approaches to Stop Hypertension. Hypertension is high blood pressure. The DASH diet focuses on eating foods that are high in calcium, potassium, and magnesium. These nutrients can lower blood pressure. The foods that are highest in these nutrients are fruits, vegetables, low-fat dairy products, nuts, seeds, and legumes. But taking calcium, potassium, and magnesium supplements instead of eating foods that are high in those nutrients does not have the same effect. The DASH diet also includes whole grains, fish, and poultry. The DASH diet is one of several lifestyle changes your doctor may recommend to lower your high blood pressure. Your doctor may also want you to decrease the amount of sodium in your diet. Lowering sodium while following the DASH diet can lower blood pressure even further than just the DASH diet alone. Follow-up care is a key part of your treatment and safety. Be sure to make and go to all appointments, and call your doctor if you are having problems. It's also a good idea to know your test results and keep a list of the medicines you take. How can you care for yourself at home? Following the DASH diet  · Eat 4 to 5 servings of fruit each day. A serving is 1 medium-sized piece of fruit, ½ cup chopped or canned fruit, 1/4 cup dried fruit, or 4 ounces (½ cup) of fruit juice. Choose fruit more often than fruit juice. · Eat 4 to 5 servings of vegetables each day. A serving is 1 cup of lettuce or raw leafy vegetables, ½ cup of chopped or cooked vegetables, or 4 ounces (½ cup) of vegetable juice. Choose vegetables more often than vegetable juice. · Get 2 to 3 servings of low-fat and fat-free dairy each day. A serving is 8 ounces of milk, 1 cup of yogurt, or 1 ½ ounces of cheese. · Eat 6 to 8 servings of grains each day.  A serving is 1 slice of bread, 1 ounce of dry cereal, or ½ cup of cooked rice, pasta, or cooked cereal. Try to choose whole-grain products as much as possible. · Limit lean meat, poultry, and fish to 2 servings each day. A serving is 3 ounces, about the size of a deck of cards. · Eat 4 to 5 servings of nuts, seeds, and legumes (cooked dried beans, lentils, and split peas) each week. A serving is 1/3 cup of nuts, 2 tablespoons of seeds, or ½ cup of cooked beans or peas. · Limit fats and oils to 2 to 3 servings each day. A serving is 1 teaspoon of vegetable oil or 2 tablespoons of salad dressing. · Limit sweets and added sugars to 5 servings or less a week. A serving is 1 tablespoon jelly or jam, ½ cup sorbet, or 1 cup of lemonade. · Eat less than 2,300 milligrams (mg) of sodium a day. If you limit your sodium to 1,500 mg a day, you can lower your blood pressure even more. · Be aware that all of these are the suggested number of servings for people who eat 1,800 to 2,000 calories a day. Your recommended number of servings may be different if you need more or fewer calories. Tips for success  · Start small. Do not try to make dramatic changes to your diet all at once. You might feel that you are missing out on your favorite foods and then be more likely to not follow the plan. Make small changes, and stick with them. Once those changes become habit, add a few more changes. · Try some of the following:  ? Make it a goal to eat a fruit or vegetable at every meal and at snacks. This will make it easy to get the recommended amount of fruits and vegetables each day. ? Try yogurt topped with fruit and nuts for a snack or healthy dessert. ? Add lettuce, tomato, cucumber, and onion to sandwiches. ? Combine a ready-made pizza crust with low-fat mozzarella cheese and lots of vegetable toppings. Try using tomatoes, squash, spinach, broccoli, carrots, cauliflower, and onions. ?  Have a variety of cut-up vegetables with a low-fat dip as an appetizer instead of chips and dip. ? Sprinkle sunflower seeds or chopped almonds over salads. Or try adding chopped walnuts or almonds to cooked vegetables. ? Try some vegetarian meals using beans and peas. Add garbanzo or kidney beans to salads. Make burritos and tacos with mashed henley beans or black beans. Where can you learn more? Go to http://www.costa.com/  Enter H967 in the search box to learn more about \"DASH Diet: Care Instructions. \"  Current as of: January 10, 2022               Content Version: 13.2  © 2006-2022 [employer]. Care instructions adapted under license by [employer] (which disclaims liability or warranty for this information). If you have questions about a medical condition or this instruction, always ask your healthcare professional. [place] [address] any warranty or liability for your use of this information. The best way to stay healthy is to live a healthy lifestyle. A healthy lifestyle includes regular exercise, eating a well-balanced diet, keeping a healthy weight and not smoking. Regular physical exams and screening tests are another important way to take care of yourself. Preventive exams provided by health care providers can find health problems early when treatment works best and can keep you from getting certain diseases or illnesses. Preventive services include exams, lab tests, screenings, shots, monitoring and information to help you take care of your own health. All people over 65 should have a pneumonia shot. Pneumonia shots are usually only needed once in a lifetime unless your doctor decides differently. In addition to your physical exam, some screening tests are recommended:    All people over 65 should have a yearly flu shot. People over 65 are at medium to high risk for Hepatitis B. Three shots are needed for complete protection.      Bone mass measurement (dexa scan) is recommended every two years. Diabetes Mellitus screening is recommended every year. Glaucoma is an eye disease caused by high pressure in the eye. An eye exam is recommended every year. Cardiovascular screening tests that check your cholesterol and other blood fat (lipid) levels are recommended every five years. Colorectal Cancer screening tests help to find pre-cancerous polyps (growths in the colon) so they can be removed before they turn into cancer. Tests ordered for screening depend on your personal and family history risk factors. Prostate Cancer Screening (annually up to age 76)    Screening for breast cancer is recommended yearly with a Mammogram.    Screening for cervical and vaginal cancer is recommended with a pelvic and Pap test every two years. However if you have had an abnormal pap in the past  three years or at high risk for cervical or vaginal cancer Medicare will cover a pap test and a pelvic exam every year.      Here is a list of your current Health Maintenance items with a due date:  Health Maintenance Due   Topic Date Due    Cholesterol Test   Never done    DTaP/Tdap/Td  (1 - Tdap) Never done    Shingles Vaccine (1 of 2) Never done    Pneumococcal Vaccine 65+ years at Risk (1 of 2 - PCV13) Never done    Annual Well Visit  04/03/2021    COVID-19 Vaccine (3 - Moderna risk 4-dose series) 12/17/2021

## 2022-03-22 NOTE — PROGRESS NOTES
Chief Complaint   Patient presents with    Annual Wellness Visit    Follow Up Chronic Condition     6 mo fu    Finger Pain       Depression Risk Factor Screening:     3 most recent PHQ Screens 3/22/2022   Little interest or pleasure in doing things Not at all   Feeling down, depressed, irritable, or hopeless Not at all   Total Score PHQ 2 0       Functional Ability and Level of Safety:     Activities of Daily Living  ADL Assessment 3/16/2021   Feeding yourself No Help Needed   Getting from bed to chair No Help Needed   Getting dressed No Help Needed   Bathing or showering No Help Needed   Walk across the room (includes cane/walker) No Help Needed   Using the telphone No Help Needed   Taking your medications No Help Needed   Preparing meals No Help Needed   Managing money (expenses/bills) No Help Needed   Moderately strenuous housework (laundry) No Help Needed   Shopping for personal items (toiletries/medicines) No Help Needed   Shopping for groceries No Help Needed   Driving No Help Needed   Climbing a flight of stairs No Help Needed   Getting to places beyond walking distances No Help Needed       Fall Risk  Fall Risk Assessment, last 12 mths 10/29/2021   Able to walk? Yes   Fall in past 12 months? 0   Do you feel unsteady? 0   Are you worried about falling 0       Abuse Screen  Abuse Screening Questionnaire 3/16/2021   Do you ever feel afraid of your partner? N   Are you in a relationship with someone who physically or mentally threatens you? N   Is it safe for you to go home?  Y         Patient Care Team   Patient Care Team:  Andie Green MD as PCP - General (Internal Medicine)  Andie Green MD as PCP - REHABILITATION [facility] Empaneled Provider  Yousuf Hermosillo MD (General Surgery)

## 2022-03-22 NOTE — PROGRESS NOTES
Ev Gomez is a 74 y.o. male and presents with Annual Wellness Visit, Follow Up Chronic Condition (6 mo fu), and Finger Pain  . Subjective:  Mr. Kacie Connell presents to the office today for a Medicare wellness check and also complains of having lacerated the tip of his right fifth finger. He is also due for follow-up of multiple other medical problems. The patient states that he was cutting some sausage last evening and was using a paring knife. He lacerated the tip of his right fifth finger and has applied a Band-Aid. He has had no bleeding since then. It did not involved the DIP joint. He states he is up-to-date on his tetanus vaccination. The patient has chronic insomnia and takes Ambien on a nightly basis. It continues to work effectively for him allowing him to sleep and feel refreshed in the morning. He denies any impairment or hangover effect of the medication. The patient has hypercholesterolemia currently on statin therapy. He tolerates this without muscle soreness or GI upset. He does have a history of coronary artery disease with 3 previous stents and is followed by Dr. Will Archer. He recently had his cholesterol level checked. BPH is followed by [facility] and he currently is taking Cardura and oxybutynin and finasteride. PSAs have been elevated. He notes less frequency of urination with his current regimen. Patient has psoriatic arthritis followed by . He is prescribed oxycodone for pain and has received Narcan prescription in the past due to his Ambien usage. In addition he takes Cosentyx and methotrexate. He did recently have laboratory studies performed.     Past Medical History:   Diagnosis Date    BPH with obstruction/lower urinary tract symptoms 3/23/2018    Chronic insomnia 3/23/2018    Coronary artery disease involving native coronary artery 3/23/2018    Status post stents ×3    Hypercholesterolemia 3/23/2018    Immunosuppression due to drug therapy ([facility] [address] 4/10/2019    Primary open angle glaucoma of right eye 3/23/2018    PSA elevation 3/23/2018    Status post biopsy. Atypical small acinar proliferation    Psoriatic arthritis ([facility] [address]      Past Surgical History:   Procedure Laterality Date    COLONOSCOPY N/A 12/13/2018    COLONOSCOPY performed by Lynnette Mena [facility] at [facility] ENDOSCOPY    HX HEART CATHETERIZATION  05/2015    HX HERNIA REPAIR  05/29/2019    Open repair of right inguinal hernia with mesh     HX KNEE ARTHROSCOPY Bilateral     HX ORTHOPAEDIC Right     Cubital tunnel sx    IR INJ FORAMIN EPID LUMB ANES/STER ADDL  4/23/2019    IR INJ FORAMIN EPID LUMB ANES/STER SNGL  4/23/2019     No Known Allergies  Current Outpatient Medications   Medication Sig Dispense Refill    zolpidem (AMBIEN) 10 mg tablet Take 1 Tablet by mouth nightly. Max Daily Amount: 10 mg. 90 Tablet 1    oxyCODONE-acetaminophen (PERCOCET 7.5) 7.5-325 mg per tablet Take  by mouth every six (6) hours as needed for Pain.  naloxone (NARCAN) 4 mg/actuation nasal spray Use 1 spray intranasally, then discard. Repeat with new spray every 2 min as needed for opioid overdose symptoms, alternating nostrils. 2 Each 0    latanoprost (XALATAN) 0.005 % ophthalmic solution       ibuprofen (MOTRIN) 800 mg tablet Take 1 Tab by mouth three (3) times daily (with meals). 90 Tab 0    cholecalciferol, VITAMIN D3, (VITAMIN D3) 5,000 unit tab tablet Take 5,000 Units by mouth daily.  ascorbic acid, vitamin C, (VITAMIN C) 500 mg tablet Take 1,000 mg by mouth daily.  docusate sodium (COLACE) 100 mg capsule Take 1 Cap by mouth two (2) times a day. 30 Cap 0    secukinumab (COSENTYX PEN) 150 mg/mL pnij by SubCUTAneous route every thirty (30) days.  atorvastatin (LIPITOR) 20 mg tablet Take 1 Tab by mouth nightly. 30 Tab 6    aspirin delayed-release 81 mg tablet Take  by mouth daily.  doxazosin (CARDURA) 4 mg tablet Take 4 mg by mouth nightly.  Indications: BENIGN PROSTATIC HYPERTROPHY      oxybutynin chloride XL 10 mg CR tablet Take 10 mg by mouth nightly.  finasteride (PROSCAR) 5 mg tablet Take 5 mg by mouth nightly.  folic acid (FOLVITE) 1 mg tablet Take 1 mg by mouth daily.  methotrexate (RHEUMATREX) 2.5 mg tablet Take 15 mg by mouth every .        Social History     Socioeconomic History    Marital status:    Tobacco Use    Smoking status: Former Smoker     Packs/day: 0.25     Years: 2.00     Pack years: 0.50     Quit date: 1972     Years since quittin.2    Smokeless tobacco: Never Used   Vaping Use    Vaping Use: Never used   Substance and Sexual Activity    Alcohol use: No    Drug use: No     Family History   Problem Relation Age of Onset    Bleeding Prob Mother     No Known Problems Father     Cancer Sister         rectal    Heart Disease Brother     Hypertension Brother        Health Maintenance   Topic Date Due    Lipid Screen  Never done    DTaP/Tdap/Td series (1 - Tdap) Never done    Shingrix Vaccine Age 50> (1 of 2) Never done    Pneumococcal 65+ yrs at Risk Vaccine (1 of 2 - PCV13) Never done    COVID-19 Vaccine (3 - Moderna risk 4-dose series) 2021    Depression Screen  10/29/2022    Medicare Yearly Exam  2023    Colorectal Cancer Screening Combo  2028    Hepatitis C Screening  Completed    AAA Screening 73-69 YO Male Smoking Patients  Completed    Flu Vaccine  Completed        Review of Systems  Constitutional: negative for fevers, chills, anorexia and weight loss  Eyes:   negative for visual disturbance and irritation  ENT:   negative for tinnitus,sore throat,nasal congestion,ear pain,hoarseness  Respiratory:  negative for cough, hemoptysis, dyspnea,wheezing  CV:   negative for chest pain, palpitations, lower extremity edema  GI:   negative for nausea, vomiting, diarrhea, abdominal pain,melena  Endo:               negative for polyuria,polydipsia,polyphagia,heat intolerance  Genitourinary: negative for frequency, dysuria and hematuria  Integumentary: negative for rash and pruritus  Hematologic:  negative for easy bruising and gum/nose bleeding  Musculoskel: Complains of injury to right fifth finger  Neurological:  negative for headaches, dizziness, vertigo, memory problems and gait   Behavl/Psych: negative for feelings of anxiety, depression, mood changes  ROS otherwise negative      Objective:  Visit Vitals  /70 (BP 1 Location: Left upper arm, BP Patient Position: Sitting, BP Cuff Size: Adult)   Pulse 79   Temp 98 °F (36.7 °C) (Oral)   Resp 16   Ht 6' (1.829 m)   Wt 178 lb 12.8 oz (81.1 kg)   SpO2 98%   BMI 24.25 kg/m²     Body mass index is 24.25 kg/m². Physical Exam:   General appearance - alert, well appearing, and in no distress  Mental status - alert, oriented to person, place, and time  EYE-GROVER, EOMI,conjunctiva normal bilaterally, lids normal  ENT-ENT exam normal, no neck nodes or sinus tenderness  Nose - normal and patent, no erythema,  Or discharge   Mouth - mucous membranes moist, pharynx normal without lesions  Neck - supple, no significant adenopathy or bruit  Chest - clear to auscultation, no wheezes, rales or rhonchi. Heart - normal rate, regular rhythm, normal S1, S2, no murmurs, rubs, clicks or gallops   Abdomen - soft, nontender, nondistended, no masses or organomegaly  Lymph- no adenopathy palpable  Ext-there is a 1 cm laceration across the top of the right fifth finger with well approximated wound edges. There is no bleeding and no signs of infection. Skin-Warm and dry. no hyperpigmentation, vitiligo, or suspicious lesions  Neuro -alert, oriented, normal speech, no focal findings or movement disorder noted    In addition this patient is seen for AWV  as detailed below:     This is a Subsequent Medicare Annual Wellness Exam (AWV) (Performed 12 months after IPPE or effective date of Medicare Part B enrollment)    I have reviewed the patient's medical history in detail and updated the computerized patient record. Problem list reviewed with patient and risk factors discussed. PSH, SH, FH, Medications and HM issues also reviewed and discussed. Depression screen, fall risk assessment, functional abilities and ACP also reviewed and discussed as above and below. Depression Risk Factor Screening:     3 most recent PHQ Screens 3/22/2022   Little interest or pleasure in doing things Not at all   Feeling down, depressed, irritable, or hopeless Not at all   Total Score PHQ 2 0     Alcohol Risk Factor Screening: You do not drink alcohol or very rarely. Functional Ability and Level of Safety:   Hearing Loss  Hearing is good. Activities of Daily Living  The home contains: no safety equipment. Patient does total self care    Fall Risk  Fall Risk Assessment, last 12 mths 10/29/2021   Able to walk? Yes   Fall in past 12 months? 0   Do you feel unsteady? 0   Are you worried about falling 0       Abuse Screen  Patient is not abused    Cognitive Screening   Evaluation of Cognitive Function:  Has your family/caregiver stated any concerns about your memory: no  Normal    Patient Care Team   Patient Care Team:  Chrissy Richmond MD as PCP - General (Internal Medicine)  Chrissy Richmond MD as PCP - [facility]  Frank Gregorio MD (General Surgery)    Assessment/Plan   Education and counseling provided:  Are appropriate based on today's review and evaluation    Assessment/Plan:   Impressions:      ICD-10-CM ICD-9-CM    1. Medicare annual wellness visit, subsequent  Z00.00 V70.0    2. Hypercholesterolemia  E78.00 272.0    3. Coronary artery disease involving native coronary artery of native heart without angina pectoris  I25.10 414.01    4. Chronic insomnia  F51.04 780.52    5. BPH with obstruction/lower urinary tract symptoms  N40.1 600.01     N13.8 599.69    6. Psoriatic arthritis ([facility] [address]  L40.50 696.0    7.  Immunosuppression due to drug therapy ([facility]  D84.821 V58.69     Z79.899     8. Laceration of finger, right, initial encounter  S61.219A 883.0         Plan:  1. Continue present meds  2. Lifestyle modifications including Na restriction, low carb/fat diet, weight reduction and exercise (at least a walking program). Follow-up and Dispositions    · Return in about 6 months (around 9/22/2022). No orders of the defined types were placed in this encounter. Hari Brown MD   Assessment/Plan:  Diagnoses and all orders for this visit:    Medicare annual wellness visit, subsequent    Hypercholesterolemia    Coronary artery disease involving native coronary artery of native heart without angina pectoris    Chronic insomnia    BPH with obstruction/lower urinary tract symptoms    [facility] ([facility]    Immunosuppression due to drug therapy ([facility]    Laceration of finger, right, initial encounter        Health Maintenance Due   Topic Date Due    Lipid Screen  Never done    DTaP/Tdap/Td series (1 - Tdap) Never done    Shingrix Vaccine Age 50> (1 of 2) Never done    Pneumococcal 65+ yrs at Risk Vaccine (1 of 2 - PCV13) Never done    COVID-19 Vaccine (3 - Moderna risk 4-dose series) 12/17/2021       Other instructions: The patient's medications were reviewed and reconciled. No change in his current medical regimen will be made. A prudent diet and exercise is encouraged    Health maintenance issues were reviewed and patient has received COVID19 vaccinations, shingles vaccination. Pneumonia vaccination has been recommended. Lipid screen was recently performed at cardiology office    The right fifth finger had a laceration over the top of the finger and closure was obtained with Steri-Strips and it was subsequently dressed with a sterile Band-Aid.     Await copies of labs from his cardiologist and his rheumatologist    Follow-up in 6 months    Follow-up and Dispositions    · Return in about 6 months (around 9/22/2022). I have reviewed with the patient details of the assessment and plan and all questions were answered. Relevent patient education was performed. The most recent lab findings were reviewed with the patient. An After Visit Summary was printed and given to the patient. Leo Walsh MD    Please note that this dictation was completed with [email or web address], the computer voice recognition software. Quite often unanticipated grammatical, syntax, homophones, and other interpretive errors are inadvertently transcribed by the computer software. Please disregard these errors. Please excuse any errors that have escaped final proofreading.

## 2022-09-18 NOTE — PROGRESS NOTES
ICD-10-CM ICD-9-CM    1. Routine adult health maintenance  Z00.00 V70.0 MICROALBUMIN, UR, RAND W/ MICROALB/CREAT RATIO      2. Hypercholesterolemia  E78.00 272.0 LIPID PANEL      3. Coronary artery disease involving native coronary artery of native heart without angina pectoris  I25.10 414.01       4. Psoriatic arthritis ([facility] [address]  L40.50 696.0 CBC WITH AUTOMATED DIFF      METABOLIC PANEL, COMPREHENSIVE      5. Chronic insomnia  F51.04 780.52 zolpidem (AMBIEN) 10 mg tablet               Subjective:     Chief Complaint   Patient presents with    Establish Care       Nona Burgos is a 74 y.o. M. He has a past medical history of coronary artery disease, hypercholesterolemia, and psoriatic arthritis, among other medical months. I reviewed and updated the medical record. This patient was seen by his previous primary care provider most recently in late March for a Medicare wellness visit. He had previously cut the tip of his right fifth finger while preparing food. He was noted to be using atorvastatin without problems for his history of hypercholesterolemia. He was noted to have a history of coronary artery disease status post 3 previous coronary artery stents and was being followed by cardiology at the time. He was also noted to be following up regularly with rheumatology for his history of psoriatic arthritis; he had been prescribed previously oxycodone for pain, in addition to chronic Ambien. He was being treated also with Cosentyx and methotrexate as well. Physical examination at the time had been unremarkable. No changes were made to his medication regimen. He was advised to follow-up in 6 months. Today, the patient comes in for routine follow-up on his chronic medical concerns. He continues be followed by his rheumatologist for his history of psoriatic arthritis, and he continues to find his symptoms to be well controlled with combination of methotrexate and Cosentyx.   He denies having had any worsening joint pains, fevers, chills, skin changes, or other constitutional or systemic complaints. In addition, he continues to be followed by his cardiologist, who has been managing his cholesterol medication. Overall, he has had no recent change in his exercise tolerance, or any chest pain, shortness breath, or any further cardiopulmonary symptoms. No muscle aches or GI symptoms while using his current dosing of atorvastatin 20 mg daily. He has his Percocet usually filled by his rheumatologist, in addition to Narcan. He also reports a history of chronic insomnia, for which he requests a refill of his Ambien. He takes this every night, usually using half a tablet nightly, and does not wish to modify his treatment at this time. We spent several minutes today discussing the potential risk of interaction of his Ambien and the Percocet, and advised him about the risk of respiratory depression associated with this combination of medications. We also discussed the risk of dependence on his Ambien. He notes that he typically has good relief of his insomnia with the Ambien, and denies having had any side effects. Other than this, he is notably up-to-date with regard to routine screening measures, and his review of systems is otherwise negative. Routine Healthcare Maintenance issues are reviewed and discussed with the patient as noted below. Orders to update gaps in healthcare maintenance were placed as noted below in the Assessment and Plan, where applicable. Past Medical History:  Past Medical History:   Diagnosis Date    BPH with obstruction/lower urinary tract symptoms 03/23/2018    Chronic insomnia 03/23/2018    Chronic prescription opiate use     Coronary artery disease involving native coronary artery 03/23/2018    Status post stents ×3    Former smoker, stopped smoking in distant past     History of elevated PSA 03/23/2018    Status post biopsy.   Atypical small acinar proliferation    History of immunosuppressive therapy     History of left heart catheterization 05/2015    1. Significant 3-vessel coronary artery disease. 2. Well-preserved left ventricular systolic function. 3. Normal left ventricular end-diastolic pressure. Hypercholesterolemia 03/23/2018    Primary open angle glaucoma of right eye 03/23/2018    Psoriatic arthritis ([facility] [address]     Dr. Stephanie Whitt (Rheumatology) following - RX = Cosentyx + MTX    Vitamin D deficiency        Past Surgical Histor:  Past Surgical History:   Procedure Laterality Date    COLONOSCOPY N/A 12/13/2018    COLONOSCOPY performed by Makenzie Perez MD at [facility] ENDOSCOPY    HX HEART CATHETERIZATION  05/2015    HX HERNIA REPAIR  05/29/2019    Open repair of right inguinal hernia with mesh     HX KNEE ARTHROSCOPY Bilateral     HX ORTHOPAEDIC Right     Cubital tunnel sx    IR INJ FORAMIN EPID LUMB ANES/STER ADDL  4/23/2019    IR INJ FORAMIN EPID LUMB ANES/STER SNGL  4/23/2019       Allergies:  No Known Allergies    Medications:  Current Outpatient Medications   Medication Sig Dispense Refill    zolpidem (AMBIEN) 10 mg tablet Take 1 Tablet by mouth nightly. Max Daily Amount: 10 mg. 90 Tablet 1    oxyCODONE-acetaminophen (PERCOCET 7.5) 7.5-325 mg per tablet Take  by mouth every six (6) hours as needed for Pain.      naloxone (NARCAN) 4 mg/actuation nasal spray Use 1 spray intranasally, then discard. Repeat with new spray every 2 min as needed for opioid overdose symptoms, alternating nostrils. 2 Each 0    latanoprost (XALATAN) 0.005 % ophthalmic solution       ascorbic acid, vitamin C, (VITAMIN C) 500 mg tablet Take 1,000 mg by mouth daily. secukinumab 150 mg/mL pnij by SubCUTAneous route every thirty (30) days. atorvastatin (LIPITOR) 20 mg tablet Take 1 Tab by mouth nightly. 30 Tab 6    aspirin delayed-release 81 mg tablet Take  by mouth daily. doxazosin (CARDURA) 4 mg tablet Take 4 mg by mouth nightly.  Indications: BENIGN PROSTATIC HYPERTROPHY      oxybutynin chloride XL 10 mg CR tablet Take 10 mg by mouth nightly. finasteride (PROSCAR) 5 mg tablet Take 5 mg by mouth nightly. folic acid (FOLVITE) 1 mg tablet Take 1 mg by mouth daily. methotrexate (RHEUMATREX) 2.5 mg tablet Take 15 mg by mouth every . cholecalciferol, VITAMIN D3, (VITAMIN D3) 5,000 unit tab tablet Take 5,000 Units by mouth daily.          Social History:  Social History     Socioeconomic History    Marital status:    Tobacco Use    Smoking status: Former     Packs/day: 0.25     Years: 2.00     Pack years: 0.50     Types: Cigarettes     Quit date: 1972     Years since quittin.7    Smokeless tobacco: Never   Vaping Use    Vaping Use: Never used   Substance and Sexual Activity    Alcohol use: No    Drug use: No     Social Determinants of Health     Physical Activity: Unknown    Days of Exercise per Week: Patient refused    Minutes of Exercise per Session: 0 min       Family History:  Family History   Problem Relation Age of Onset    Bleeding Prob Mother     No Known Problems Father     Cancer Sister         rectal    Heart Disease Brother     Hypertension Brother        Immunizations:  Immunization History   Administered Date(s) Administered    COVID-19, MODERNA BLUE border, Primary or Immunocompromised, (age 18y+), IM, 100 mcg/0.5mL 03/10/2021, 2021    COVID-19, MODERNA Booster BLUE border, (age 18y+), IM, 50mcg/0.25mL 2021    Influenza High Dose Vaccine PF 2021    Influenza Vaccine 2017, 10/01/2018    Influenza Vaccine (Tri) Adjuvanted (>65 Yrs FLUAD TRI 63090) 10/04/2019    Influenza, FLUAD, (age 72 y+), Adjuvanted 10/28/2020    Zoster Recombinant 2021, 2021        Healthcare Maintenance:  Health Maintenance   Topic Date Due    Pneumococcal 65+ years (1 - PCV) Never done    Lipid Screen  Never done    DTaP/Tdap/Td series (1 - Tdap) Never done    COVID-19 Vaccine (3 - Moderna risk series) 2021    Flu Vaccine (1) 2022 Depression Screen  03/22/2023    Medicare Yearly Exam  03/23/2023    Colorectal Cancer Screening Combo  12/13/2028    Hepatitis C Screening  Completed    AAA Screening 73-67 YO Male Smoking Patients  Completed    Shingrix Vaccine Age 50>  Completed        Review of Systems:  ROS:  Review of Systems   Constitutional: Negative. HENT: Negative. Eyes: Negative. Respiratory: Negative. Cardiovascular: Negative. Gastrointestinal: Negative. Genitourinary: Negative. Musculoskeletal:  Positive for joint pain. Skin: Negative. Neurological: Negative. Endo/Heme/Allergies: Negative. Psychiatric/Behavioral:  The patient has insomnia. ROS otherwise negative      Objective:     Vital Signs:  Visit Vitals  /83 (BP 1 Location: Left upper arm, BP Patient Position: Sitting, BP Cuff Size: Adult)   Pulse 61   Temp 98 °F (36.7 °C) (Oral)   Resp 16   Ht 6' (1.829 m)   Wt 176 lb 3.2 oz (79.9 kg)   SpO2 97%   BMI 23.90 kg/m²       BMI:  Body mass index is 23.9 kg/m². Physical Examination:  Physical Exam  Vitals reviewed. Constitutional:       Appearance: Normal appearance. HENT:      Head: Normocephalic and atraumatic. Nose: Nose normal.      Mouth/Throat:      Mouth: Mucous membranes are moist.   Eyes:      Extraocular Movements: Extraocular movements intact. Conjunctiva/sclera: Conjunctivae normal.      Pupils: Pupils are equal, round, and reactive to light. Cardiovascular:      Rate and Rhythm: Normal rate and regular rhythm. Pulses: Normal pulses. Heart sounds: Normal heart sounds. No murmur heard. No friction rub. No gallop. Pulmonary:      Effort: Pulmonary effort is normal. No respiratory distress. Breath sounds: Normal breath sounds. No wheezing, rhonchi or rales. Abdominal:      General: Bowel sounds are normal. There is no distension. Palpations: Abdomen is soft. There is no mass.       Tenderness: no abdominal tenderness There is no guarding or rebound. Musculoskeletal:         General: No tenderness, deformity or signs of injury. Normal range of motion. Cervical back: Normal range of motion and neck supple. Right lower leg: No edema. Left lower leg: No edema. Skin:     General: Skin is warm and dry. Findings: No bruising, lesion or rash. Neurological:      General: No focal deficit present. Mental Status: He is alert and oriented to person, place, and time. Mental status is at baseline. Cranial Nerves: No cranial nerve deficit. Sensory: No sensory deficit. Motor: No weakness. Coordination: Coordination normal.      Gait: Gait normal.      Deep Tendon Reflexes: Reflexes normal.   Psychiatric:         Mood and Affect: Mood normal.         Behavior: Behavior normal.        Physical exam otherwise negative    Diagnostic Testing:    Laboratory Studies:  No visits with results within 6 Month(s) from this visit. Latest known visit with results is:   Abstract on 03/03/2021   Component Date Value Ref Range Status    SARS-CoV-2, KEYONA 12/28/2020 Not Detected   Final         Radiographic Studies:  XR Results (most recent):  Results from [place] encounter on 07/17/19    XR SPINE THORAC 3 V    Narrative  EXAM:  XR SPINE THORAC 3 V    INDICATION: Mid back pain for several weeks    COMPARISON: 5/4/2019. TECHNIQUE: 3 views thoracic spine    FINDINGS: There is no acute fracture or subluxation. Vertebral body heights are  maintained. There is stable mild diffuse degenerative change. There is no  abnormality in alignment. Impression  IMPRESSION: No acute abnormality. Stable mild diffuse degenerative changes. HEMAL Results (most recent):  No results found for this or any previous visit. CT Results (most recent):  Results from Hospital Encounter encounter on 07/17/19    CTA CHEST W OR W WO CONT    Narrative  EXAM:  CT angiography chest    INDICATION: Pleuritic chest pain.  Thoracic spine tenderness to palpation. COMPARISON: Radiographs 7/17/2019. CT ABD pelvis 5/4/2019. TECHNIQUE: Helical thin section chest CT following uneventful intravenous  administration of nonionic contrast according to departmental PE protocol. Coronal and sagittal reformats were performed. 3D/MIP post processing was  performed. CT dose reduction was achieved through use of a standardized protocol  tailored for this examination and automatic exposure control for dose  modulation. FINDINGS: This is a good quality study for the evaluation of pulmonary embolism  to the first subsegmental arterial level. There is no pulmonary embolism to this  level. The visualized thyroid gland is unremarkable. The aorta and main pulmonary  artery are normal in caliber. Cardiac size is within normal limits. No  pericardial effusion. No lymphadenopathy by imaging size criteria. The lungs are clear. No pleural effusion or pneumothorax. Central airways are  unremarkable. Low density liver lesions previously seen to represent hemangiomas are  unchanged. There is no acute abnormality in the visualized upper abdomen. There is a partially visualized mixed sclerotic and lytic bony lesion involving  the anterior and posterior elements of the L1 vertebral body. This is unchanged  dating back to 9/9/2009. No other bony lesion is identified. Impression  IMPRESSION:  1. No acute pulmonary embolism or other acute abnormality in the chest.    2. Stable low-density liver lesions previously seen to represent hemangiomas. 3. A partially visualized mixed sclerotic and lytic lesion in the L1 vertebral  body is unchanged dating back to 2009 and therefore benign. No other bony lesion  or acute fracture. DEXA Results (most recent):  No results found for this or any previous visit.      MRI Results (most recent):  Results from [place] encounter on 03/20/19    MRI LUMB SPINE WO CONT    Narrative  EXAM: MRI LUMB SPINE WO CONT    INDICATION: Sciatica, left side    COMPARISON: November 2011    TECHNIQUE: MR imaging of the lumbar spine was performed using the following  sequences: sagittal T1, T2, STIR;  axial T1, T2.    CONTRAST:  None. FINDINGS:    Alignment of the lumbar spine is stable. The conus medullaris terminates at T12. Vertebral body heights are normal. Marrow signal is heterogeneous at L1 due to a  hemangioma. No fracture. Paraspinal tissues are within normal limits. Simple  cyst at the lower pole of the right kidney. Lower thoracic spine: No herniation or stenosis. L1-L2: There is enlargement of the L1 vertebral body with heterogeneous signal  likely due to a hemangioma. There is disc height loss at L1-2 with a disc  osteophyte protrusion posteriorly narrowing the subarticular zone and the neural  foramen bilaterally. Moderate neural foraminal narrowing bilaterally. No canal  stenosis. L2-L3: Mild diffuse disc bulge extending into the foramen bilaterally with mild  foraminal narrowing. No canal stenosis. L3-L4: Moderate facet arthropathy and ligamentum flavum thickening. Foraminal  disc bulges bilaterally resulting in mild foraminal stenosis. No canal stenosis. L4-L5: Moderate facet arthropathy and ligamentum flavum thickening. Diffuse disc  bulge with small foraminal components resulting in mild foraminal stenosis. No  canal stenosis. L5-S1: Mild facet arthropathy and ligamentum flavum thickening. No foraminal or  canal narrowing. Impression  IMPRESSION:  1. Degenerative changes at L1-2 resulting in moderate foraminal narrowing  bilaterally. 2.  Mild foraminal narrowing at L2-3, L3-4, and L4-5 as a result of foraminal  disc bulges. Assessment/Plan:       ICD-10-CM ICD-9-CM    1. Routine adult health maintenance  Z00.00 V70.0 MICROALBUMIN, UR, RAND W/ MICROALB/CREAT RATIO      2. Hypercholesterolemia  E78.00 272.0 LIPID PANEL      3.  Coronary artery disease involving native coronary artery of native heart without angina pectoris  I25.10 414.01       4. Psoriatic arthritis ([facility] [address]  L40.50 696.0 CBC WITH AUTOMATED DIFF      METABOLIC PANEL, COMPREHENSIVE      5. Chronic insomnia  F51.04 780.52 zolpidem (AMBIEN) 10 mg tablet             Healthcare Maintenance:  - Preventive measures are reviewed as per above  - Up to date on routine interventions except as noted above  - Orders placed to update gaps as noted  - Notes: Up-to-date with regard to routine screening measures, fasting labs as noted above, otherwise continuing current care, advised vaccines as noted above    ASCVD:   - Type: CAD s/p prior stenting x3   - Current Symptoms: No Symptoms, no angina   - History and Contributing Factors: elevated cholesterol, hypertension, and known history of coronary artery disease  Key CAD CHF Meds               atorvastatin (LIPITOR) 20 mg tablet (Taking) Take 1 Tab by mouth nightly. aspirin delayed-release 81 mg tablet (Taking) Take  by mouth daily. doxazosin (CARDURA) 4 mg tablet (Taking) Take 4 mg by mouth nightly. Indications: BENIGN PROSTATIC HYPERTROPHY            - Target BP: < 130/80 mmHg; see Blood Pressure section   - Statin? YES   - Antiplatelet and/or Anticoagulant? YES   - ACEI/ARB? NO   - Beta Blocker? NO   - Notes: Asymptomatic, follows with cardiology, blood pressure at target, no recent LDL to guide therapy. Continuing with current care, checking labs as noted above. Hyperlipidemia/Dyslipidemia:   - Summary of Cardiovascular Risks and Goals:     LDL goal is under 80  existing CAD  hyperlipidemia   - No results found for: LDLC, HDL    - Relevant Cholesterol Meds:  Key Antihyperlipidemia Meds               atorvastatin (LIPITOR) 20 mg tablet (Taking) Take 1 Tab by mouth nightly.               - Cholesterol at target: n/a   - Does patient meet USPSTF and ACC/AHA indications for pharmacotherapy (e.g., statin): yes   - GI symptoms with meds: NO   - Muscle aches with meds: NO   - Other Adverse effects with meds: NO   - Medication Plan: continue   - Notes: Follows regularly with cardiology, who is prescribing his medication. Checking LDL as noted above. No side effects noted. Continuing with current care. Psoriatic Arthritis:   - Following with rheumatology for same. Currently on Cosentyx and methotrexate. Rheumatology checking labs regularly. No lung symptoms currently. Continuing with current care, given good control of symptoms overall with current medication regimen. No recent infectious symptoms including any fevers or chills or constitutional complaints. Deferring additional changes to rheumatology. Follow-up and Dispositions    Return in about 1 year (around 9/23/2023). I have reviewed the patient's medical history in detail and updated the computerized patient record. We had a prolonged discussion about these complex clinical issues and went over the various important aspects to consider. All questions were answered. Advised the patient to call back or return to office if symptoms do not improve, change in nature, or persist.     The patient was given an after visit summary or informed of [facility] which includes patient instructions, diagnoses, current medications, & vitals. he expressed understanding with the diagnosis and plan. Rosa Matthew MD    Please note that this dictation was completed with [email or web address], the computer voice recognition software. Quite often unanticipated grammatical, syntax, homophones, and other interpretive errors are inadvertently transcribed by the computer software. Please disregard these errors. Please excuse any errors that have escaped final proofreading.

## 2022-09-23 ENCOUNTER — OFFICE VISIT (OUTPATIENT)
Dept: INTERNAL MEDICINE CLINIC | Age: 72
End: 2022-09-23
Payer: MEDICARE

## 2022-09-23 VITALS
HEART RATE: 61 BPM | RESPIRATION RATE: 16 BRPM | WEIGHT: 176.2 LBS | HEIGHT: 72 IN | BODY MASS INDEX: 23.86 KG/M2 | OXYGEN SATURATION: 97 % | SYSTOLIC BLOOD PRESSURE: 124 MMHG | TEMPERATURE: 98 F | DIASTOLIC BLOOD PRESSURE: 83 MMHG

## 2022-09-23 DIAGNOSIS — F51.04 CHRONIC INSOMNIA: ICD-10-CM

## 2022-09-23 DIAGNOSIS — Z00.00 ROUTINE ADULT HEALTH MAINTENANCE: Primary | ICD-10-CM

## 2022-09-23 DIAGNOSIS — E78.00 HYPERCHOLESTEROLEMIA: ICD-10-CM

## 2022-09-23 DIAGNOSIS — I25.10 CORONARY ARTERY DISEASE INVOLVING NATIVE CORONARY ARTERY OF NATIVE HEART WITHOUT ANGINA PECTORIS: ICD-10-CM

## 2022-09-23 DIAGNOSIS — L40.50 PSORIATIC ARTHRITIS (HCC): ICD-10-CM

## 2022-09-23 PROCEDURE — 99214 OFFICE O/P EST MOD 30 MIN: CPT | Performed by: INTERNAL MEDICINE

## 2022-09-23 PROCEDURE — 1123F ACP DISCUSS/DSCN MKR DOCD: CPT | Performed by: INTERNAL MEDICINE

## 2022-09-23 RX ORDER — ZOLPIDEM TARTRATE 10 MG/1
10 TABLET ORAL
Qty: 90 TABLET | Refills: 1 | Status: SHIPPED | OUTPATIENT
Start: 2022-09-23

## 2022-09-23 NOTE — PROGRESS NOTES
Chief Complaint   Patient presents with    Establish Care       1. \"Have you been to the ER, urgent care clinic since your last visit? Hospitalized since your last visit? \" No    2. \"Have you seen or consulted any other health care providers outside of the [address] [place] [address] since your last visit? \" No     3. For patients aged 39-70: Has the patient had a colonoscopy / FIT/ Cologuard? No      If the patient is female:    4. For patients aged 41-77: Has the patient had a mammogram within the past 2 years? No      5. For patients aged 21-65: Has the patient had a pap smear?  No

## 2022-09-23 NOTE — PATIENT INSTRUCTIONS
Learning About the [address] [facility] Diet  What is the Mediterranean diet? The Mediterranean diet is a style of eating rather than a diet plan. It features foods eaten in [place], [place], [place] and [place], and other countries along the [facility]. It emphasizes eating foods like fish, fruits, vegetables, beans, high-fiber breads and whole grains, nuts, and olive oil. This style of eating includes limited red meat, cheese, and sweets. Why choose the Mediterranean diet? A Mediterranean-style diet may improve heart health. It contains more fat than other heart-healthy diets. But the fats are mainly from nuts, unsaturated oils (such as fish oils and olive oil), and certain nut or seed oils (such as canola, soybean, or flaxseed oil). These fats may help protect the heart and blood vessels. How can you get started on the Mediterranean diet? Here are some things you can do to switch to a more Mediterranean way of eating. What to eat  Eat a variety of fruits and vegetables each day, such as grapes, blueberries, tomatoes, broccoli, peppers, figs, olives, spinach, eggplant, beans, lentils, and chickpeas. Eat a variety of whole-grain foods each day, such as oats, brown rice, and whole wheat bread, pasta, and couscous. Eat fish at least 2 times a week. Try tuna, salmon, mackerel, lake trout, herring, or sardines. Eat moderate amounts of low-fat dairy products, such as milk, cheese, or yogurt. Eat moderate amounts of poultry and eggs. Choose healthy (unsaturated) fats, such as nuts, olive oil, and certain nut or seed oils like canola, soybean, and flaxseed. Limit unhealthy (saturated) fats, such as butter, palm oil, and coconut oil. And limit fats found in animal products, such as meat and dairy products made with whole milk. Try to eat red meat only a few times a month in very small amounts. Limit sweets and desserts to only a few times a week. This includes sugar-sweetened drinks like soda.   The Mediterranean diet may also include red wine with your meal--1 glass each day for women and up to 2 glasses a day for men. Tips for eating at home  Use herbs, spices, garlic, lemon zest, and citrus juice instead of salt to add flavor to foods. Add avocado slices to your sandwich instead of wismean. Have fish for lunch or dinner instead of red meat. Brush the fish with olive oil, and broil or grill it. Sprinkle your salad with seeds or nuts instead of cheese. Cook with olive or canola oil instead of butter or oils that are high in saturated fat. Switch from 2% milk or whole milk to 1% or fat-free milk. Dip raw vegetables in a vinaigrette dressing or hummus instead of dips made from mayonnaise or sour cream.  Have a piece of fruit for dessert instead of a piece of cake. Try baked apples, or have some dried fruit. Tips for eating out  Try broiled, grilled, baked, or poached fish instead of having it fried or breaded. Ask your  to have your meals prepared with olive oil instead of butter. Order dishes made with marinara sauce or sauces made from olive oil. Avoid sauces made from cream or mayonnaise. Choose whole-grain breads, whole wheat pasta and pizza crust, brown rice, beans, and lentils. Cut back on butter or margarine on bread. Instead, you can dip your bread in a small amount of olive oil. Ask for a side salad or grilled vegetables instead of french fries or chips. Where can you learn more? Go to http://www.costa.com/  Enter O407 in the search box to learn more about \"Learning About the Mediterranean Diet. \"  Current as of: September 8, 2021               Content Version: 13.2  © 7961-6354 Healthwise, Incorporated. Care instructions adapted under license by [employer] (which disclaims liability or warranty for this information).  If you have questions about a medical condition or this instruction, always ask your healthcare professional. [employer] disclaims any warranty or liability for your use of this information. Insomnia: Care Instructions  Overview     Insomnia is the inability to sleep well. Insomnia may make it hard for you to get to sleep, stay asleep, or sleep as long as you need to. This can make you tired and grouchy during the day. It can also make you forgetful, less effective at work, and unhappy. Insomnia can be linked to many things. These include health problems, medicines, and stressful events. Treatment may include treating problems that may be linked with your insomnia. Treatment also includes behavior and lifestyle changes. This may include cognitive-behavioral therapy for insomnia (CBT-I). CBT-I uses different ways to help you change your thoughts and behaviors that may interfere with sleep. Your doctor can recommend specific things you can try. Examples include doing relaxation exercises, keeping regular bedtimes and wake times, limiting alcohol, and making healthy sleep habits. Some people decide to take medicine for a while to help with sleep. Follow-up care is a key part of your treatment and safety. Be sure to make and go to all appointments, and call your doctor if you are having problems. It's also a good idea to know your test results and keep a list of the medicines you take. How can you care for yourself at home? Cognitive-behavioral therapy for insomnia (CBT-I)  If your doctor recommends CBT-I, follow your treatment plan. Your doctor will give you instructions that are unique for you. Your plan will likely include a few things that you can try at home. For example:  Try meditation or other relaxation techniques before you go to bed. Go to bed at the same time every night, and wake up at the same time every morning. Do not take naps during the day. Do not stay in bed awake for too long.  If you can't fall asleep, or if you wake up in the middle of the night and can't get back to sleep within about 15 to 20 minutes, get out of bed and go to another room until you feel sleepy. If watching the clock makes you anxious, turn it facing away from you so you cannot see the time. If you worry when you lie down, start a worry book. Well before bedtime, write down your worries, and then set the book and your concerns aside. Healthy sleep habits  If your doctor recommends it, try making healthy sleep habits. For example:  Keep your bedroom quiet, dark, and cool. Do not have drinks with caffeine, such as coffee or black tea, for 8 hours before bed. Do not smoke or use other types of tobacco near bedtime. Nicotine is a stimulant and can keep you awake. Avoid drinking alcohol late in the evening, because it can cause you to wake in the middle of the night. Do not eat a big meal close to bedtime. If you are hungry, eat a light snack. Do not drink a lot of water close to bedtime, because the need to urinate may wake you up during the night. Do not read, watch TV, or use your phone in bed. Use the bed only for sleeping and sex. Medicine  Be safe with medicines. Take your medicines exactly as prescribed. Call your doctor if you think you are having a problem with your medicine. Talk with your doctor before you try an over-the-counter medicine, herbal product, or supplement to try to improve your sleep. Your doctor can recommend how much to take and when to take it. Make sure your doctor knows all of the medicines, vitamins, herbal products, and supplements you take. You will get more details on the specific medicines your doctor prescribes. When should you call for help? Watch closely for changes in your health, and be sure to contact your doctor if:    Your efforts to improve your sleep do not work. Your insomnia gets worse. You have been feeling down, depressed, or hopeless or have lost interest in things that you usually enjoy. Where can you learn more?   Go to http://www.gray.com/  Enter P513 in the search box to learn more about \"Insomnia: Care Instructions. \"  Current as of: June 16, 2021               Content Version: 13.2  © 7201-9892 Healthwise, Incorporated. Care instructions adapted under license by [employer] (which disclaims liability or warranty for this information). If you have questions about a medical condition or this instruction, always ask your healthcare professional. [place] [address] any warranty or liability for your use of this information.

## 2022-09-28 ENCOUNTER — APPOINTMENT (OUTPATIENT)
Dept: INTERNAL MEDICINE CLINIC | Age: 72
End: 2022-09-28

## 2022-09-28 DIAGNOSIS — E78.00 HYPERCHOLESTEROLEMIA: ICD-10-CM

## 2022-09-28 DIAGNOSIS — Z00.00 ROUTINE ADULT HEALTH MAINTENANCE: ICD-10-CM

## 2022-09-28 DIAGNOSIS — L40.50 PSORIATIC ARTHRITIS (HCC): ICD-10-CM

## 2022-09-29 LAB
ALBUMIN SERPL-MCNC: 4.4 G/DL (ref 3.5–5)
ALBUMIN/GLOB SERPL: 1.6 {RATIO} (ref 1.1–2.2)
ALP SERPL-CCNC: 116 U/L (ref 45–117)
ALT SERPL-CCNC: 31 U/L (ref 12–78)
ANION GAP SERPL CALC-SCNC: 3 MMOL/L (ref 5–15)
AST SERPL-CCNC: 18 U/L (ref 15–37)
BASOPHILS # BLD: 0.1 K/UL (ref 0–0.1)
BASOPHILS NFR BLD: 1 % (ref 0–1)
BILIRUB SERPL-MCNC: 1.3 MG/DL (ref 0.2–1)
BUN SERPL-MCNC: 15 MG/DL (ref 6–20)
BUN/CREAT SERPL: 16 (ref 12–20)
CALCIUM SERPL-MCNC: 9.5 MG/DL (ref 8.5–10.1)
CHLORIDE SERPL-SCNC: 109 MMOL/L (ref 97–108)
CHOLEST SERPL-MCNC: 122 MG/DL
CO2 SERPL-SCNC: 29 MMOL/L (ref 21–32)
CREAT SERPL-MCNC: 0.96 MG/DL (ref 0.7–1.3)
CREAT UR-MCNC: 197 MG/DL
DIFFERENTIAL METHOD BLD: NORMAL
EOSINOPHIL # BLD: 0.1 K/UL (ref 0–0.4)
EOSINOPHIL NFR BLD: 2 % (ref 0–7)
ERYTHROCYTE [DISTWIDTH] IN BLOOD BY AUTOMATED COUNT: 13 % (ref 11.5–14.5)
GLOBULIN SER CALC-MCNC: 2.8 G/DL (ref 2–4)
GLUCOSE SERPL-MCNC: 97 MG/DL (ref 65–100)
HCT VFR BLD AUTO: 42.9 % (ref 36.6–50.3)
HDLC SERPL-MCNC: 52 MG/DL
HDLC SERPL: 2.3 {RATIO} (ref 0–5)
HGB BLD-MCNC: 14.1 G/DL (ref 12.1–17)
IMM GRANULOCYTES # BLD AUTO: 0 K/UL (ref 0–0.04)
IMM GRANULOCYTES NFR BLD AUTO: 0 % (ref 0–0.5)
LDLC SERPL CALC-MCNC: 54.8 MG/DL (ref 0–100)
LYMPHOCYTES # BLD: 1.5 K/UL (ref 0.8–3.5)
LYMPHOCYTES NFR BLD: 32 % (ref 12–49)
MCH RBC QN AUTO: 29.6 PG (ref 26–34)
MCHC RBC AUTO-ENTMCNC: 32.9 G/DL (ref 30–36.5)
MCV RBC AUTO: 90.1 FL (ref 80–99)
MICROALBUMIN UR-MCNC: 2.51 MG/DL
MICROALBUMIN/CREAT UR-RTO: 13 MG/G (ref 0–30)
MONOCYTES # BLD: 0.4 K/UL (ref 0–1)
MONOCYTES NFR BLD: 9 % (ref 5–13)
NEUTS SEG # BLD: 2.6 K/UL (ref 1.8–8)
NEUTS SEG NFR BLD: 56 % (ref 32–75)
NRBC # BLD: 0 K/UL (ref 0–0.01)
NRBC BLD-RTO: 0 PER 100 WBC
PLATELET # BLD AUTO: 184 K/UL (ref 150–400)
PMV BLD AUTO: 11.8 FL (ref 8.9–12.9)
POTASSIUM SERPL-SCNC: 4.7 MMOL/L (ref 3.5–5.1)
PROT SERPL-MCNC: 7.2 G/DL (ref 6.4–8.2)
RBC # BLD AUTO: 4.76 M/UL (ref 4.1–5.7)
SODIUM SERPL-SCNC: 141 MMOL/L (ref 136–145)
TRIGL SERPL-MCNC: 76 MG/DL (ref ?–150)
VLDLC SERPL CALC-MCNC: 15.2 MG/DL
WBC # BLD AUTO: 4.6 K/UL (ref 4.1–11.1)

## 2023-04-24 ENCOUNTER — TELEPHONE (OUTPATIENT)
Dept: INTERNAL MEDICINE CLINIC | Age: 73
End: 2023-04-24

## 2023-04-24 ENCOUNTER — TELEPHONE (OUTPATIENT)
Facility: CLINIC | Age: 73
End: 2023-04-24

## 2023-04-24 NOTE — TELEPHONE ENCOUNTER
----- Message from Steven Estrada sent at 4/24/2023  8:38 AM EDT -----  Subject: Message to Provider    QUESTIONS  Information for Provider? patient called and has questions about about an   Aug 1st appointment for his brother, stating that the provider leaving,   and he also has same provider and would like some help with knowing what   to do about upcoming appointment   ---------------------------------------------------------------------------  --------------  [address] [facility]  [phone]; OK to leave message on voicemail  ---------------------------------------------------------------------------  --------------  SCRIPT ANSWERS  Relationship to Patient?  Self

## 2023-04-28 ENCOUNTER — HOSPITAL ENCOUNTER (OUTPATIENT)
Dept: CT IMAGING | Age: 73
Discharge: HOME | End: 2023-04-28
Attending: INTERNAL MEDICINE

## 2023-04-28 ENCOUNTER — OFFICE VISIT (OUTPATIENT)
Dept: INTERNAL MEDICINE CLINIC | Age: 73
End: 2023-04-28

## 2023-04-28 VITALS
HEART RATE: 68 BPM | DIASTOLIC BLOOD PRESSURE: 78 MMHG | OXYGEN SATURATION: 98 % | SYSTOLIC BLOOD PRESSURE: 118 MMHG | BODY MASS INDEX: 23.77 KG/M2 | WEIGHT: 175.5 LBS | TEMPERATURE: 97.7 F | HEIGHT: 72 IN | RESPIRATION RATE: 16 BRPM

## 2023-04-28 DIAGNOSIS — L40.50 PSORIATIC ARTHRITIS (HCC): ICD-10-CM

## 2023-04-28 DIAGNOSIS — R51.9 OCCIPITAL HEADACHE: ICD-10-CM

## 2023-04-28 DIAGNOSIS — Z79.899 IMMUNOSUPPRESSION DUE TO DRUG THERAPY (HCC): ICD-10-CM

## 2023-04-28 DIAGNOSIS — R51.9 OCCIPITAL HEADACHE: Primary | ICD-10-CM

## 2023-04-28 DIAGNOSIS — D84.821 IMMUNOSUPPRESSION DUE TO DRUG THERAPY (HCC): ICD-10-CM

## 2023-04-28 NOTE — PROGRESS NOTES
Chief Complaint   Patient presents with    Headache   Visit Vitals  /78 (BP 1 Location: Right arm, BP Patient Position: Sitting, BP Cuff Size: Adult)   Pulse 68   Temp 97.7 °F (36.5 °C) (Oral)   Resp 16   Ht 6' (1.829 m)   Wt 175 lb 8 oz (79.6 kg)   SpO2 98%   BMI 23.80 kg/m²         1. \"Have you been to the ER, urgent care clinic since your last visit? Hospitalized since your last visit? \" No    2. \"Have you seen or consulted any other health care providers outside of the [address] [place] [address] since your last visit? \" No     3. For patients aged 39-70: Has the patient had a colonoscopy / FIT/ Cologuard? No      If the patient is female:    4. For patients aged 41-77: Has the patient had a mammogram within the past 2 years? No      5. For patients aged 21-65: Has the patient had a pap smear?  No

## 2023-04-28 NOTE — PROGRESS NOTES
ICD-10-CM ICD-9-CM    1. Occipital headache  R51.9 784.0 CTA HEAD      SED RATE (ESR)      CBC WITH AUTOMATED DIFF      METABOLIC PANEL, COMPREHENSIVE      2. Immunosuppression due to drug therapy ([facility]  D84.821 V58.69     Z79.899        3. Psoriatic arthritis ([facility]  L40.50 696.0                Subjective:     Chief Complaint   Patient presents with    Headache       Mook Jimenez is a 74 y.o. M.  he  has a past medical history of BPH with obstruction/lower urinary tract symptoms (03/23/2018), Chronic insomnia (03/23/2018), Chronic prescription opiate use, Coronary artery disease involving native coronary artery (03/23/2018), Former smoker, stopped smoking in distant past, History of elevated PSA (03/23/2018), History of immunosuppressive therapy, History of left heart catheterization (05/2015), Hypercholesterolemia (03/23/2018), Primary open angle glaucoma of right eye (03/23/2018), Psoriatic arthritis ([facility] and Vitamin D deficiency. his last appointment in this clinic was 9/28/2022 . I reviewed and updated the medical record. This is a 18-year-old white male with a past medical history of coronary artery disease, psoriatic arthritis currently on immunosuppressive therapy, as well as hypertension and hyperlipidemia who presents for an acute complaint of a right-sided occipital headache. The patient says that he has had for the past 5 days and occipital headache, which he locates to the right posterior portion of his neck and head. This has been fairly constant over the past several days, and has had only mild relief with the use of NSAIDs. He has not had any photophobia or phonophobia during this time and furthermore denies having had any fevers or chills or constitutional systemic complaints. He denies having had any focal neurological symptoms during his time to include any visual disturbances, focal weakness or numbness or tingling, or dysarthria.   He denies having had any changes in speech or swallowing. He describes the pain as a dull ache, without throbbing or other particular qualities, and notes that it tends to worsen when he flexes his head towards his chest.  Of note, the patient had 2 days prior to the onset of symptoms a neck manipulation performed by our doctor. He has had these multiple times in the past and has not had a problem with them before. He continues on methotrexate and secukinumab for his early arthritis. His review of systems otherwise negative. Routine Healthcare Maintenance issues are reviewed and discussed with the patient as noted below. Orders to update gaps in healthcare maintenance were placed as noted below in the Assessment and Plan, where applicable. Past Medical History:  Past Medical History:   Diagnosis Date    BPH with obstruction/lower urinary tract symptoms 03/23/2018    Chronic insomnia 03/23/2018    Chronic prescription opiate use     Coronary artery disease involving native coronary artery 03/23/2018    Status post stents ×3    Former smoker, stopped smoking in distant past     History of elevated PSA 03/23/2018    Status post biopsy. Atypical small acinar proliferation    History of immunosuppressive therapy     History of left heart catheterization 05/2015    1. Significant 3-vessel coronary artery disease. 2. Well-preserved left ventricular systolic function. 3. Normal left ventricular end-diastolic pressure.     Hypercholesterolemia 03/23/2018    Primary open angle glaucoma of right eye 03/23/2018    Psoriatic arthritis ([facility]     Dr. Gladis Rene (Rheumatology) following - RX = Cosentyx + MTX    Vitamin D deficiency        Past Surgical Histor:  Past Surgical History:   Procedure Laterality Date    COLONOSCOPY N/A 12/13/2018    COLONOSCOPY performed by Stacie Jean Baptiste MD at [facility] ENDOSCOPY    HX HEART CATHETERIZATION  05/2015    HX HERNIA REPAIR  05/29/2019    Open repair of right inguinal hernia with mesh     HX KNEE ARTHROSCOPY Bilateral     HX ORTHOPAEDIC Right     Cubital tunnel sx    IR INJ FORAMIN EPID LUMB ANES/STER ADDL  2019    IR INJ FORAMIN EPID LUMB ANES/STER SNGL  2019       Allergies:  No Known Allergies    Medications:  Current Outpatient Medications   Medication Sig Dispense Refill    zolpidem (AMBIEN) 10 mg tablet Take 1 Tablet by mouth nightly. Max Daily Amount: 10 mg. 90 Tablet 1    oxyCODONE-acetaminophen (PERCOCET 7.5) 7.5-325 mg per tablet Take  by mouth every six (6) hours as needed for Pain.      naloxone (NARCAN) 4 mg/actuation nasal spray Use 1 spray intranasally, then discard. Repeat with new spray every 2 min as needed for opioid overdose symptoms, alternating nostrils. 2 Each 0    latanoprost (XALATAN) 0.005 % ophthalmic solution       cholecalciferol (VITAMIN D3) (5000 Units/125 mcg) tab tablet Take 1 Tablet by mouth daily. ascorbic acid, vitamin C, (VITAMIN C) 500 mg tablet Take 2 Tablets by mouth daily. secukinumab 150 mg/mL pnij by SubCUTAneous route every thirty (30) days. atorvastatin (LIPITOR) 20 mg tablet Take 1 Tab by mouth nightly. 30 Tab 6    aspirin delayed-release 81 mg tablet Take  by mouth daily. doxazosin (CARDURA) 4 mg tablet Take 1 Tablet by mouth nightly. Indications: enlarged prostate      oxybutynin chloride XL 10 mg CR tablet Take 1 Tablet by mouth nightly. finasteride (PROSCAR) 5 mg tablet Take 1 Tablet by mouth nightly. folic acid (FOLVITE) 1 mg tablet Take 1 Tablet by mouth daily. methotrexate (RHEUMATREX) 2.5 mg tablet Take 6 Tablets by mouth every . Social History:  Social History     Socioeconomic History    Marital status:    Tobacco Use    Smoking status: Former     Packs/day: 0.25     Years: 2.00     Pack years: 0.50     Types: Cigarettes     Quit date: 1972     Years since quittin.3    Smokeless tobacco: Never   Vaping Use    Vaping Use: Never used   Substance and Sexual Activity    Alcohol use: No    Drug use:  No Social Determinants of Health     Physical Activity: Unknown    Days of Exercise per Week: Patient refused    Minutes of Exercise per Session: 0 min       Family History:  Family History   Problem Relation Age of Onset    Bleeding Prob Mother     No Known Problems Father     Cancer Sister         rectal    Heart Disease Brother     Hypertension Brother        Immunizations:  Immunization History   Administered Date(s) Administered    COVID-19, MODERNA BLUE border, Primary or Immunocompromised, (age 18y+), IM, 100 mcg/0.5mL 03/10/2021, 04/09/2021    COVID-19, MODERNA Booster BLUE border, (age 18y+), IM, 50mcg/0.25mL 11/19/2021    Influenza High Dose Vaccine PF 11/01/2021    Influenza Vaccine 12/01/2017, 10/01/2018    Influenza Vaccine (Tri) Adjuvanted (>65 Yrs FLUAD TRI [id number]) 10/04/2019    Influenza, FLUAD, (age 72 y+), Adjuvanted 10/28/2020    Zoster Recombinant 09/21/2021, 12/07/2021        Healthcare Maintenance:  Health Maintenance   Topic Date Due    Pneumococcal 65+ years (1 - PCV) Never done    DTaP/Tdap/Td series (1 - Tdap) Never done    COVID-19 Vaccine (3 - Moderna risk series) 12/17/2021    Medicare Yearly Exam  03/23/2023    Flu Vaccine (Season Ended) 08/01/2023    Depression Screen  09/23/2023    Lipid Screen  09/28/2023    Colorectal Cancer Screening Combo  12/13/2028    Hepatitis C Screening  Completed    AAA Screening 73-67 YO Male Smoking Patients  Completed    Shingles Vaccine  Completed        Review of Systems:  ROS:  Review of Systems   Constitutional: Negative. HENT: Negative. Eyes: Negative. Negative for photophobia and pain. Respiratory: Negative. Cardiovascular: Negative. Gastrointestinal: Negative. Genitourinary: Negative. Musculoskeletal: Negative. Skin: Negative. Neurological:  Positive for headaches. Negative for dizziness, tingling, tremors, sensory change, speech change, focal weakness, seizures, loss of consciousness and weakness. Endo/Heme/Allergies: Negative. Psychiatric/Behavioral: Negative. ROS otherwise negative      Objective:     Vital Signs:  Visit Vitals  /78 (BP 1 Location: Right arm, BP Patient Position: Sitting, BP Cuff Size: Adult)   Pulse 68   Temp 97.7 °F (36.5 °C) (Oral)   Resp 16   Ht 6' (1.829 m)   Wt 175 lb 8 oz (79.6 kg)   SpO2 98%   BMI 23.80 kg/m²       BMI:  Body mass index is 23.8 kg/m². Physical Examination:  Physical Exam  Vitals reviewed. Constitutional:       Appearance: Normal appearance. HENT:      Head: Normocephalic and atraumatic. Nose: Nose normal.      Mouth/Throat:      Mouth: Mucous membranes are moist.   Eyes:      Extraocular Movements: Extraocular movements intact. Conjunctiva/sclera: Conjunctivae normal.      Pupils: Pupils are equal, round, and reactive to light. Cardiovascular:      Rate and Rhythm: Normal rate and regular rhythm. Pulses: Normal pulses. Heart sounds: Normal heart sounds. No murmur heard. No friction rub. No gallop. Pulmonary:      Effort: Pulmonary effort is normal. No respiratory distress. Breath sounds: Normal breath sounds. No wheezing, rhonchi or rales. Abdominal:      General: Bowel sounds are normal. There is no distension. Palpations: Abdomen is soft. There is no mass. Tenderness: There is no abdominal tenderness. There is no guarding or rebound. Musculoskeletal:         General: No tenderness, deformity or signs of injury. Normal range of motion. Cervical back: Normal range of motion and neck supple. Right lower leg: No edema. Left lower leg: No edema. Skin:     General: Skin is warm and dry. Findings: No bruising, lesion or rash. Neurological:      General: No focal deficit present. Mental Status: He is alert and oriented to person, place, and time. Mental status is at baseline. Cranial Nerves: No cranial nerve deficit. Sensory: No sensory deficit.       Motor: No weakness. Coordination: Coordination normal.      Gait: Gait normal.      Deep Tendon Reflexes: Reflexes normal.   Psychiatric:         Mood and Affect: Mood normal.         Behavior: Behavior normal.        Physical exam otherwise negative    Diagnostic Testing:    Laboratory Studies:  No visits with results within 6 Month(s) from this visit. Latest known visit with results is:   Appointment on 09/28/2022   Component Date Value Ref Range Status    Cholesterol, total 09/28/2022 122  <200 MG/DL Final    Triglyceride 09/28/2022 76  <150 MG/DL Final    Based on NCEP-ATP III:  Triglycerides <150 mg/dL  is considered normal, 150-199 mg/dL  borderline high,  200-499 mg/dL high and  greater than or equal to 500 mg/dL very high. HDL Cholesterol 09/28/2022 52  MG/DL Final    Based on NCEP ATP III, HDL Cholesterol <40 mg/dL is considered low and >60 mg/dL is elevated.     LDL, calculated 09/28/2022 54.8  0 - 100 MG/DL Final    Comment: Based on the NCEP-ATP: LDL-C concentrations are considered  optimal <100 mg/dL,  near optimal/above Normal 100-129 mg/dL  Borderline High: 130-159, High: 160-189 mg/dL  Very High: Greater than or equal to 190 mg/dL      VLDL, calculated 09/28/2022 15.2  MG/DL Final    CHOL/HDL Ratio 09/28/2022 2.3  0.0 - 5.0   Final    Sodium 09/28/2022 141  136 - 145 mmol/L Final    Potassium 09/28/2022 4.7  3.5 - 5.1 mmol/L Final    Chloride 09/28/2022 109 (H)  97 - 108 mmol/L Final    CO2 09/28/2022 29  21 - 32 mmol/L Final    Anion gap 09/28/2022 3 (L)  5 - 15 mmol/L Final    Glucose 09/28/2022 97  65 - 100 mg/dL Final    BUN 09/28/2022 15  6 - 20 MG/DL Final    Creatinine 09/28/2022 0.96  0.70 - 1.30 MG/DL Final    BUN/Creatinine ratio 09/28/2022 16  12 - 20   Final    GFR est AA 09/28/2022 >60  >60 ml/min/1.73m2 Final    GFR est non-AA 09/28/2022 >60  >60 ml/min/1.73m2 Final    Estimated GFR is calculated using the IDMS-traceable Modification of Diet in Renal Disease (MDRD) Study equation, reported for both  Americans (GFRAA) and non- Americans (GFRNA), and normalized to 1.73m2 body surface area. The physician must decide which value applies to the patient. Calcium 09/28/2022 9.5  8.5 - 10.1 MG/DL Final    Bilirubin, total 09/28/2022 1.3 (H)  0.2 - 1.0 MG/DL Final    ALT (SGPT) 09/28/2022 31  12 - 78 U/L Final    AST (SGOT) 09/28/2022 18  15 - 37 U/L Final    Alk. phosphatase 09/28/2022 116  45 - 117 U/L Final    Protein, total 09/28/2022 7.2  6.4 - 8.2 g/dL Final    Albumin 09/28/2022 4.4  3.5 - 5.0 g/dL Final    Globulin 09/28/2022 2.8  2.0 - 4.0 g/dL Final    A-G Ratio 09/28/2022 1.6  1.1 - 2.2   Final    WBC 09/28/2022 4.6  4.1 - 11.1 K/uL Final    RBC 09/28/2022 4.76  4.10 - 5.70 M/uL Final    HGB 09/28/2022 14.1  12.1 - 17.0 g/dL Final    HCT 09/28/2022 42.9  36.6 - 50.3 % Final    MCV 09/28/2022 90.1  80.0 - 99.0 FL Final    MCH 09/28/2022 29.6  26.0 - 34.0 PG Final    MCHC 09/28/2022 32.9  30.0 - 36.5 g/dL Final    RDW 09/28/2022 13.0  11.5 - 14.5 % Final    PLATELET 90/83/5680 582  150 - 400 K/uL Final    MPV 09/28/2022 11.8  8.9 - 12.9 FL Final    NRBC 09/28/2022 0.0  0  WBC Final    ABSOLUTE NRBC 09/28/2022 0.00  0.00 - 0.01 K/uL Final    NEUTROPHILS 09/28/2022 56  32 - 75 % Final    LYMPHOCYTES 09/28/2022 32  12 - 49 % Final    MONOCYTES 09/28/2022 9  5 - 13 % Final    EOSINOPHILS 09/28/2022 2  0 - 7 % Final    BASOPHILS 09/28/2022 1  0 - 1 % Final    IMMATURE GRANULOCYTES 09/28/2022 0  0.0 - 0.5 % Final    ABS. NEUTROPHILS 09/28/2022 2.6  1.8 - 8.0 K/UL Final    ABS. LYMPHOCYTES 09/28/2022 1.5  0.8 - 3.5 K/UL Final    ABS. MONOCYTES 09/28/2022 0.4  0.0 - 1.0 K/UL Final    ABS. EOSINOPHILS 09/28/2022 0.1  0.0 - 0.4 K/UL Final    ABS. BASOPHILS 09/28/2022 0.1  0.0 - 0.1 K/UL Final    ABS. IMM.  GRANS. 09/28/2022 0.0  0.00 - 0.04 K/UL Final    DF 09/28/2022 AUTOMATED    Final    Microalbumin,urine random 09/28/2022 2.51  MG/DL Final    No reference range has been established. Creatinine, urine random 09/28/2022 197.00  mg/dL Final    No reference range has been established. Microalbumin/Creat ratio (mg/g cre* 09/28/2022 13  0 - 30 mg/g Final         Radiographic Studies:  XR Results (most recent):  Results from [place] encounter on 07/17/19    XR SPINE THORAC 3 V    Narrative  EXAM:  XR SPINE THORAC 3 V    INDICATION: Mid back pain for several weeks    COMPARISON: 5/4/2019. TECHNIQUE: 3 views thoracic spine    FINDINGS: There is no acute fracture or subluxation. Vertebral body heights are  maintained. There is stable mild diffuse degenerative change. There is no  abnormality in alignment. Impression  IMPRESSION: No acute abnormality. Stable mild diffuse degenerative changes. HEMAL Results (most recent):  No results found for this or any previous visit. CT Results (most recent):  Results from Hospital Encounter encounter on 07/17/19    CTA CHEST W OR W WO CONT    Narrative  EXAM:  CT angiography chest    INDICATION: Pleuritic chest pain. Thoracic spine tenderness to palpation. COMPARISON: Radiographs 7/17/2019. CT ABD pelvis 5/4/2019. TECHNIQUE: Helical thin section chest CT following uneventful intravenous  administration of nonionic contrast according to departmental PE protocol. Coronal and sagittal reformats were performed. 3D/MIP post processing was  performed. CT dose reduction was achieved through use of a standardized protocol  tailored for this examination and automatic exposure control for dose  modulation. FINDINGS: This is a good quality study for the evaluation of pulmonary embolism  to the first subsegmental arterial level. There is no pulmonary embolism to this  level. The visualized thyroid gland is unremarkable. The aorta and main pulmonary  artery are normal in caliber. Cardiac size is within normal limits. No  pericardial effusion. No lymphadenopathy by imaging size criteria. The lungs are clear.  No pleural effusion or pneumothorax. Central airways are  unremarkable. Low density liver lesions previously seen to represent hemangiomas are  unchanged. There is no acute abnormality in the visualized upper abdomen. There is a partially visualized mixed sclerotic and lytic bony lesion involving  the anterior and posterior elements of the L1 vertebral body. This is unchanged  dating back to 9/9/2009. No other bony lesion is identified. Impression  IMPRESSION:  1. No acute pulmonary embolism or other acute abnormality in the chest.    2. Stable low-density liver lesions previously seen to represent hemangiomas. 3. A partially visualized mixed sclerotic and lytic lesion in the L1 vertebral  body is unchanged dating back to 2009 and therefore benign. No other bony lesion  or acute fracture. DEXA Results (most recent):  No results found for this or any previous visit. MRI Results (most recent):  Results from [place] encounter on 03/20/19    MRI LUMB SPINE WO CONT    Narrative  EXAM: MRI LUMB SPINE WO CONT    INDICATION: Sciatica, left side    COMPARISON: November 2011    TECHNIQUE: MR imaging of the lumbar spine was performed using the following  sequences: sagittal T1, T2, STIR;  axial T1, T2.    CONTRAST:  None. FINDINGS:    Alignment of the lumbar spine is stable. The conus medullaris terminates at T12. Vertebral body heights are normal. Marrow signal is heterogeneous at L1 due to a  hemangioma. No fracture. Paraspinal tissues are within normal limits. Simple  cyst at the lower pole of the right kidney. Lower thoracic spine: No herniation or stenosis. L1-L2: There is enlargement of the L1 vertebral body with heterogeneous signal  likely due to a hemangioma. There is disc height loss at L1-2 with a disc  osteophyte protrusion posteriorly narrowing the subarticular zone and the neural  foramen bilaterally. Moderate neural foraminal narrowing bilaterally. No canal  stenosis.     L2-L3: Mild diffuse disc bulge extending into the foramen bilaterally with mild  foraminal narrowing. No canal stenosis. L3-L4: Moderate facet arthropathy and ligamentum flavum thickening. Foraminal  disc bulges bilaterally resulting in mild foraminal stenosis. No canal stenosis. L4-L5: Moderate facet arthropathy and ligamentum flavum thickening. Diffuse disc  bulge with small foraminal components resulting in mild foraminal stenosis. No  canal stenosis. L5-S1: Mild facet arthropathy and ligamentum flavum thickening. No foraminal or  canal narrowing. Impression  IMPRESSION:  1. Degenerative changes at L1-2 resulting in moderate foraminal narrowing  bilaterally. 2.  Mild foraminal narrowing at L2-3, L3-4, and L4-5 as a result of foraminal  disc bulges. Assessment/Plan:       ICD-10-CM ICD-9-CM    1. Occipital headache  R51.9 784.0 CTA HEAD      SED RATE (ESR)      CBC WITH AUTOMATED DIFF      METABOLIC PANEL, COMPREHENSIVE      2. Immunosuppression due to drug therapy ([facility]  D84.821 V58.69     Z79.899        3. Psoriatic arthritis ([facility]  L40.50 696.0              This patient presents with acute complaint of right-sided occipital headache. He is noted to be using immunosuppressive therapy. Significant note, the patient had undergone a rapid neck manipulation by a chiropractor 2 days prior to the onset of his symptoms. The differential diagnosis for this would include cervicogenic headache, or tension type headache, which remain the most likely cause of his symptoms. However, the recent neck manipulation raises the possibility of a vertebral artery dissection. This needs to be formally ruled out with CT angiography. Other  potential infectious causes such as viral meningitis or cryptococcus or other infectious disease processes seem less likely given the subacute onset and the patient's nontoxic appearance. He is also not had any systemic symptoms otherwise to suggest this.   Giant cell arteritis would still be a consideration despite the somewhat atypical location, and an ESR would be fairly simple to obtain to rule this out. For now, deferring additional pharmacotherapy pending the completion of his CT angiography, and receipt of her labs. If CT of the head is unremarkable, then I would feel more confident in calling this a simple cervicogenic headache. At that point, conservative therapy would probably be all that would be necessary. Consider possibility of occipital neuralgia but is symptoms otherwise appear to be consistent with this. Return precautions provided to patient. Awaiting CT angiography. I have reviewed the patient's medical history in detail and updated the computerized patient record. We had a prolonged discussion about these complex clinical issues and went over the various important aspects to consider. All questions were answered. Advised the patient to call back or return to office if symptoms do not improve, change in nature, or persist.     The patient was given an after visit summary or informed of [employer] [facility] which includes patient instructions, diagnoses, current medications, & vitals. he expressed understanding with the diagnosis and plan. Carli Simpson MD    Please note that this dictation was completed with [employer], the computer voice recognition software. Quite often unanticipated grammatical, syntax, homophones, and other interpretive errors are inadvertently transcribed by the computer software. Please disregard these errors. Please excuse any errors that have escaped final proofreading.

## 2023-04-29 LAB
ALBUMIN SERPL-MCNC: 4.2 G/DL (ref 3.5–5)
ALBUMIN/GLOB SERPL: 1.5 (ref 1.1–2.2)
ALP SERPL-CCNC: 104 U/L (ref 45–117)
ALT SERPL-CCNC: 25 U/L (ref 12–78)
ANION GAP SERPL CALC-SCNC: 1 MMOL/L (ref 5–15)
AST SERPL-CCNC: 15 U/L (ref 15–37)
BASOPHILS # BLD: 0.1 K/UL (ref 0–0.1)
BASOPHILS NFR BLD: 1 % (ref 0–1)
BILIRUB SERPL-MCNC: 1.1 MG/DL (ref 0.2–1)
BUN SERPL-MCNC: 18 MG/DL (ref 6–20)
BUN/CREAT SERPL: 16 (ref 12–20)
CALCIUM SERPL-MCNC: 9.5 MG/DL (ref 8.5–10.1)
CHLORIDE SERPL-SCNC: 105 MMOL/L (ref 97–108)
CO2 SERPL-SCNC: 30 MMOL/L (ref 21–32)
CREAT SERPL-MCNC: 1.16 MG/DL (ref 0.7–1.3)
DIFFERENTIAL METHOD BLD: NORMAL
EOSINOPHIL # BLD: 0.1 K/UL (ref 0–0.4)
EOSINOPHIL NFR BLD: 1 % (ref 0–7)
ERYTHROCYTE [DISTWIDTH] IN BLOOD BY AUTOMATED COUNT: 12.7 % (ref 11.5–14.5)
ERYTHROCYTE [SEDIMENTATION RATE] IN BLOOD: 6 MM/HR (ref 0–20)
GLOBULIN SER CALC-MCNC: 2.8 G/DL (ref 2–4)
GLUCOSE SERPL-MCNC: 95 MG/DL (ref 65–100)
HCT VFR BLD AUTO: 40.6 % (ref 36.6–50.3)
HGB BLD-MCNC: 13.4 G/DL (ref 12.1–17)
IMM GRANULOCYTES # BLD AUTO: 0 K/UL (ref 0–0.04)
IMM GRANULOCYTES NFR BLD AUTO: 0 % (ref 0–0.5)
LYMPHOCYTES # BLD: 1.8 K/UL (ref 0.8–3.5)
LYMPHOCYTES NFR BLD: 37 % (ref 12–49)
MCH RBC QN AUTO: 29.7 PG (ref 26–34)
MCHC RBC AUTO-ENTMCNC: 33 G/DL (ref 30–36.5)
MCV RBC AUTO: 90 FL (ref 80–99)
MONOCYTES # BLD: 0.5 K/UL (ref 0–1)
MONOCYTES NFR BLD: 10 % (ref 5–13)
NEUTS SEG # BLD: 2.5 K/UL (ref 1.8–8)
NEUTS SEG NFR BLD: 51 % (ref 32–75)
NRBC # BLD: 0 K/UL (ref 0–0.01)
NRBC BLD-RTO: 0 PER 100 WBC
PLATELET # BLD AUTO: 200 K/UL (ref 150–400)
PMV BLD AUTO: 11.6 FL (ref 8.9–12.9)
POTASSIUM SERPL-SCNC: 4.6 MMOL/L (ref 3.5–5.1)
PROT SERPL-MCNC: 7 G/DL (ref 6.4–8.2)
RBC # BLD AUTO: 4.51 M/UL (ref 4.1–5.7)
SODIUM SERPL-SCNC: 136 MMOL/L (ref 136–145)
WBC # BLD AUTO: 4.9 K/UL (ref 4.1–11.1)

## 2023-05-01 ENCOUNTER — HOSPITAL ENCOUNTER (OUTPATIENT)
Dept: CT IMAGING | Age: 73
Discharge: HOME | End: 2023-05-01
Attending: INTERNAL MEDICINE
Payer: MEDICARE

## 2023-05-01 ENCOUNTER — TRANSCRIBE ORDER (OUTPATIENT)
Dept: SCHEDULING | Age: 73
End: 2023-05-01

## 2023-05-01 DIAGNOSIS — R51.9 OCCIPITAL HEADACHE: Primary | ICD-10-CM

## 2023-05-01 DIAGNOSIS — R51.9 FACIAL PAIN: Primary | ICD-10-CM

## 2023-05-01 DIAGNOSIS — R51.9 FACIAL PAIN: ICD-10-CM

## 2023-05-01 PROCEDURE — 70498 CT ANGIOGRAPHY NECK: CPT

## 2023-05-01 PROCEDURE — 74011000636 HC RX REV CODE- 636: Performed by: INTERNAL MEDICINE

## 2023-05-01 RX ADMIN — IOPAMIDOL 100 ML: 755 INJECTION, SOLUTION INTRAVENOUS at 12:36

## 2023-05-04 DIAGNOSIS — F51.04 CHRONIC INSOMNIA: ICD-10-CM

## 2023-05-04 RX ORDER — ZOLPIDEM TARTRATE 10 MG/1
TABLET ORAL
Qty: 90 TABLET | Refills: 1 | Status: SHIPPED | OUTPATIENT
Start: 2023-05-04

## 2023-10-10 RX ORDER — ZOLPIDEM TARTRATE 10 MG/1
10 TABLET ORAL NIGHTLY PRN
OUTPATIENT
Start: 2023-10-10

## 2023-10-10 NOTE — TELEPHONE ENCOUNTER
PCP: Kyle Rome MD    Last appt: 4/28/2023  No future appointments. Requested Prescriptions     Pending Prescriptions Disp Refills    zolpidem (AMBIEN) 10 MG tablet       Sig: Take 1 tablet by mouth nightly as needed for Sleep.  Max Daily Amount: 10 mg       Prior labs and Blood pressures:  BP Readings from Last 3 Encounters:   04/28/23 118/78   09/23/22 124/83   03/22/22 122/70     Lab Results   Component Value Date/Time     04/28/2023 03:15 PM    K 4.6 04/28/2023 03:15 PM     04/28/2023 03:15 PM    CO2 30 04/28/2023 03:15 PM    BUN 18 04/28/2023 03:15 PM    GFRAA >60 09/28/2022 08:46 AM     No results found for: \"HBA1C\", \"SAQ8OLCS\"  Lab Results   Component Value Date/Time    CHOL 122 09/28/2022 08:46 AM    HDL 52 09/28/2022 08:46 AM     No results found for: \"VITD3\", \"VD3RIA\"        No results found for: \"TSH\", \"TSH2\", \"TSH3\"

## 2023-10-10 NOTE — TELEPHONE ENCOUNTER
----- Message from Jamila Pittman sent at 10/10/2023  9:29 AM EDT -----  Subject: Refill Request    QUESTIONS  Name of Medication? zolpidem (AMBIEN) 10 MG tablet  Patient-reported dosage and instructions? one 10 MG tablet, daily  How many days do you have left? 20  Preferred Pharmacy? [address] phone number (if available)? [phone]  Additional Information for Provider? Previous pt of Dr. Ana Cristina England. Waiting on   us to call to reschedule appt.   ---------------------------------------------------------------------------  --------------  CALL BACK INFO  What is the best way for the office to contact you? OK to leave message on   voicemail  Preferred Call Back Phone Number? [phone]  ---------------------------------------------------------------------------  --------------  SCRIPT ANSWERS  Relationship to Patient?  Self

## 2023-10-18 ENCOUNTER — TELEPHONE (OUTPATIENT)
Age: 73
End: 2023-10-18

## 2023-10-18 NOTE — TELEPHONE ENCOUNTER
----- Message from Laxmi [place], [place] sent at 10/18/2023  1:55 PM EDT -----  Subject: Appointment Request    Reason for Call: New Patient/New to Provider Appointment needed: New   Patient Request Appointment    QUESTIONS    Reason for appointment request? No appointments available during search     Additional Information for Provider? pt calling in to schedule a NTP/est   care appt, please schedule and call back with appt, thanks!  ---------------------------------------------------------------------------  --------------  [address] [place]  [phone]; OK to leave message on voicemail  ---------------------------------------------------------------------------  --------------  SCRIPT ANSWERS

## 2023-11-07 DIAGNOSIS — F51.04 PSYCHOPHYSIOLOGIC INSOMNIA: Primary | ICD-10-CM

## 2023-11-07 RX ORDER — ZOLPIDEM TARTRATE 10 MG/1
10 TABLET ORAL NIGHTLY PRN
Qty: 30 TABLET | Refills: 0 | Status: SHIPPED | OUTPATIENT
Start: 2023-11-07 | End: 2023-12-07

## 2023-11-07 NOTE — TELEPHONE ENCOUNTER
PCP: Margo Sharma MD    Last appt: 4/28/2023    Future Appointments   Date Time Provider [address]  [address]   1/19/2024 11:00 AM Magdalena Moss MD [facility] BS AMB       Requested Prescriptions     Pending Prescriptions Disp Refills    zolpidem (AMBIEN) 10 MG tablet 30 tablet 0     Sig: Take 1 tablet by mouth nightly as needed for Sleep for up to 30 days.  Max Daily Amount: 10 mg

## 2024-01-12 ENCOUNTER — TELEPHONE (OUTPATIENT)
Age: 74
End: 2024-01-12

## 2024-01-12 NOTE — TELEPHONE ENCOUNTER
----- Message from Dipika Bauer sent at 1/12/2024 10:12 AM EST -----  Subject: Message to Provider    QUESTIONS  Information for Provider? Patient was returning call he missed from   office, Please return call.   ---------------------------------------------------------------------------  --------------  CALL BACK INFO  [phone]; OK to leave message on voicemail  ---------------------------------------------------------------------------  --------------  SCRIPT ANSWERS  undefined

## 2024-01-17 SDOH — HEALTH STABILITY: PHYSICAL HEALTH
ON AVERAGE, HOW MANY DAYS PER WEEK DO YOU ENGAGE IN MODERATE TO STRENUOUS EXERCISE (LIKE A BRISK WALK)?: PATIENT DECLINED

## 2024-01-19 ENCOUNTER — OFFICE VISIT (OUTPATIENT)
Age: 74
End: 2024-01-19
Payer: MEDICARE

## 2024-01-19 VITALS
HEART RATE: 60 BPM | SYSTOLIC BLOOD PRESSURE: 138 MMHG | BODY MASS INDEX: 23.84 KG/M2 | OXYGEN SATURATION: 99 % | DIASTOLIC BLOOD PRESSURE: 71 MMHG | WEIGHT: 176 LBS | TEMPERATURE: 97.9 F | HEIGHT: 72 IN | RESPIRATION RATE: 16 BRPM

## 2024-01-19 DIAGNOSIS — E78.00 HYPERCHOLESTEROLEMIA: ICD-10-CM

## 2024-01-19 DIAGNOSIS — L40.50 PSORIATIC ARTHRITIS (HCC): ICD-10-CM

## 2024-01-19 DIAGNOSIS — Z79.899 IMMUNOSUPPRESSION DUE TO DRUG THERAPY (HCC): ICD-10-CM

## 2024-01-19 DIAGNOSIS — F51.01 PRIMARY INSOMNIA: Primary | ICD-10-CM

## 2024-01-19 DIAGNOSIS — D84.821 IMMUNOSUPPRESSION DUE TO DRUG THERAPY (HCC): ICD-10-CM

## 2024-01-19 PROCEDURE — G8484 FLU IMMUNIZE NO ADMIN: HCPCS | Performed by: STUDENT IN AN ORGANIZED HEALTH CARE EDUCATION/TRAINING PROGRAM

## 2024-01-19 PROCEDURE — 1036F TOBACCO NON-USER: CPT | Performed by: STUDENT IN AN ORGANIZED HEALTH CARE EDUCATION/TRAINING PROGRAM

## 2024-01-19 PROCEDURE — G8420 CALC BMI NORM PARAMETERS: HCPCS | Performed by: STUDENT IN AN ORGANIZED HEALTH CARE EDUCATION/TRAINING PROGRAM

## 2024-01-19 PROCEDURE — 99214 OFFICE O/P EST MOD 30 MIN: CPT | Performed by: STUDENT IN AN ORGANIZED HEALTH CARE EDUCATION/TRAINING PROGRAM

## 2024-01-19 PROCEDURE — 1123F ACP DISCUSS/DSCN MKR DOCD: CPT | Performed by: STUDENT IN AN ORGANIZED HEALTH CARE EDUCATION/TRAINING PROGRAM

## 2024-01-19 PROCEDURE — G8427 DOCREV CUR MEDS BY ELIG CLIN: HCPCS | Performed by: STUDENT IN AN ORGANIZED HEALTH CARE EDUCATION/TRAINING PROGRAM

## 2024-01-19 RX ORDER — TRAZODONE HYDROCHLORIDE 50 MG/1
50 TABLET ORAL NIGHTLY
Qty: 30 TABLET | Refills: 0 | Status: SHIPPED | OUTPATIENT
Start: 2024-01-19

## 2024-01-19 SDOH — ECONOMIC STABILITY: HOUSING INSECURITY
IN THE LAST 12 MONTHS, WAS THERE A TIME WHEN YOU DID NOT HAVE A STEADY PLACE TO SLEEP OR SLEPT IN A SHELTER (INCLUDING NOW)?: NO

## 2024-01-19 SDOH — ECONOMIC STABILITY: FOOD INSECURITY: WITHIN THE PAST 12 MONTHS, THE FOOD YOU BOUGHT JUST DIDN'T LAST AND YOU DIDN'T HAVE MONEY TO GET MORE.: NEVER TRUE

## 2024-01-19 SDOH — ECONOMIC STABILITY: INCOME INSECURITY: HOW HARD IS IT FOR YOU TO PAY FOR THE VERY BASICS LIKE FOOD, HOUSING, MEDICAL CARE, AND HEATING?: NOT HARD AT ALL

## 2024-01-19 SDOH — ECONOMIC STABILITY: FOOD INSECURITY: WITHIN THE PAST 12 MONTHS, YOU WORRIED THAT YOUR FOOD WOULD RUN OUT BEFORE YOU GOT MONEY TO BUY MORE.: NEVER TRUE

## 2024-01-19 ASSESSMENT — PATIENT HEALTH QUESTIONNAIRE - PHQ9
SUM OF ALL RESPONSES TO PHQ QUESTIONS 1-9: 0
SUM OF ALL RESPONSES TO PHQ9 QUESTIONS 1 & 2: 0
SUM OF ALL RESPONSES TO PHQ QUESTIONS 1-9: 0
2. FEELING DOWN, DEPRESSED OR HOPELESS: 0
1. LITTLE INTEREST OR PLEASURE IN DOING THINGS: 0
SUM OF ALL RESPONSES TO PHQ QUESTIONS 1-9: 0
SUM OF ALL RESPONSES TO PHQ QUESTIONS 1-9: 0

## 2024-01-19 NOTE — PROGRESS NOTES
Chief Complaint   Patient presents with    New Patient       \"Have you been to the ER, urgent care clinic since your last visit?  Hospitalized since your last visit?\"    NO    “Have you seen or consulted any other health care providers outside of [facility] since your last visit?”    NO

## 2024-01-19 NOTE — PROGRESS NOTES
HISTORY OF PRESENT ILLNESS   Josue Lockwood is a 74 y.o. male who  has a past medical history of BPH with obstruction/lower urinary tract symptoms, Chronic insomnia, Chronic prescription opiate use, Coronary artery disease involving native coronary artery, Former smoker, stopped smoking in distant past, History of elevated PSA, History of immunosuppressive therapy, History of left heart catheterization, Hypercholesterolemia, Primary open angle glaucoma of right eye, Psoriatic arthritis (HCC), and Vitamin D deficiency.     Pt presents for est care. Prev seen by Dr. Addison.       HLD, CAD: s/p 3 stents in 2015. Under care of Dr. Pringle, going yearly.     Insomnia: he is taking ambien 5 mg. No sleep walking or feeling groggy the next day. He has been doing well with this regimen for years, never tried any other meds.     BPH: on finasteride, oxybutynin and doxazosin.     Psoriatic arthritis: on MTX and cosentyx, percocet, following Dr. Leonard, getting lab work every 3 months.     Tinnitus: has been evaluated, hearing ok.     Active Ambulatory Problems     Diagnosis Date Noted    Right inguinal hernia 05/29/2019    Psoriatic arthritis (HCC) 03/23/2018    Chronic insomnia 03/23/2018    Primary open angle glaucoma of right eye 03/23/2018    Thoracic back sprain 06/24/2019    Hypercholesterolemia 03/23/2018    BPH with obstruction/lower urinary tract symptoms 03/23/2018    PSA elevation 03/23/2018    Coronary artery disease involving native coronary artery 03/23/2018    Immunosuppression due to drug therapy (HCC) 04/10/2019     Resolved Ambulatory Problems     Diagnosis Date Noted    No Resolved Ambulatory Problems     Past Medical History:   Diagnosis Date    Chronic prescription opiate use     Former smoker, stopped smoking in distant past     History of elevated PSA 03/23/2018    History of immunosuppressive therapy     History of left heart catheterization 05/2015    Vitamin D deficiency          SOCIAL

## 2024-02-02 DIAGNOSIS — G47.09 OTHER INSOMNIA: Primary | ICD-10-CM

## 2024-02-02 RX ORDER — ZOLPIDEM TARTRATE 10 MG/1
10 TABLET ORAL NIGHTLY PRN
Qty: 15 TABLET | Refills: 1 | Status: SHIPPED | OUTPATIENT
Start: 2024-02-02 | End: 2024-04-02

## 2024-02-02 NOTE — TELEPHONE ENCOUNTER
Pt states that at his visit they had changed from Ambien to another medication.  Pt was to call back if it was not working.    Pt states the new medication is not working and he would like to go back on the Ambien as it works for him.    Pt needs a refill sent into [facility] #[phone]

## 2024-02-15 RX ORDER — TRAZODONE HYDROCHLORIDE 50 MG/1
50 TABLET ORAL NIGHTLY
Qty: 30 TABLET | Refills: 0 | Status: SHIPPED | OUTPATIENT
Start: 2024-02-15

## 2024-02-23 NOTE — TELEPHONE ENCOUNTER
PCP: Kameron Bell MD    Last appt: 1/19/2024  Future Appointments   Date Time Provider Department Center   5/20/2024  1:00 PM Kameron Bell MD [facility]       Requested Prescriptions     Pending Prescriptions Disp Refills    traZODone (DESYREL) 50 MG tablet 30 tablet 0     Sig: Take 1 tablet by mouth nightly

## 2024-02-24 RX ORDER — TRAZODONE HYDROCHLORIDE 50 MG/1
50 TABLET ORAL NIGHTLY
Qty: 30 TABLET | Refills: 0 | Status: SHIPPED | OUTPATIENT
Start: 2024-02-24

## 2024-03-20 RX ORDER — TRAZODONE HYDROCHLORIDE 50 MG/1
50 TABLET ORAL NIGHTLY
Qty: 30 TABLET | Refills: 0 | Status: SHIPPED | OUTPATIENT
Start: 2024-03-20

## 2024-03-21 ENCOUNTER — TELEPHONE (OUTPATIENT)
Age: 74
End: 2024-03-21

## 2024-03-21 DIAGNOSIS — G47.09 OTHER INSOMNIA: ICD-10-CM

## 2024-03-21 NOTE — TELEPHONE ENCOUNTER
Pt stated in previous message that this med sent in did not work for him.  Why was it sent he ask?    Pt had asked for Ambien. He will need a refill on this.     Send to [facility] #[id number]

## 2024-03-21 NOTE — TELEPHONE ENCOUNTER
Placed call to patient in regards to refill request to inquire if patient picked up the most recent refill for trazodone. Patient confirmed that he did not  Rx.   Patient also stated that he thinks he has about one week of Ambien left. He stated that he would call the office when the refill for Ambien is due.

## 2024-04-08 ENCOUNTER — TELEPHONE (OUTPATIENT)
Age: 74
End: 2024-04-08

## 2024-04-08 DIAGNOSIS — G47.09 OTHER INSOMNIA: ICD-10-CM

## 2024-04-08 RX ORDER — ZOLPIDEM TARTRATE 5 MG/1
5 TABLET ORAL NIGHTLY PRN
Qty: 30 TABLET | Refills: 2 | Status: SHIPPED | OUTPATIENT
Start: 2024-04-08 | End: 2024-04-08 | Stop reason: DRUGHIGH

## 2024-04-08 RX ORDER — ZOLPIDEM TARTRATE 10 MG/1
TABLET ORAL
Qty: 15 TABLET | Refills: 1 | OUTPATIENT
Start: 2024-04-08

## 2024-04-08 RX ORDER — ZOLPIDEM TARTRATE 10 MG/1
5 TABLET ORAL NIGHTLY PRN
Qty: 15 TABLET | Refills: 0 | Status: SHIPPED | OUTPATIENT
Start: 2024-04-08 | End: 2024-04-09 | Stop reason: SDUPTHER

## 2024-04-08 NOTE — TELEPHONE ENCOUNTER
Pt states that the Ambien dosage was incorrect that went to pharm.    Dosage should be 10 mg 15 pills.  He cuts in half and it last the whole month.    Insurance will not cover the way you sent in.      Please send in for 15 pills 10 mg and there will be no problem.    [facility] #[id number]

## 2024-04-08 NOTE — TELEPHONE ENCOUNTER
Refilled:      -     zolpidem (AMBIEN) 5 MG tablet; Take 1 tablet by mouth nightly as needed for Sleep for up to 90 days. Max Daily Amount: 5 mg

## 2024-04-09 DIAGNOSIS — G47.09 OTHER INSOMNIA: ICD-10-CM

## 2024-04-09 RX ORDER — ZOLPIDEM TARTRATE 10 MG/1
5 TABLET ORAL NIGHTLY PRN
Qty: 15 TABLET | Refills: 0 | Status: SHIPPED | OUTPATIENT
Start: 2024-04-09 | End: 2024-05-09

## 2024-04-09 NOTE — TELEPHONE ENCOUNTER
Regarding medication:    zolpidem (AMBIEN) 10 MG tablet [[id number]]    Order Details  Dose: 5 mg Route: Oral Frequency: NIGHTLY PRN for Sleep   Dispense Quantity: 15 tablet Refills: 0          Sig: Take 0.5 tablets by mouth nightly as needed for Sleep for up to 30 days. Max Daily Amount: 5 mg         Start Date: 04/08/24 End Date: 05/08/24   Written Date: 04/08/24 Expiration Date: 10/05/24       Associated Diagnoses: Other insomnia [G47.09]   Original Order: zolpidem (AMBIEN) 5 MG tablet [[id number]]   Providers    Authorizing Provider: Kameron Bell MD NPI: [id number]   Ordering User: Kalpana Judge LPN    Cosigner: Kameron Bell MD Signed: 4/8/2024 12:18     Pharmacy    [facility][id number] [facility] [address] [place] [address] - P [phone] - F [phone]  [address] [place] [id number]  Phone: [phone]  Fax: [phone]       Problem: MED DID NOT TRANSMIT TO THE PHARMACY        Caller confirms readback of documented phone/fax number(s) as correct.

## 2024-05-07 DIAGNOSIS — G47.09 OTHER INSOMNIA: ICD-10-CM

## 2024-05-07 RX ORDER — ZOLPIDEM TARTRATE 10 MG/1
5 TABLET ORAL NIGHTLY PRN
Qty: 15 TABLET | Refills: 0 | Status: SHIPPED | OUTPATIENT
Start: 2024-05-07 | End: 2024-06-06

## 2024-06-05 DIAGNOSIS — G47.09 OTHER INSOMNIA: ICD-10-CM

## 2024-06-05 RX ORDER — ZOLPIDEM TARTRATE 10 MG/1
TABLET ORAL
Qty: 15 TABLET | Refills: 0 | Status: SHIPPED | OUTPATIENT
Start: 2024-06-05 | End: 2024-09-03

## 2024-06-18 SDOH — HEALTH STABILITY: PHYSICAL HEALTH: ON AVERAGE, HOW MANY DAYS PER WEEK DO YOU ENGAGE IN MODERATE TO STRENUOUS EXERCISE (LIKE A BRISK WALK)?: 3 DAYS

## 2024-06-18 SDOH — HEALTH STABILITY: PHYSICAL HEALTH: ON AVERAGE, HOW MANY MINUTES DO YOU ENGAGE IN EXERCISE AT THIS LEVEL?: 20 MIN

## 2024-06-18 ASSESSMENT — PATIENT HEALTH QUESTIONNAIRE - PHQ9
SUM OF ALL RESPONSES TO PHQ QUESTIONS 1-9: 0
SUM OF ALL RESPONSES TO PHQ9 QUESTIONS 1 & 2: 0
SUM OF ALL RESPONSES TO PHQ QUESTIONS 1-9: 0
1. LITTLE INTEREST OR PLEASURE IN DOING THINGS: NOT AT ALL
SUM OF ALL RESPONSES TO PHQ QUESTIONS 1-9: 0
SUM OF ALL RESPONSES TO PHQ QUESTIONS 1-9: 0
2. FEELING DOWN, DEPRESSED OR HOPELESS: NOT AT ALL

## 2024-06-18 ASSESSMENT — LIFESTYLE VARIABLES
HOW MANY STANDARD DRINKS CONTAINING ALCOHOL DO YOU HAVE ON A TYPICAL DAY: PATIENT DOES NOT DRINK
HOW OFTEN DO YOU HAVE A DRINK CONTAINING ALCOHOL: NEVER

## 2024-06-23 SDOH — HEALTH STABILITY: PHYSICAL HEALTH: ON AVERAGE, HOW MANY MINUTES DO YOU ENGAGE IN EXERCISE AT THIS LEVEL?: 20 MIN

## 2024-06-23 SDOH — HEALTH STABILITY: PHYSICAL HEALTH: ON AVERAGE, HOW MANY DAYS PER WEEK DO YOU ENGAGE IN MODERATE TO STRENUOUS EXERCISE (LIKE A BRISK WALK)?: 3 DAYS

## 2024-06-23 ASSESSMENT — LIFESTYLE VARIABLES
HOW OFTEN DO YOU HAVE A DRINK CONTAINING ALCOHOL: 1
HOW MANY STANDARD DRINKS CONTAINING ALCOHOL DO YOU HAVE ON A TYPICAL DAY: 0
HOW OFTEN DO YOU HAVE SIX OR MORE DRINKS ON ONE OCCASION: 1
HOW OFTEN DO YOU HAVE A DRINK CONTAINING ALCOHOL: NEVER
HOW MANY STANDARD DRINKS CONTAINING ALCOHOL DO YOU HAVE ON A TYPICAL DAY: PATIENT DOES NOT DRINK

## 2024-06-23 ASSESSMENT — PATIENT HEALTH QUESTIONNAIRE - PHQ9
SUM OF ALL RESPONSES TO PHQ9 QUESTIONS 1 & 2: 0
1. LITTLE INTEREST OR PLEASURE IN DOING THINGS: NOT AT ALL
SUM OF ALL RESPONSES TO PHQ QUESTIONS 1-9: 0
SUM OF ALL RESPONSES TO PHQ QUESTIONS 1-9: 0
2. FEELING DOWN, DEPRESSED OR HOPELESS: NOT AT ALL
SUM OF ALL RESPONSES TO PHQ QUESTIONS 1-9: 0
SUM OF ALL RESPONSES TO PHQ QUESTIONS 1-9: 0

## 2024-06-26 ENCOUNTER — OFFICE VISIT (OUTPATIENT)
Age: 74
End: 2024-06-26
Payer: MEDICARE

## 2024-06-26 VITALS
HEART RATE: 64 BPM | BODY MASS INDEX: 23.3 KG/M2 | OXYGEN SATURATION: 98 % | SYSTOLIC BLOOD PRESSURE: 132 MMHG | HEIGHT: 72 IN | DIASTOLIC BLOOD PRESSURE: 60 MMHG | RESPIRATION RATE: 16 BRPM | TEMPERATURE: 97.4 F | WEIGHT: 172 LBS

## 2024-06-26 DIAGNOSIS — L40.50 PSORIATIC ARTHRITIS (HCC): ICD-10-CM

## 2024-06-26 DIAGNOSIS — Z00.00 MEDICARE ANNUAL WELLNESS VISIT, SUBSEQUENT: Primary | ICD-10-CM

## 2024-06-26 DIAGNOSIS — F51.01 PRIMARY INSOMNIA: ICD-10-CM

## 2024-06-26 DIAGNOSIS — D84.821 IMMUNOSUPPRESSION DUE TO DRUG THERAPY (HCC): ICD-10-CM

## 2024-06-26 DIAGNOSIS — E78.00 HYPERCHOLESTEROLEMIA: ICD-10-CM

## 2024-06-26 DIAGNOSIS — Z79.899 IMMUNOSUPPRESSION DUE TO DRUG THERAPY (HCC): ICD-10-CM

## 2024-06-26 DIAGNOSIS — R79.89 LOW VITAMIN D LEVEL: ICD-10-CM

## 2024-06-26 PROCEDURE — G8427 DOCREV CUR MEDS BY ELIG CLIN: HCPCS | Performed by: STUDENT IN AN ORGANIZED HEALTH CARE EDUCATION/TRAINING PROGRAM

## 2024-06-26 PROCEDURE — 1036F TOBACCO NON-USER: CPT | Performed by: STUDENT IN AN ORGANIZED HEALTH CARE EDUCATION/TRAINING PROGRAM

## 2024-06-26 PROCEDURE — 1123F ACP DISCUSS/DSCN MKR DOCD: CPT | Performed by: STUDENT IN AN ORGANIZED HEALTH CARE EDUCATION/TRAINING PROGRAM

## 2024-06-26 PROCEDURE — 99214 OFFICE O/P EST MOD 30 MIN: CPT | Performed by: STUDENT IN AN ORGANIZED HEALTH CARE EDUCATION/TRAINING PROGRAM

## 2024-06-26 PROCEDURE — G0439 PPPS, SUBSEQ VISIT: HCPCS | Performed by: STUDENT IN AN ORGANIZED HEALTH CARE EDUCATION/TRAINING PROGRAM

## 2024-06-26 PROCEDURE — G8420 CALC BMI NORM PARAMETERS: HCPCS | Performed by: STUDENT IN AN ORGANIZED HEALTH CARE EDUCATION/TRAINING PROGRAM

## 2024-06-26 NOTE — PROGRESS NOTES
Medicare Annual Wellness Visit    Josue Lockwood is here for Medicare AWV    Assessment & Plan   Medicare annual wellness visit, subsequent  Psoriatic arthritis (HCC)  Assessment & Plan:   Monitored by specialist- no acute findings meriting change in the plan  Orders:  -     CBC with Auto Differential; Future  Hypercholesterolemia  Assessment & Plan:   Well-controlled, continue current plan pending work up below  Orders:  -     Comprehensive Metabolic Panel; Future  -     Lipid Panel; Future  Primary insomnia  Assessment & Plan:  Stable, continue ambien, did not tolerate trazodone.   Low vitamin D level  Immunosuppression due to drug therapy (HCC)  Assessment & Plan:  Stable, current treatment regimen controlling psoriasis very well. Following with Dr. Leonard.  Recommendations for Preventive Services Due: see orders and patient instructions/AVS.  Recommended screening schedule for the next 5-10 years is provided to the patient in written form: see Patient Instructions/AVS.     Return in about 6 months (around 12/26/2024) for cholesterol and insomnia.     Subjective       Patient's complete Health Risk Assessment and screening values have been reviewed and are found in Flowsheets. The following problems were reviewed today and where indicated follow up appointments were made and/or referrals ordered.    Positive Risk Factor Screenings with Interventions:            Controlled Medication Review:      Today's Pain Level: No data recorded   Opioid Risk: (Low risk score <55) Opioid risk score: 7    Patient is low risk for opioid use disorder or overdose.    Last PDMP Rehan as Reviewed:  Review User Review Instant Review Result                   Activity, Diet, and Weight:  On average, how many days per week do you engage in moderate to strenuous exercise (like a brisk walk)?: 3 days  On average, how many minutes do you engage in exercise at this level?: 20 min    Do you eat balanced/healthy meals regularly?: (!)

## 2024-06-26 NOTE — PATIENT INSTRUCTIONS
Eating Healthy Foods: Care Instructions  With every meal, you can make healthy food choices. Try to eat a variety of fruits, vegetables, whole grains, lean proteins, and low-fat dairy products. This can help you get the right balance of nutrients, including vitamins and minerals. Small changes add up over time. You can start by adding one healthy food to your meals each day.    Try to make half your plate fruits and vegetables, one-fourth whole grains, and one-fourth lean proteins. Try including dairy with your meals.   Eat more fruits and vegetables. Try to have them with most meals and snacks.   Foods for healthy eating        Fruits   These can be fresh, frozen, canned, or dried.  Try to choose whole fruit rather than fruit juice.  Eat a variety of colors.        Vegetables   These can be fresh, frozen, canned, or dried.  Beans, peas, and lentils count too.        Whole grains   Choose whole-grain breads, cereals, and noodles.  Try brown rice.        Lean proteins   These can include lean meat, poultry, fish, and eggs.  You can also have tofu, beans, peas, lentils, nuts, and seeds.        Dairy   Try milk, yogurt, and cheese.  Choose low-fat or fat-free when you can.  If you need to, use lactose-free milk or fortified plant-based milk products, such as soy milk.        Water   Drink water when you're thirsty.  Limit sugar-sweetened drinks, including soda, fruit drinks, and sports drinks.  Where can you learn more?  Go to https://www.db4objects.net/patientEd and enter T756 to learn more about \"Eating Healthy Foods: Care Instructions.\"  Current as of: September 20, 2023  Content Version: 14.1  © 5612-6976 [employer].   Care instructions adapted under license by [employer]. If you have questions about a medical condition or this instruction, always ask your healthcare professional. [employer] disclaims any warranty or liability for your use of this information.           [employer]

## 2024-06-26 NOTE — ASSESSMENT & PLAN NOTE
Monitored by specialist- no acute findings meriting change in the plan   Full note to follow 69yo male w/PMH prediabetes, coronary disease S/P CABG in [place] in 2012, s/p multiple PCIs- last PCI 12/2019 w/MARCELINA x2, HLD, HTN, and hyperthyroidism. Patient presents to ED c/o constant dull left chest pressure 2/10 w/intermittent increase in discomfort (that resolves on its own) and radiation down left arm for the past several days. Pain is non reproducible w/o aggravating or modifying factors. He states that last week he thinks he overexerted himself shoveling snow, and has been under increased stress regarding caring of his parents and reminiscing about his son that passed away 8yrs ago. For the last week he has been waking up in the middle of the night anxious, diaphoretic and tearful. He normally exercises routinely, 1-1.5 hours 3x/week,  and denies chest pain or SOB during, though he has not been as active over the last month or so. He follows closely w/cardiologist Dr. Oviedo. Per LOV 11/20 "TTE from 11/2019 LVEF 46%, mild-mod MR, and moderate AS, and apical inferior hypokinesis. Subsequent LHC in 12/2019 with LIMA to LAD dz s/p [place] MARCELINA x2. He reports compliance with his medications. Recent bloodwork reviewed, LDL 70, Hgb A1C 6.4.".     Cardiac meds:  Teresa Low Dose 81 MG Oral Tablet Delayed Release; TAKE 1 TABLET DAILY AS  DIRECTED  Clopidogrel Bisulfate 75 MG Oral Tablet; TAKE 1 TABLET BY MOUTH EVERY DAY  Losartan Potassium 100 MG Oral Tablet; TAKE 1 TABLET BY MOUTH ONCE DAILY  Metoprolol Succinate ER 50 MG Oral Tablet Extended Release 24 Hour; TAKE 1/2  TABLET DAILY  Rosuvastatin Calcium 20 MG Oral Tablet; TAKE 1 TABLET DAILY    Chest Pain, CAD  -EKG w/o acute ischemic changes  -Trop neg x1, repeat x3 sets  -TTE  -NPO after MN for NST-pharm tomorrow unless above findings abnormal will then consider LHC  -c/w DAPT, BB, Statin      ICM, EF (11/2019) 46%  -euvolemic on exam  -repeat TTE  -c/w Losartan, Toprol XL      HTN  -c/w above med regimen      HLD  -c/w statin    Plan discussed w/pt and ED team    Assessment and recommendations are final when note is signed by the attending.

## 2024-06-26 NOTE — PROGRESS NOTES
HISTORY OF PRESENT ILLNESS   Josue Lockwood is a 74 y.o. male who  has a past medical history of BPH with obstruction/lower urinary tract symptoms, Chronic insomnia, Chronic prescription opiate use, Coronary artery disease involving native coronary artery, Former smoker, stopped smoking in distant past, History of elevated PSA, History of immunosuppressive therapy, History of left heart catheterization, Hypercholesterolemia, Primary open angle glaucoma of right eye, Psoriatic arthritis (HCC), and Vitamin D deficiency.     Pt presents for mawv    HLD, CAD: s/p 3 stents in 2015. Under care of Dr. Pringle, going yearly.   9/28/22 ldl 54.8    Insomnia: he is taking ambien 5 mg. No sleep walking or feeling groggy the next day. He has been doing well with this regimen for years, never tried any other meds.   1/19/24  Controlled with ambien, attempted to switch to trazodone but did not work.     BPH: on finasteride, oxybutynin and doxazosin.   Following w Dr. Perry, checking psa yearly    Psoriatic arthritis: on MTX and cosentyx, percocet, following Dr. Leonard, getting lab work every 3 months.     Tinnitus: has been evaluated, hearing ok.     HM:   Colonoscopy due 12/13/2028   PSA: urology monitoring    Active Ambulatory Problems     Diagnosis Date Noted    Right inguinal hernia 05/29/2019    Psoriatic arthritis (HCC) 03/23/2018    Chronic insomnia 03/23/2018    Primary open angle glaucoma of right eye 03/23/2018    Thoracic back sprain 06/24/2019    Hypercholesterolemia 03/23/2018    BPH with obstruction/lower urinary tract symptoms 03/23/2018    PSA elevation 03/23/2018    Coronary artery disease involving native coronary artery 03/23/2018    Immunosuppression due to drug therapy (HCC) 04/10/2019    Primary insomnia 01/19/2024     Resolved Ambulatory Problems     Diagnosis Date Noted    No Resolved Ambulatory Problems     Past Medical History:   Diagnosis Date    Chronic prescription opiate use     Former smoker,

## 2024-06-26 NOTE — PROGRESS NOTES
\"Have you been to the ER, urgent care clinic since your last visit?  Hospitalized since your last visit?\"    NO    “Have you seen or consulted any other health care providers outside of [facility] since your last visit?”    Rheumatologist// Dr. Hitesh Leonard            Click Here for Release of Records Request

## 2024-06-27 LAB
ALBUMIN SERPL-MCNC: 4.6 G/DL (ref 3.8–4.8)
ALP SERPL-CCNC: 110 IU/L (ref 44–121)
ALT SERPL-CCNC: 19 IU/L (ref 0–44)
AST SERPL-CCNC: 16 IU/L (ref 0–40)
BASOPHILS # BLD AUTO: 0 X10E3/UL (ref 0–0.2)
BASOPHILS NFR BLD AUTO: 1 %
BILIRUB SERPL-MCNC: 1.3 MG/DL (ref 0–1.2)
BUN SERPL-MCNC: 16 MG/DL (ref 8–27)
BUN/CREAT SERPL: 17 (ref 10–24)
CALCIUM SERPL-MCNC: 10 MG/DL (ref 8.6–10.2)
CHLORIDE SERPL-SCNC: 101 MMOL/L (ref 96–106)
CHOLEST SERPL-MCNC: 121 MG/DL (ref 100–199)
CO2 SERPL-SCNC: 25 MMOL/L (ref 20–29)
CREAT SERPL-MCNC: 0.92 MG/DL (ref 0.76–1.27)
EGFRCR SERPLBLD CKD-EPI 2021: 86 ML/MIN/1.73
EOSINOPHIL # BLD AUTO: 0.1 X10E3/UL (ref 0–0.4)
EOSINOPHIL NFR BLD AUTO: 1 %
ERYTHROCYTE [DISTWIDTH] IN BLOOD BY AUTOMATED COUNT: 13.3 % (ref 11.6–15.4)
GLOBULIN SER CALC-MCNC: 2.5 G/DL (ref 1.5–4.5)
GLUCOSE SERPL-MCNC: 87 MG/DL (ref 70–99)
HCT VFR BLD AUTO: 41.2 % (ref 37.5–51)
HDLC SERPL-MCNC: 53 MG/DL
HGB BLD-MCNC: 13.7 G/DL (ref 13–17.7)
IMM GRANULOCYTES # BLD AUTO: 0 X10E3/UL (ref 0–0.1)
IMM GRANULOCYTES NFR BLD AUTO: 0 %
LDLC SERPL CALC-MCNC: 50 MG/DL (ref 0–99)
LYMPHOCYTES # BLD AUTO: 1.8 X10E3/UL (ref 0.7–3.1)
LYMPHOCYTES NFR BLD AUTO: 29 %
MCH RBC QN AUTO: 30 PG (ref 26.6–33)
MCHC RBC AUTO-ENTMCNC: 33.3 G/DL (ref 31.5–35.7)
MCV RBC AUTO: 90 FL (ref 79–97)
MONOCYTES # BLD AUTO: 0.5 X10E3/UL (ref 0.1–0.9)
MONOCYTES NFR BLD AUTO: 8 %
NEUTROPHILS # BLD AUTO: 3.7 X10E3/UL (ref 1.4–7)
NEUTROPHILS NFR BLD AUTO: 61 %
PLATELET # BLD AUTO: 196 X10E3/UL (ref 150–450)
POTASSIUM SERPL-SCNC: 4.8 MMOL/L (ref 3.5–5.2)
PROT SERPL-MCNC: 7.1 G/DL (ref 6–8.5)
RBC # BLD AUTO: 4.57 X10E6/UL (ref 4.14–5.8)
SODIUM SERPL-SCNC: 139 MMOL/L (ref 134–144)
TRIGL SERPL-MCNC: 95 MG/DL (ref 0–149)
VLDLC SERPL CALC-MCNC: 18 MG/DL (ref 5–40)
WBC # BLD AUTO: 6.1 X10E3/UL (ref 3.4–10.8)

## 2024-07-08 DIAGNOSIS — G47.09 OTHER INSOMNIA: ICD-10-CM

## 2024-07-08 RX ORDER — ZOLPIDEM TARTRATE 10 MG/1
TABLET ORAL
Qty: 15 TABLET | Refills: 2 | Status: SHIPPED | OUTPATIENT
Start: 2024-07-08 | End: 2024-10-06

## 2024-09-07 ENCOUNTER — HOSPITAL ENCOUNTER (EMERGENCY)
Facility: HOSPITAL | Age: 74
Discharge: HOME | End: 2024-09-07
Payer: MEDICARE

## 2024-09-07 ENCOUNTER — APPOINTMENT (OUTPATIENT)
Facility: HOSPITAL | Age: 74
End: 2024-09-07
Payer: MEDICARE

## 2024-09-07 VITALS
SYSTOLIC BLOOD PRESSURE: 120 MMHG | TEMPERATURE: 98.8 F | BODY MASS INDEX: 24.24 KG/M2 | RESPIRATION RATE: 18 BRPM | DIASTOLIC BLOOD PRESSURE: 88 MMHG | OXYGEN SATURATION: 97 % | WEIGHT: 178 LBS | HEART RATE: 90 BPM

## 2024-09-07 DIAGNOSIS — M20.011 MALLET FINGER OF RIGHT HAND: ICD-10-CM

## 2024-09-07 DIAGNOSIS — T63.461A WASP STING, ACCIDENTAL OR UNINTENTIONAL, INITIAL ENCOUNTER: Primary | ICD-10-CM

## 2024-09-07 PROCEDURE — 99283 EMERGENCY DEPT VISIT LOW MDM: CPT

## 2024-09-07 PROCEDURE — 6360000002 HC RX W HCPCS

## 2024-09-07 PROCEDURE — 73130 X-RAY EXAM OF HAND: CPT

## 2024-09-07 PROCEDURE — 6370000000 HC RX 637 (ALT 250 FOR IP)

## 2024-09-07 RX ORDER — DIPHENHYDRAMINE HCL 25 MG
25 CAPSULE ORAL
Status: COMPLETED | OUTPATIENT
Start: 2024-09-07 | End: 2024-09-07

## 2024-09-07 RX ORDER — DEXAMETHASONE SODIUM PHOSPHATE 10 MG/ML
8 INJECTION, SOLUTION INTRAMUSCULAR; INTRAVENOUS ONCE
Status: COMPLETED | OUTPATIENT
Start: 2024-09-07 | End: 2024-09-07

## 2024-09-07 RX ADMIN — DEXAMETHASONE SODIUM PHOSPHATE 8 MG: 10 INJECTION, SOLUTION INTRAMUSCULAR; INTRAVENOUS at 15:08

## 2024-09-07 RX ADMIN — DIPHENHYDRAMINE HYDROCHLORIDE 25 MG: 25 CAPSULE ORAL at 15:08

## 2024-09-07 ASSESSMENT — PAIN SCALES - GENERAL: PAINLEVEL_OUTOF10: 9

## 2024-09-07 ASSESSMENT — PAIN DESCRIPTION - LOCATION: LOCATION: FACE

## 2024-09-07 ASSESSMENT — PAIN - FUNCTIONAL ASSESSMENT: PAIN_FUNCTIONAL_ASSESSMENT: 0-10

## 2024-09-07 NOTE — ED PROVIDER NOTES
[facility] EMERGENCY DEPT  EMERGENCY DEPARTMENT ENCOUNTER         Pt Name: Josue Lockwood  MRN: [id number]  Birthdate 1950  Date of evaluation: 9/7/2024  Provider: Antonette Sunshine PA-C   PCP: Kameron Bell MD  Note Started: 4:38 PM EDT 9/7/24     CHIEF COMPLAINT       Chief Complaint   Patient presents with    Insect Bite     Pt presents ambulatory to triage after being stung approx x5 by bees to his face. Pt denies CP, SOB, N/V at this time    Finger Injury     Pt was attempting to run from bees when he reports he injured the 4th finger on his R hand. Swelling and pain to the site        HISTORY OF PRESENT ILLNESS: 1 or more elements      History From: Patient  HPI Limitations: None     Josue Lockwood is a 74 y.o. male who presents with 4 wasp stings to the face after doing yard work today.  Patient states that when he was running away from them he fell and injured his right fourth finger.  Fingers DIP is stuck in flexion.     Nursing Notes were all reviewed and agreed with or any disagreements were addressed in the HPI.  Please see MDM for additional details of HPI and ROS     REVIEW OF SYSTEMS      Review of Systems   Musculoskeletal:  Positive for arthralgias and myalgias.   All other systems reviewed and are negative.       Positives and Pertinent negatives as per HPI.    PAST HISTORY     Past Medical History:  Past Medical History:   Diagnosis Date    BPH with obstruction/lower urinary tract symptoms 03/23/2018    Chronic insomnia 03/23/2018    Chronic prescription opiate use     Coronary artery disease involving native coronary artery 03/23/2018    Status post stents ?3    Former smoker, stopped smoking in distant past     History of elevated PSA 03/23/2018    Status post biopsy.  Atypical small acinar proliferation    History of immunosuppressive therapy     History of left heart catheterization 05/2015    1. Significant 3-vessel coronary artery disease. 2. Well-preserved left ventricular systolic  function. 3. Normal left ventricular end-diastolic pressure.    Hypercholesterolemia 2018    Primary open angle glaucoma of right eye 2018    Psoriatic arthritis (HCC)     Dr. Leonard (Rheumatology) following - RX = Cosentyx + MTX    Vitamin D deficiency        Past Surgical History:  Past Surgical History:   Procedure Laterality Date    CARDIAC CATHETERIZATION  2015    COLONOSCOPY N/A 2018    COLONOSCOPY performed by Francisco [facility] at [facility] ENDOSCOPY    HERNIA REPAIR  2019    Open repair of right inguinal hernia with mesh     IR INJ FORAMIN EPIDURAL LUMB ANES/STER ADDL  2019    IR LUMBAR TRANSFORAMINAL EPIDURAL SINGLE  2019    KNEE ARTHROSCOPY Bilateral     ORTHOPEDIC SURGERY Right     Cubital tunnel sx       Family History:  Family History   Problem Relation Age of Onset    Heart Disease Brother     Hypertension Brother     Cancer Sister         rectal    No Known Problems Father     Bleeding Prob Mother        Social History:  Social History     Tobacco Use    Smoking status: Former     Current packs/day: 0.00     Types: Cigarettes     Quit date: 1972     Years since quittin.7    Smokeless tobacco: Never   Vaping Use    Vaping status: Never Used   Substance Use Topics    Alcohol use: No    Drug use: No       Allergies:  No Known Allergies    CURRENT MEDICATIONS      Previous Medications    ASCORBIC ACID (VITAMIN C) 500 MG TABLET    Take 2 tablets by mouth daily    ASPIRIN 81 MG EC TABLET    Take by mouth daily    ATORVASTATIN (LIPITOR) 20 MG TABLET    Take 1 tablet by mouth    DOXAZOSIN (CARDURA) 4 MG TABLET    Take 1 tablet by mouth    FINASTERIDE (PROSCAR) 5 MG TABLET    Take 1 tablet by mouth    FOLIC ACID (FOLVITE) 1 MG TABLET    Take 1 tablet by mouth daily    LATANOPROST (XALATAN) 0.005 % OPHTHALMIC SOLUTION    ceived the following from [facility]: Outside name: latanoprost (XALATAN) 0.005 % ophthalmic solution    METHOTREXATE (RHEUMATREX)

## 2024-10-08 DIAGNOSIS — G47.09 OTHER INSOMNIA: ICD-10-CM

## 2024-10-09 RX ORDER — ZOLPIDEM TARTRATE 10 MG/1
TABLET ORAL
Qty: 15 TABLET | Refills: 2 | Status: SHIPPED | OUTPATIENT
Start: 2024-10-09 | End: 2025-01-07

## 2025-01-06 DIAGNOSIS — G47.09 OTHER INSOMNIA: ICD-10-CM

## 2025-01-08 RX ORDER — ZOLPIDEM TARTRATE 10 MG/1
TABLET ORAL
Qty: 15 TABLET | Refills: 2 | Status: SHIPPED | OUTPATIENT
Start: 2025-01-08 | End: 2025-04-08

## 2025-02-14 ENCOUNTER — APPOINTMENT (OUTPATIENT)
Facility: HOSPITAL | Age: 75
End: 2025-02-14
Payer: MEDICARE

## 2025-02-14 ENCOUNTER — HOSPITAL ENCOUNTER (OUTPATIENT)
Facility: HOSPITAL | Age: 75
Setting detail: OBSERVATION
Discharge: HOME | End: 2025-02-16
Attending: STUDENT IN AN ORGANIZED HEALTH CARE EDUCATION/TRAINING PROGRAM | Admitting: STUDENT IN AN ORGANIZED HEALTH CARE EDUCATION/TRAINING PROGRAM
Payer: MEDICARE

## 2025-02-14 ENCOUNTER — APPOINTMENT (OUTPATIENT)
Facility: HOSPITAL | Age: 75
End: 2025-02-14
Attending: STUDENT IN AN ORGANIZED HEALTH CARE EDUCATION/TRAINING PROGRAM
Payer: MEDICARE

## 2025-02-14 DIAGNOSIS — R55 SYNCOPE AND COLLAPSE: Primary | ICD-10-CM
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
## 2025-02-14 LAB
ALBUMIN SERPL-MCNC: 3.7 G/DL (ref 3.5–5)
ALBUMIN/GLOB SERPL: 1.3 (ref 1.1–2.2)
ALP SERPL-CCNC: 106 U/L (ref 45–117)
ALT SERPL-CCNC: 25 U/L (ref 12–78)
ANION GAP SERPL CALC-SCNC: 5 MMOL/L (ref 2–12)
APPEARANCE UR: CLEAR
AST SERPL-CCNC: 18 U/L (ref 15–37)
BACTERIA URNS QL MICRO: NEGATIVE /HPF
BASOPHILS # BLD: 0.01 K/UL (ref 0–0.1)
BASOPHILS NFR BLD: 0.2 % (ref 0–1)
BILIRUB SERPL-MCNC: 2 MG/DL (ref 0.2–1)
BILIRUB UR QL: NEGATIVE
BUN SERPL-MCNC: 15 MG/DL (ref 6–20)
BUN/CREAT SERPL: 14 (ref 12–20)
CALCIUM SERPL-MCNC: 8.7 MG/DL (ref 8.5–10.1)
CHLORIDE SERPL-SCNC: 106 MMOL/L (ref 97–108)
CO2 SERPL-SCNC: 27 MMOL/L (ref 21–32)
COLOR UR: ABNORMAL
CREAT SERPL-MCNC: 1.06 MG/DL (ref 0.7–1.3)
DIFFERENTIAL METHOD BLD: ABNORMAL
EOSINOPHIL # BLD: 0.01 K/UL (ref 0–0.4)
EOSINOPHIL NFR BLD: 0.2 % (ref 0–7)
EPITH CASTS URNS QL MICRO: ABNORMAL /LPF
ERYTHROCYTE [DISTWIDTH] IN BLOOD BY AUTOMATED COUNT: 13.1 % (ref 11.5–14.5)
FLUAV RNA SPEC QL NAA+PROBE: NOT DETECTED
FLUBV RNA SPEC QL NAA+PROBE: NOT DETECTED
GLOBULIN SER CALC-MCNC: 2.9 G/DL (ref 2–4)
GLUCOSE SERPL-MCNC: 135 MG/DL (ref 65–100)
GLUCOSE UR STRIP.AUTO-MCNC: NEGATIVE MG/DL
HCT VFR BLD AUTO: 39.1 % (ref 36.6–50.3)
HGB BLD-MCNC: 13.8 G/DL (ref 12.1–17)
HGB UR QL STRIP: ABNORMAL
HYALINE CASTS URNS QL MICRO: ABNORMAL /LPF (ref 0–2)
IMM GRANULOCYTES # BLD AUTO: 0.01 K/UL (ref 0–0.04)
IMM GRANULOCYTES NFR BLD AUTO: 0.2 % (ref 0–0.5)
KETONES UR QL STRIP.AUTO: NEGATIVE MG/DL
LEUKOCYTE ESTERASE UR QL STRIP.AUTO: NEGATIVE
LYMPHOCYTES # BLD: 0.21 K/UL (ref 0.8–3.5)
LYMPHOCYTES NFR BLD: 5.2 % (ref 12–49)
MAGNESIUM SERPL-MCNC: 1.7 MG/DL (ref 1.6–2.4)
MCH RBC QN AUTO: 30.1 PG (ref 26–34)
MCHC RBC AUTO-ENTMCNC: 35.3 G/DL (ref 30–36.5)
MCV RBC AUTO: 85.2 FL (ref 80–99)
MONOCYTES # BLD: 0.27 K/UL (ref 0–1)
MONOCYTES NFR BLD: 6.7 % (ref 5–13)
NEUTS SEG # BLD: 3.5 K/UL (ref 1.8–8)
NEUTS SEG NFR BLD: 87.5 % (ref 32–75)
NITRITE UR QL STRIP.AUTO: NEGATIVE
NRBC # BLD: 0 K/UL (ref 0–0.01)
NRBC BLD-RTO: 0 PER 100 WBC
PH UR STRIP: 5 (ref 5–8)
PLATELET # BLD AUTO: 131 K/UL (ref 150–400)
PMV BLD AUTO: 10.6 FL (ref 8.9–12.9)
POTASSIUM SERPL-SCNC: 3.8 MMOL/L (ref 3.5–5.1)
PROT SERPL-MCNC: 6.6 G/DL (ref 6.4–8.2)
PROT UR STRIP-MCNC: NEGATIVE MG/DL
RBC # BLD AUTO: 4.59 M/UL (ref 4.1–5.7)
RBC #/AREA URNS HPF: ABNORMAL /HPF (ref 0–5)
RBC MORPH BLD: ABNORMAL
SARS-COV-2 RNA RESP QL NAA+PROBE: NOT DETECTED
SODIUM SERPL-SCNC: 138 MMOL/L (ref 136–145)
SOURCE: NORMAL
SP GR UR REFRACTOMETRY: 1.01 (ref 1–1.03)
TROPONIN I SERPL HS-MCNC: 5 NG/L (ref 0–76)
TROPONIN I SERPL HS-MCNC: <4 NG/L (ref 0–76)
TSH SERPL DL<=0.05 MIU/L-ACNC: 0.39 UIU/ML (ref 0.36–3.74)
URINE CULTURE IF INDICATED: ABNORMAL
UROBILINOGEN UR QL STRIP.AUTO: 0.2 EU/DL (ref 0.2–1)
WBC # BLD AUTO: 4 K/UL (ref 4.1–11.1)
WBC URNS QL MICRO: ABNORMAL /HPF (ref 0–4)

## 2025-02-14 PROCEDURE — 6360000002 HC RX W HCPCS: Performed by: STUDENT IN AN ORGANIZED HEALTH CARE EDUCATION/TRAINING PROGRAM

## 2025-02-14 PROCEDURE — 84443 ASSAY THYROID STIM HORMONE: CPT

## 2025-02-14 PROCEDURE — 93005 ELECTROCARDIOGRAM TRACING: CPT | Performed by: STUDENT IN AN ORGANIZED HEALTH CARE EDUCATION/TRAINING PROGRAM

## 2025-02-14 PROCEDURE — 87636 SARSCOV2 & INF A&B AMP PRB: CPT

## 2025-02-14 PROCEDURE — 84484 ASSAY OF TROPONIN QUANT: CPT

## 2025-02-14 PROCEDURE — 96376 TX/PRO/DX INJ SAME DRUG ADON: CPT

## 2025-02-14 PROCEDURE — 6360000002 HC RX W HCPCS: Performed by: NURSE PRACTITIONER

## 2025-02-14 PROCEDURE — 99285 EMERGENCY DEPT VISIT HI MDM: CPT

## 2025-02-14 PROCEDURE — 71046 X-RAY EXAM CHEST 2 VIEWS: CPT

## 2025-02-14 PROCEDURE — 81001 URINALYSIS AUTO W/SCOPE: CPT

## 2025-02-14 PROCEDURE — 6360000004 HC RX CONTRAST MEDICATION: Performed by: STUDENT IN AN ORGANIZED HEALTH CARE EDUCATION/TRAINING PROGRAM

## 2025-02-14 PROCEDURE — 96374 THER/PROPH/DIAG INJ IV PUSH: CPT

## 2025-02-14 PROCEDURE — 85025 COMPLETE CBC W/AUTO DIFF WBC: CPT

## 2025-02-14 PROCEDURE — 6370000000 HC RX 637 (ALT 250 FOR IP): Performed by: NURSE PRACTITIONER

## 2025-02-14 PROCEDURE — 80053 COMPREHEN METABOLIC PANEL: CPT

## 2025-02-14 PROCEDURE — 96375 TX/PRO/DX INJ NEW DRUG ADDON: CPT

## 2025-02-14 PROCEDURE — 74174 CTA ABD&PLVS W/CONTRAST: CPT

## 2025-02-14 PROCEDURE — G0378 HOSPITAL OBSERVATION PER HR: HCPCS

## 2025-02-14 PROCEDURE — 36415 COLL VENOUS BLD VENIPUNCTURE: CPT

## 2025-02-14 PROCEDURE — 83735 ASSAY OF MAGNESIUM: CPT

## 2025-02-14 RX ORDER — FINASTERIDE 5 MG/1
5 TABLET, FILM COATED ORAL DAILY
Status: DISCONTINUED | OUTPATIENT
Start: 2025-02-15 | End: 2025-02-16 | Stop reason: HOSPADM

## 2025-02-14 RX ORDER — ACETAMINOPHEN 325 MG/1
650 TABLET ORAL EVERY 6 HOURS PRN
Status: DISCONTINUED | OUTPATIENT
Start: 2025-02-14 | End: 2025-02-16 | Stop reason: HOSPADM

## 2025-02-14 RX ORDER — ZOLPIDEM TARTRATE 5 MG/1
5 TABLET ORAL NIGHTLY PRN
Status: DISCONTINUED | OUTPATIENT
Start: 2025-02-14 | End: 2025-02-16 | Stop reason: HOSPADM

## 2025-02-14 RX ORDER — DOXAZOSIN 2 MG/1
4 TABLET ORAL NIGHTLY
Status: DISCONTINUED | OUTPATIENT
Start: 2025-02-14 | End: 2025-02-16 | Stop reason: HOSPADM

## 2025-02-14 RX ORDER — ONDANSETRON 4 MG/1
4 TABLET, ORALLY DISINTEGRATING ORAL EVERY 8 HOURS PRN
Status: DISCONTINUED | OUTPATIENT
Start: 2025-02-14 | End: 2025-02-16 | Stop reason: HOSPADM

## 2025-02-14 RX ORDER — POTASSIUM CHLORIDE 1500 MG/1
40 TABLET, EXTENDED RELEASE ORAL PRN
Status: DISCONTINUED | OUTPATIENT
Start: 2025-02-14 | End: 2025-02-16 | Stop reason: HOSPADM

## 2025-02-14 RX ORDER — IOPAMIDOL 755 MG/ML
100 INJECTION, SOLUTION INTRAVASCULAR
Status: COMPLETED | OUTPATIENT
Start: 2025-02-14 | End: 2025-02-14

## 2025-02-14 RX ORDER — SODIUM CHLORIDE 9 MG/ML
INJECTION, SOLUTION INTRAVENOUS PRN
Status: DISCONTINUED | OUTPATIENT
Start: 2025-02-14 | End: 2025-02-16 | Stop reason: HOSPADM

## 2025-02-14 RX ORDER — MORPHINE SULFATE 4 MG/ML
4 INJECTION, SOLUTION INTRAMUSCULAR; INTRAVENOUS
Status: COMPLETED | OUTPATIENT
Start: 2025-02-14 | End: 2025-02-14

## 2025-02-14 RX ORDER — SODIUM CHLORIDE 0.9 % (FLUSH) 0.9 %
5-40 SYRINGE (ML) INJECTION PRN
Status: DISCONTINUED | OUTPATIENT
Start: 2025-02-14 | End: 2025-02-16 | Stop reason: HOSPADM

## 2025-02-14 RX ORDER — OXYCODONE HYDROCHLORIDE 5 MG/1
10 TABLET ORAL EVERY 4 HOURS PRN
Status: DISCONTINUED | OUTPATIENT
Start: 2025-02-14 | End: 2025-02-16 | Stop reason: HOSPADM

## 2025-02-14 RX ORDER — METHOTREXATE 2.5 MG/1
15 TABLET ORAL WEEKLY
Status: DISCONTINUED | OUTPATIENT
Start: 2025-02-14 | End: 2025-02-16 | Stop reason: HOSPADM

## 2025-02-14 RX ORDER — ASPIRIN 81 MG/1
81 TABLET ORAL DAILY
Status: DISCONTINUED | OUTPATIENT
Start: 2025-02-14 | End: 2025-02-16 | Stop reason: HOSPADM

## 2025-02-14 RX ORDER — ATORVASTATIN CALCIUM 20 MG/1
20 TABLET, FILM COATED ORAL DAILY
Status: DISCONTINUED | OUTPATIENT
Start: 2025-02-14 | End: 2025-02-16 | Stop reason: HOSPADM

## 2025-02-14 RX ORDER — POTASSIUM CHLORIDE 7.45 MG/ML
10 INJECTION INTRAVENOUS PRN
Status: DISCONTINUED | OUTPATIENT
Start: 2025-02-14 | End: 2025-02-16 | Stop reason: HOSPADM

## 2025-02-14 RX ORDER — MAGNESIUM SULFATE IN WATER 40 MG/ML
2000 INJECTION, SOLUTION INTRAVENOUS PRN
Status: DISCONTINUED | OUTPATIENT
Start: 2025-02-14 | End: 2025-02-16 | Stop reason: HOSPADM

## 2025-02-14 RX ORDER — POLYETHYLENE GLYCOL 3350 17 G/17G
17 POWDER, FOR SOLUTION ORAL DAILY PRN
Status: DISCONTINUED | OUTPATIENT
Start: 2025-02-14 | End: 2025-02-16 | Stop reason: HOSPADM

## 2025-02-14 RX ORDER — OXYBUTYNIN CHLORIDE 5 MG/1
10 TABLET, EXTENDED RELEASE ORAL DAILY
Status: DISCONTINUED | OUTPATIENT
Start: 2025-02-15 | End: 2025-02-16 | Stop reason: HOSPADM

## 2025-02-14 RX ORDER — LATANOPROST 50 UG/ML
1 SOLUTION/ DROPS OPHTHALMIC NIGHTLY
Status: DISCONTINUED | OUTPATIENT
Start: 2025-02-14 | End: 2025-02-16 | Stop reason: HOSPADM

## 2025-02-14 RX ORDER — SODIUM CHLORIDE 0.9 % (FLUSH) 0.9 %
5-40 SYRINGE (ML) INJECTION EVERY 12 HOURS SCHEDULED
Status: DISCONTINUED | OUTPATIENT
Start: 2025-02-14 | End: 2025-02-16 | Stop reason: HOSPADM

## 2025-02-14 RX ORDER — KETOROLAC TROMETHAMINE 30 MG/ML
30 INJECTION, SOLUTION INTRAMUSCULAR; INTRAVENOUS EVERY 6 HOURS PRN
Status: DISCONTINUED | OUTPATIENT
Start: 2025-02-14 | End: 2025-02-16 | Stop reason: HOSPADM

## 2025-02-14 RX ORDER — ACETAMINOPHEN 650 MG/1
650 SUPPOSITORY RECTAL EVERY 6 HOURS PRN
Status: DISCONTINUED | OUTPATIENT
Start: 2025-02-14 | End: 2025-02-16 | Stop reason: HOSPADM

## 2025-02-14 RX ORDER — ONDANSETRON 2 MG/ML
4 INJECTION INTRAMUSCULAR; INTRAVENOUS EVERY 6 HOURS PRN
Status: DISCONTINUED | OUTPATIENT
Start: 2025-02-14 | End: 2025-02-16 | Stop reason: HOSPADM

## 2025-02-14 RX ORDER — KETOROLAC TROMETHAMINE 30 MG/ML
30 INJECTION, SOLUTION INTRAMUSCULAR; INTRAVENOUS EVERY 6 HOURS
Status: DISCONTINUED | OUTPATIENT
Start: 2025-02-14 | End: 2025-02-16

## 2025-02-14 RX ORDER — FOLIC ACID 1 MG/1
1 TABLET ORAL DAILY
Status: DISCONTINUED | OUTPATIENT
Start: 2025-02-14 | End: 2025-02-16 | Stop reason: HOSPADM

## 2025-02-14 RX ORDER — ENOXAPARIN SODIUM 100 MG/ML
40 INJECTION SUBCUTANEOUS DAILY
Status: DISCONTINUED | OUTPATIENT
Start: 2025-02-15 | End: 2025-02-16 | Stop reason: HOSPADM

## 2025-02-14 RX ORDER — VITAMIN B COMPLEX
5000 TABLET ORAL DAILY
Status: DISCONTINUED | OUTPATIENT
Start: 2025-02-15 | End: 2025-02-16 | Stop reason: HOSPADM

## 2025-02-14 RX ORDER — ASCORBIC ACID 500 MG
1000 TABLET ORAL DAILY
Status: DISCONTINUED | OUTPATIENT
Start: 2025-02-15 | End: 2025-02-16 | Stop reason: HOSPADM

## 2025-02-14 RX ADMIN — ASPIRIN 81 MG: 81 TABLET, COATED ORAL at 18:12

## 2025-02-14 RX ADMIN — ATORVASTATIN CALCIUM 20 MG: 20 TABLET, FILM COATED ORAL at 18:12

## 2025-02-14 RX ADMIN — ZOLPIDEM TARTRATE 5 MG: 5 TABLET, FILM COATED ORAL at 23:34

## 2025-02-14 RX ADMIN — KETOROLAC TROMETHAMINE 30 MG: 30 INJECTION, SOLUTION INTRAMUSCULAR at 18:13

## 2025-02-14 RX ADMIN — KETOROLAC TROMETHAMINE 30 MG: 30 INJECTION, SOLUTION INTRAMUSCULAR at 22:44

## 2025-02-14 RX ADMIN — OXYCODONE 10 MG: 5 TABLET ORAL at 22:43

## 2025-02-14 RX ADMIN — MORPHINE SULFATE 4 MG: 4 INJECTION, SOLUTION INTRAMUSCULAR; INTRAVENOUS at 14:40

## 2025-02-14 RX ADMIN — FOLIC ACID 1 MG: 1 TABLET ORAL at 18:13

## 2025-02-14 RX ADMIN — IOPAMIDOL 100 ML: 755 INJECTION, SOLUTION INTRAVENOUS at 13:40

## 2025-02-14 RX ADMIN — METHOTREXATE 15 MG: 2.5 TABLET ORAL at 19:08

## 2025-02-14 ASSESSMENT — PAIN DESCRIPTION - PAIN TYPE: TYPE: ACUTE PAIN

## 2025-02-14 ASSESSMENT — PAIN SCALES - GENERAL
PAINLEVEL_OUTOF10: 8
PAINLEVEL_OUTOF10: 10
PAINLEVEL_OUTOF10: 5
PAINLEVEL_OUTOF10: 8
PAINLEVEL_OUTOF10: 0
PAINLEVEL_OUTOF10: 6
PAINLEVEL_OUTOF10: 4

## 2025-02-14 ASSESSMENT — PAIN DESCRIPTION - LOCATION
LOCATION: SHOULDER;BACK
LOCATION: BACK;NECK;SHOULDER

## 2025-02-14 ASSESSMENT — PAIN DESCRIPTION - DESCRIPTORS
DESCRIPTORS: SHARP
DESCRIPTORS: SPASM;SORE

## 2025-02-14 ASSESSMENT — PAIN DESCRIPTION - ORIENTATION
ORIENTATION: MID;UPPER
ORIENTATION: RIGHT;LEFT;UPPER

## 2025-02-14 ASSESSMENT — PAIN DESCRIPTION - FREQUENCY: FREQUENCY: CONTINUOUS

## 2025-02-14 ASSESSMENT — PAIN - FUNCTIONAL ASSESSMENT: PAIN_FUNCTIONAL_ASSESSMENT: PREVENTS OR INTERFERES SOME ACTIVE ACTIVITIES AND ADLS

## 2025-02-14 NOTE — H&P
Hospitalist Admission Note    NAME:   Josue Lockwood   : 1950   MRN: [id number]     Date/Time: 2025 4:16 PM    Patient PCP: Kameron Bell MD    ______________________________________________________________________  Given the patient's current clinical presentation, I have a high level of concern for decompensation if discharged from the emergency department.  Complex decision making was performed, which includes reviewing the patient's available past medical records, laboratory results, and x-ray films.       My assessment of this patient's clinical condition and my plan of care is as follows.    Assessment / Plan:      Admit to medicine    Ground-level fall  Acute on chronic pain   Shoulder bilat pain  scapula pain bilat  Fatigue    EKG normal sinus rhythm  CXR  IMPRESSION:  Normal PA and lateral chest views.  CTA Chest   IMPRESSION:  1. Atherosclerotic vascular change. Infrarenal aorta is mildly ectatic. No  aneurysm or dissection  2. Diverticulosis of the left colon. No acute diverticulitis  3. Enlarged prostate     -Chemistry unremarkable  -Hematology unremarkable  -Obtain urinalysis  -COVID influenza A/B negative  -Will repeat troponin initial troponin 4  -PT OT consult  -Orthostatic blood pressures  -Consult cardiology per family request; history of coronary artery disease  With stents- daughter is concerned that shoulder pain may be related to stents  -Trend CBC BMP daily  -No clinical indication for antibiotics    Acute on chronic pain  -Chronic opioid use  Patient take oxycodone 7.5/325 as needed  -Morphine provided in ED little response  -Toradol 30 mg x 1 in the ED      Hypercholesterolemia  Resume statin  Check lipid panel    BPH  -followed by [facility]   PSA 2.3 2024    Medical Decision Making:   I personally reviewed labs: Yes  I personally reviewed imaging: Yes yes  I personally reviewed EKG:  Toxic drug monitoring: Yes  Discussed case with: ED provider.

## 2025-02-14 NOTE — PROGRESS NOTES
Pharmacy Medication History Note    The patient was interviewed regarding current PTA medication list, use and drug allergies;  patient present in room and obtained permission from patient to discuss drug regimen with visitor(s) present. The patient was questioned regarding use of any other inhalers, topical products, over the counter medications, herbal medications, vitamin products or ophthalmic/nasal/otic medication use.     Allergy Update: Patient has no known allergies.    Recommendations/Findings:   The following amendments were made to the patient's active medication list on file at [facility]:   1) Additions: none  2) Deletions:   -trazodone  -naloxone  3) Changes:   Oxycodone prn sig updated  Pertinent Findings: none    Clarified PTA med list with rx query, patient interview. PTA medication list was corrected to the following:     Prior to Admission Medications   Prescriptions Last Dose Informant   ascorbic acid (VITAMIN C) 500 MG tablet 2/13/2025 Self   Sig: Take 2 tablets by mouth daily   aspirin 81 MG EC tablet 2/13/2025 Self   Sig: Take by mouth daily   atorvastatin (LIPITOR) 20 MG tablet 2/13/2025 Self   Sig: Take 1 tablet by mouth daily   doxazosin (CARDURA) 4 MG tablet 2/13/2025 Self   Sig: Take 1 tablet by mouth nightly   finasteride (PROSCAR) 5 MG tablet 2/13/2025 Self   Sig: Take 1 tablet by mouth daily   folic acid (FOLVITE) 1 MG tablet 2/13/2025 Self   Sig: Take 1 tablet by mouth daily   latanoprost (XALATAN) 0.005 % ophthalmic solution 2/13/2025 Self   Sig: Place 1 drop into both eyes nightly ceived the following from [facility]: Outside name: latanoprost (XALATAN) 0.005 % ophthalmic solution   methotrexate (RHEUMATREX) 2.5 MG chemo tablet Past Week Self   Sig: Take 6 tablets by mouth once a week Friday   oxyCODONE-acetaminophen (PERCOCET) 7.5-325 MG per tablet Past Week Self   Sig: Take 1-2 tablets by mouth 4 times daily as needed for Pain.   oxybutynin (DITROPAN-XL) 10 MG extended

## 2025-02-14 NOTE — ED PROVIDER NOTES
reviewed.   Constitutional:       Appearance: Normal appearance.   HENT:      Head: Normocephalic.   Eyes:      Extraocular Movements: Extraocular movements intact.      Pupils: Pupils are equal, round, and reactive to light.   Cardiovascular:      Rate and Rhythm: Normal rate and regular rhythm.   Pulmonary:      Breath sounds: Normal breath sounds.   Abdominal:      General: Abdomen is flat.      Palpations: Abdomen is soft.   Musculoskeletal:      Comments: presenting with back pain, syncope, fatigue.  This started last night with some scapula pain.  Persisted through the night.  This morning, he suddenly started feeling very weak, \"like I was drunk\".  Tried to walk to his bedroom but passed out - his brother managed to catch him mostly.  Hit his hand which hit his face.  Notes he feels very weak now.  Denies SOB.  Did have some nausea earlier.    On exam, Diffuse ttp over BL scapula and midline throcacic back   Skin:     General: Skin is warm.   Neurological:      General: No focal deficit present.      Mental Status: He is alert and oriented to person, place, and time.   Psychiatric:         Mood and Affect: Mood normal.         Behavior: Behavior normal.          DIAGNOSTIC RESULTS   LABS:     Recent Results (from the past 24 hour(s))   EKG 12 Lead    Collection Time: 02/14/25 12:14 PM   Result Value Ref Range    Ventricular Rate 86 BPM    Atrial Rate 86 BPM    P-R Interval 164 ms    QRS Duration 82 ms    Q-T Interval 332 ms    QTc Calculation (Bazett) 397 ms    P Axis 47 degrees    R Axis -1 degrees    T Axis 13 degrees    Diagnosis       Normal sinus rhythm  Minimal voltage criteria for LVH, may be normal variant ( R in aVL )  Septal infarct , age undetermined  Cannot rule out Inferior infarct , age undetermined  Abnormal ECG  When compared with ECG of 17-JUL-2019 13:15,  Septal infarct is now present  Minimal criteria for Inferior infarct are now present  T wave amplitude has decreased in Anterior leads

## 2025-02-15 LAB
ANION GAP SERPL CALC-SCNC: 8 MMOL/L (ref 2–12)
BASOPHILS # BLD: 0.02 K/UL (ref 0–0.1)
BASOPHILS NFR BLD: 0.6 % (ref 0–1)
BUN SERPL-MCNC: 17 MG/DL (ref 6–20)
BUN/CREAT SERPL: 17 (ref 12–20)
CALCIUM SERPL-MCNC: 8.5 MG/DL (ref 8.5–10.1)
CHLORIDE SERPL-SCNC: 105 MMOL/L (ref 97–108)
CO2 SERPL-SCNC: 24 MMOL/L (ref 21–32)
CREAT SERPL-MCNC: 1 MG/DL (ref 0.7–1.3)
DIFFERENTIAL METHOD BLD: ABNORMAL
EKG ATRIAL RATE: 86 BPM
EKG DIAGNOSIS: NORMAL
EKG P AXIS: 47 DEGREES
EKG P-R INTERVAL: 164 MS
EKG Q-T INTERVAL: 332 MS
EKG QRS DURATION: 82 MS
EKG QTC CALCULATION (BAZETT): 397 MS
EKG R AXIS: -1 DEGREES
EKG T AXIS: 13 DEGREES
EKG VENTRICULAR RATE: 86 BPM
EOSINOPHIL # BLD: 0.01 K/UL (ref 0–0.4)
EOSINOPHIL NFR BLD: 0.3 % (ref 0–7)
ERYTHROCYTE [DISTWIDTH] IN BLOOD BY AUTOMATED COUNT: 12.9 % (ref 11.5–14.5)
GLUCOSE SERPL-MCNC: 99 MG/DL (ref 65–100)
HCT VFR BLD AUTO: 35.8 % (ref 36.6–50.3)
HGB BLD-MCNC: 12.6 G/DL (ref 12.1–17)
IMM GRANULOCYTES # BLD AUTO: 0.01 K/UL (ref 0–0.04)
IMM GRANULOCYTES NFR BLD AUTO: 0.3 % (ref 0–0.5)
LYMPHOCYTES # BLD: 0.89 K/UL (ref 0.8–3.5)
LYMPHOCYTES NFR BLD: 25.4 % (ref 12–49)
MCH RBC QN AUTO: 29.6 PG (ref 26–34)
MCHC RBC AUTO-ENTMCNC: 35.2 G/DL (ref 30–36.5)
MCV RBC AUTO: 84.2 FL (ref 80–99)
MONOCYTES # BLD: 0.38 K/UL (ref 0–1)
MONOCYTES NFR BLD: 10.8 % (ref 5–13)
NEUTS SEG # BLD: 2.2 K/UL (ref 1.8–8)
NEUTS SEG NFR BLD: 62.6 % (ref 32–75)
NRBC # BLD: 0 K/UL (ref 0–0.01)
NRBC BLD-RTO: 0 PER 100 WBC
PLATELET # BLD AUTO: 133 K/UL (ref 150–400)
PMV BLD AUTO: 10.9 FL (ref 8.9–12.9)
POTASSIUM SERPL-SCNC: 3.6 MMOL/L (ref 3.5–5.1)
RBC # BLD AUTO: 4.25 M/UL (ref 4.1–5.7)
SODIUM SERPL-SCNC: 137 MMOL/L (ref 136–145)
T4 FREE SERPL-MCNC: 1.2 NG/DL (ref 0.8–1.5)
TSH SERPL DL<=0.05 MIU/L-ACNC: 0.7 UIU/ML (ref 0.36–3.74)
VIT B12 SERPL-MCNC: 441 PG/ML (ref 193–986)
WBC # BLD AUTO: 3.5 K/UL (ref 4.1–11.1)

## 2025-02-15 PROCEDURE — 84439 ASSAY OF FREE THYROXINE: CPT

## 2025-02-15 PROCEDURE — 96376 TX/PRO/DX INJ SAME DRUG ADON: CPT

## 2025-02-15 PROCEDURE — 84443 ASSAY THYROID STIM HORMONE: CPT

## 2025-02-15 PROCEDURE — 6370000000 HC RX 637 (ALT 250 FOR IP): Performed by: NURSE PRACTITIONER

## 2025-02-15 PROCEDURE — 80048 BASIC METABOLIC PNL TOTAL CA: CPT

## 2025-02-15 PROCEDURE — 82607 VITAMIN B-12: CPT

## 2025-02-15 PROCEDURE — 85025 COMPLETE CBC W/AUTO DIFF WBC: CPT

## 2025-02-15 PROCEDURE — 96361 HYDRATE IV INFUSION ADD-ON: CPT

## 2025-02-15 PROCEDURE — G0378 HOSPITAL OBSERVATION PER HR: HCPCS

## 2025-02-15 PROCEDURE — 36415 COLL VENOUS BLD VENIPUNCTURE: CPT

## 2025-02-15 PROCEDURE — 97161 PT EVAL LOW COMPLEX 20 MIN: CPT

## 2025-02-15 PROCEDURE — 2580000003 HC RX 258: Performed by: STUDENT IN AN ORGANIZED HEALTH CARE EDUCATION/TRAINING PROGRAM

## 2025-02-15 PROCEDURE — 6360000002 HC RX W HCPCS: Performed by: NURSE PRACTITIONER

## 2025-02-15 PROCEDURE — 82306 VITAMIN D 25 HYDROXY: CPT

## 2025-02-15 PROCEDURE — 2500000003 HC RX 250 WO HCPCS: Performed by: NURSE PRACTITIONER

## 2025-02-15 PROCEDURE — 96375 TX/PRO/DX INJ NEW DRUG ADDON: CPT

## 2025-02-15 PROCEDURE — 97530 THERAPEUTIC ACTIVITIES: CPT

## 2025-02-15 PROCEDURE — 97165 OT EVAL LOW COMPLEX 30 MIN: CPT

## 2025-02-15 RX ORDER — SODIUM CHLORIDE, SODIUM LACTATE, POTASSIUM CHLORIDE, AND CALCIUM CHLORIDE .6; .31; .03; .02 G/100ML; G/100ML; G/100ML; G/100ML
500 INJECTION, SOLUTION INTRAVENOUS ONCE
Status: COMPLETED | OUTPATIENT
Start: 2025-02-15 | End: 2025-02-15

## 2025-02-15 RX ADMIN — ZOLPIDEM TARTRATE 5 MG: 5 TABLET, FILM COATED ORAL at 23:08

## 2025-02-15 RX ADMIN — KETOROLAC TROMETHAMINE 30 MG: 30 INJECTION, SOLUTION INTRAMUSCULAR at 17:15

## 2025-02-15 RX ADMIN — SODIUM CHLORIDE, POTASSIUM CHLORIDE, SODIUM LACTATE AND CALCIUM CHLORIDE 500 ML: 600; 310; 30; 20 INJECTION, SOLUTION INTRAVENOUS at 11:44

## 2025-02-15 RX ADMIN — SODIUM CHLORIDE, PRESERVATIVE FREE 10 ML: 5 INJECTION INTRAVENOUS at 09:13

## 2025-02-15 RX ADMIN — OXYCODONE HYDROCHLORIDE AND ACETAMINOPHEN 1000 MG: 500 TABLET ORAL at 09:10

## 2025-02-15 RX ADMIN — FINASTERIDE 5 MG: 5 TABLET, FILM COATED ORAL at 09:11

## 2025-02-15 RX ADMIN — KETOROLAC TROMETHAMINE 30 MG: 30 INJECTION, SOLUTION INTRAMUSCULAR at 09:12

## 2025-02-15 RX ADMIN — ASPIRIN 81 MG: 81 TABLET, COATED ORAL at 09:10

## 2025-02-15 RX ADMIN — KETOROLAC TROMETHAMINE 30 MG: 30 INJECTION, SOLUTION INTRAMUSCULAR at 05:51

## 2025-02-15 RX ADMIN — Medication 2000 UNITS: at 09:10

## 2025-02-15 RX ADMIN — DOXAZOSIN 4 MG: 2 TABLET ORAL at 20:52

## 2025-02-15 RX ADMIN — KETOROLAC TROMETHAMINE 30 MG: 30 INJECTION, SOLUTION INTRAMUSCULAR at 23:08

## 2025-02-15 RX ADMIN — SODIUM CHLORIDE, PRESERVATIVE FREE 10 ML: 5 INJECTION INTRAVENOUS at 20:53

## 2025-02-15 RX ADMIN — ATORVASTATIN CALCIUM 20 MG: 20 TABLET, FILM COATED ORAL at 09:10

## 2025-02-15 RX ADMIN — FOLIC ACID 1 MG: 1 TABLET ORAL at 09:11

## 2025-02-15 RX ADMIN — ONDANSETRON 4 MG: 2 INJECTION, SOLUTION INTRAMUSCULAR; INTRAVENOUS at 17:14

## 2025-02-15 ASSESSMENT — PAIN DESCRIPTION - DIRECTION: RADIATING_TOWARDS: NO

## 2025-02-15 ASSESSMENT — PAIN DESCRIPTION - PAIN TYPE: TYPE: ACUTE PAIN

## 2025-02-15 ASSESSMENT — PAIN DESCRIPTION - ORIENTATION: ORIENTATION: POSTERIOR

## 2025-02-15 ASSESSMENT — PAIN SCALES - GENERAL
PAINLEVEL_OUTOF10: 3
PAINLEVEL_OUTOF10: 5
PAINLEVEL_OUTOF10: 2
PAINLEVEL_OUTOF10: 2

## 2025-02-15 ASSESSMENT — PAIN DESCRIPTION - LOCATION
LOCATION: NECK
LOCATION: NECK

## 2025-02-15 ASSESSMENT — PAIN DESCRIPTION - DESCRIPTORS: DESCRIPTORS: ACHING

## 2025-02-15 ASSESSMENT — PAIN DESCRIPTION - FREQUENCY: FREQUENCY: CONTINUOUS

## 2025-02-15 ASSESSMENT — PAIN DESCRIPTION - ONSET: ONSET: ON-GOING

## 2025-02-15 ASSESSMENT — PAIN - FUNCTIONAL ASSESSMENT: PAIN_FUNCTIONAL_ASSESSMENT: PREVENTS OR INTERFERES SOME ACTIVE ACTIVITIES AND ADLS

## 2025-02-15 NOTE — PROGRESS NOTES
Physical Therapy    Orders received, chart reviewed and patient evaluated by physical therapy. Pending progression with skilled acute physical therapy, recommend:    No skilled physical therapy    Pt independent in mobility and gait, no sxs. Pt had shown orthostatic drop from supine to stand with RN just prior to session but BP taken prior to after amb in standing showed an increase. Pt without sxs. No PT needs identified.     Recommend with nursing OOB to chair 3x/day and walking daily with 1 assist and  none - mainly due to syncopal hx . Thank you for completing as able in order to maintain patient strength, endurance and independence.     Full evaluation to follow.

## 2025-02-15 NOTE — ED NOTES
Bedside shift change report given to CANDICE Medina (oncoming nurse) by CANDICE Olmos (offgoing nurse). Report included the following information Nurse Handoff Report, ED Encounter Summary, ED SBAR, and MAR.

## 2025-02-15 NOTE — PROGRESS NOTES
OCCUPATIONAL THERAPY EVALUATION/DISCHARGE  Patient: Josue Lockwood (75 y.o. male)  Date: 2/15/2025  Primary Diagnosis: Syncope and collapse [R55]  Fall at home, initial encounter [W19.XXXA, Y92.009]         Precautions:                    ASSESSMENT :  Based on the objective data below, the patient presents near or at his functional independent baseline. At this session, pt walked all around room and down hallways without difficulty. Pain in back/shoulders/scapulas has resolved. BP increased slightly after walking down hallway. No acute OT needs identified based on performance during this session.    Functional Outcome Measure:  The patient scored 24/24 on the [facility] AM-PACTM \"6 Clicks\"Daily Activity Inpatient Short Form outcome measure which is indicative of independent with ADLs/IADLs.      Further skilled acute occupational therapy is not indicated at this time.     PLAN :    Recommendation for discharge: (in order for the patient to meet his/her long term goals):   No skilled occupational therapy    Other factors to consider for discharge: no additional factors    IF patient discharges home will need the following DME: none     SUBJECTIVE:   Patient stated, “I feel pretty normal.”    OBJECTIVE DATA SUMMARY:     Past Medical History:   Diagnosis Date    BPH with obstruction/lower urinary tract symptoms 03/23/2018    Chronic insomnia 03/23/2018    Chronic prescription opiate use     Coronary artery disease involving native coronary artery 03/23/2018    Status post stents ?3    Former smoker, stopped smoking in distant past     History of elevated PSA 03/23/2018    Status post biopsy.  Atypical small acinar proliferation    History of immunosuppressive therapy     History of left heart catheterization 05/2015    1. Significant 3-vessel coronary artery disease. 2. Well-preserved left ventricular systolic function. 3. Normal left ventricular end-diastolic pressure.    Hypercholesterolemia 03/23/2018

## 2025-02-15 NOTE — PROGRESS NOTES
PHYSICAL THERAPY EVALUATION/DISCHARGE    Patient: Josue Lockwood (75 y.o. male)  Date: 2/15/2025  Primary Diagnosis: Syncope and collapse [R55]  Fall at home, initial encounter [W19.XXXA, Y92.009]       Precautions:                        ASSESSMENT AND RECOMMENDATIONS:  Based on the objective data below, the patient presents independent in mobility and gait without device, no sxs. PTA pt had feeling of tightness in neck/B shoulders/upper back and got diaphoretic, got up to walk and after short distance became syncopal. Now shows steady gait, no LOB or path deviation. Pt feels at baseline other than some stiffness due to being in bed. Pt had shown orthostatic drop from supine to stand (no sxs) with RN just prior to session but BP taken prior to after amb in standing showed an increase and no drop. Pt without sxs. No PT needs identified. Will sign off.    Functional Outcome Measure:  The patient scored 24/24 on the [facility] outcome measure     Further skilled acute physical therapy is not indicated at this time.       PLAN :  Recommendation for discharge: (in order for the patient to meet his/her long term goals):   No skilled physical therapy    Other factors to consider for discharge: no additional factors    IF patient discharges home will need the following DME: none       SUBJECTIVE:   Patient stated “I got up and started walking and then went down.”    OBJECTIVE DATA SUMMARY:     Past Medical History:   Diagnosis Date    BPH with obstruction/lower urinary tract symptoms 03/23/2018    Chronic insomnia 03/23/2018    Chronic prescription opiate use     Coronary artery disease involving native coronary artery 03/23/2018    Status post stents ?3    Former smoker, stopped smoking in distant past     History of elevated PSA 03/23/2018    Status post biopsy.  Atypical small acinar proliferation    History of immunosuppressive therapy     History of left heart catheterization 05/2015    1. Significant 3-vessel coronary

## 2025-02-15 NOTE — PROGRESS NOTES
Hospitalist Progress Note    NAME:   Josue Lockwood   : 1950   MRN: [id number]     Date/Time: 2/15/2025 6:13 PM  Patient PCP: Kameron Bell MD    Estimated discharge date:  Barriers:       Assessment / Plan:      Syncope possible vasovagal in origin   Ground-level fall  Acute on chronic pain   Shoulder bilat pain  scapula pain bilat  Fatigue     Hx: Patient reported feeling very hot all of a sudden while sitting on couch, reported transient and few second loss of consciousness.  No confusion after the loss of consciousness  EKG normal sinus rhythm  CXR: Normal PA and lateral chest views.  CTA Chest: Atherosclerotic vascular change. Infrarenal aorta is mildly ectatic. No  aneurysm or dissection  UA normal   COVID influenza A/B negative  Orthostatic blood pressure positive, more than 20 drop from supine to standing position    Plan :   Status post 500 mL of IV fluid today, will check the orthostatic vitals tomorrow again  PT OT consult: No skilled physical therapy need  Consult cardiology per family request; history of coronary artery disease/ With stents- daughter is concerned that shoulder pain may be related to stents       Acute on chronic pain  -Chronic opioid use  Patient take oxycodone 7.5/325 as needed  -Morphine provided in ED little response  -Toradol 30 mg x 1 in the ED        Hypercholesterolemia  Continue statin       BPH  -followed by [facility]   PSA 2.3 2024        Medical Decision Making:   I personally reviewed labs: All labs from this admission  I personally reviewed imaging: Imaging from this admission  I personally reviewed EKG: Yes  Toxic drug monitoring:   Discussed case with: Patient, bedside nurse        Code Status: Full code  DVT Prophylaxis: Enoxaparin  GI Prophylaxis:    Subjective:     Chief Complaint / Reason for Physician Visit  \" Patient reported he is feeling better now.  No more syncope and dizziness.  Reported this is the first time happened

## 2025-02-16 VITALS
WEIGHT: 174 LBS | BODY MASS INDEX: 23.57 KG/M2 | SYSTOLIC BLOOD PRESSURE: 133 MMHG | RESPIRATION RATE: 18 BRPM | HEIGHT: 72 IN | HEART RATE: 67 BPM | TEMPERATURE: 98.4 F | OXYGEN SATURATION: 96 % | DIASTOLIC BLOOD PRESSURE: 70 MMHG

## 2025-02-16 LAB
25(OH)D3 SERPL-MCNC: 36.7 NG/ML (ref 30–100)
ANION GAP SERPL CALC-SCNC: 6 MMOL/L (ref 2–12)
BASOPHILS # BLD: 0.02 K/UL (ref 0–0.1)
BASOPHILS NFR BLD: 0.5 % (ref 0–1)
BUN SERPL-MCNC: 16 MG/DL (ref 6–20)
BUN/CREAT SERPL: 20 (ref 12–20)
CALCIUM SERPL-MCNC: 8.2 MG/DL (ref 8.5–10.1)
CHLORIDE SERPL-SCNC: 109 MMOL/L (ref 97–108)
CO2 SERPL-SCNC: 24 MMOL/L (ref 21–32)
CREAT SERPL-MCNC: 0.81 MG/DL (ref 0.7–1.3)
DIFFERENTIAL METHOD BLD: ABNORMAL
EOSINOPHIL # BLD: 0.09 K/UL (ref 0–0.4)
EOSINOPHIL NFR BLD: 2.1 % (ref 0–7)
ERYTHROCYTE [DISTWIDTH] IN BLOOD BY AUTOMATED COUNT: 12.8 % (ref 11.5–14.5)
GLUCOSE SERPL-MCNC: 92 MG/DL (ref 65–100)
HCT VFR BLD AUTO: 33.7 % (ref 36.6–50.3)
HGB BLD-MCNC: 12 G/DL (ref 12.1–17)
IMM GRANULOCYTES # BLD AUTO: 0.02 K/UL (ref 0–0.04)
IMM GRANULOCYTES NFR BLD AUTO: 0.5 % (ref 0–0.5)
LYMPHOCYTES # BLD: 1.12 K/UL (ref 0.8–3.5)
LYMPHOCYTES NFR BLD: 26.2 % (ref 12–49)
MCH RBC QN AUTO: 29.9 PG (ref 26–34)
MCHC RBC AUTO-ENTMCNC: 35.6 G/DL (ref 30–36.5)
MCV RBC AUTO: 84 FL (ref 80–99)
MONOCYTES # BLD: 0.61 K/UL (ref 0–1)
MONOCYTES NFR BLD: 14.3 % (ref 5–13)
NEUTS SEG # BLD: 2.42 K/UL (ref 1.8–8)
NEUTS SEG NFR BLD: 56.4 % (ref 32–75)
NRBC # BLD: 0 K/UL (ref 0–0.01)
NRBC BLD-RTO: 0 PER 100 WBC
PLATELET # BLD AUTO: 126 K/UL (ref 150–400)
PMV BLD AUTO: 10.7 FL (ref 8.9–12.9)
POTASSIUM SERPL-SCNC: 3.6 MMOL/L (ref 3.5–5.1)
RBC # BLD AUTO: 4.01 M/UL (ref 4.1–5.7)
SODIUM SERPL-SCNC: 139 MMOL/L (ref 136–145)
WBC # BLD AUTO: 4.3 K/UL (ref 4.1–11.1)

## 2025-02-16 PROCEDURE — 6370000000 HC RX 637 (ALT 250 FOR IP): Performed by: NURSE PRACTITIONER

## 2025-02-16 PROCEDURE — 2500000003 HC RX 250 WO HCPCS: Performed by: NURSE PRACTITIONER

## 2025-02-16 PROCEDURE — 6360000002 HC RX W HCPCS: Performed by: NURSE PRACTITIONER

## 2025-02-16 PROCEDURE — 96376 TX/PRO/DX INJ SAME DRUG ADON: CPT

## 2025-02-16 PROCEDURE — 80048 BASIC METABOLIC PNL TOTAL CA: CPT

## 2025-02-16 PROCEDURE — G0378 HOSPITAL OBSERVATION PER HR: HCPCS

## 2025-02-16 PROCEDURE — 85025 COMPLETE CBC W/AUTO DIFF WBC: CPT

## 2025-02-16 PROCEDURE — 36415 COLL VENOUS BLD VENIPUNCTURE: CPT

## 2025-02-16 RX ADMIN — FOLIC ACID 1 MG: 1 TABLET ORAL at 10:08

## 2025-02-16 RX ADMIN — Medication 5000 UNITS: at 10:07

## 2025-02-16 RX ADMIN — SODIUM CHLORIDE, PRESERVATIVE FREE 10 ML: 5 INJECTION INTRAVENOUS at 10:10

## 2025-02-16 RX ADMIN — KETOROLAC TROMETHAMINE 30 MG: 30 INJECTION, SOLUTION INTRAMUSCULAR at 04:54

## 2025-02-16 RX ADMIN — ASPIRIN 81 MG: 81 TABLET, COATED ORAL at 10:08

## 2025-02-16 RX ADMIN — OXYCODONE HYDROCHLORIDE AND ACETAMINOPHEN 1000 MG: 500 TABLET ORAL at 10:07

## 2025-02-16 ASSESSMENT — PAIN DESCRIPTION - ORIENTATION: ORIENTATION: RIGHT;LEFT

## 2025-02-16 ASSESSMENT — PAIN DESCRIPTION - DIRECTION: RADIATING_TOWARDS: NO

## 2025-02-16 ASSESSMENT — PAIN DESCRIPTION - PAIN TYPE: TYPE: ACUTE PAIN

## 2025-02-16 ASSESSMENT — PAIN SCALES - GENERAL
PAINLEVEL_OUTOF10: 0
PAINLEVEL_OUTOF10: 5

## 2025-02-16 ASSESSMENT — PAIN DESCRIPTION - LOCATION: LOCATION: NECK

## 2025-02-16 ASSESSMENT — PAIN - FUNCTIONAL ASSESSMENT: PAIN_FUNCTIONAL_ASSESSMENT: PREVENTS OR INTERFERES SOME ACTIVE ACTIVITIES AND ADLS

## 2025-02-16 ASSESSMENT — PAIN DESCRIPTION - FREQUENCY: FREQUENCY: CONTINUOUS

## 2025-02-16 ASSESSMENT — PAIN DESCRIPTION - ONSET: ONSET: ON-GOING

## 2025-02-16 ASSESSMENT — PAIN DESCRIPTION - DESCRIPTORS: DESCRIPTORS: ACHING

## 2025-02-16 NOTE — PROGRESS NOTES
Discharge instructions completed with patient. Patient has no further questions at this time. Patient is excited about discharge. Patient IV and telemetry have been removed.

## 2025-02-16 NOTE — DISCHARGE INSTRUCTIONS
HOSPITALIST DISCHARGE INSTRUCTIONS    NAME: Josue Lockwood   :  1950   MRN:  [id number]     Date/Time:  2025 11:17 AM    ADMIT DATE: 2025   DISCHARGE DATE: 2025     Syncope possible vasovagal in origin   Ground-level fall  Acute on chronic pain   Shoulder bilat pain  scapula pain bilat  Fatigue  Acute on chronic pain  Hypercholesterolemia    It is important that you take the medication exactly as they are prescribed.   Keep your medication in the bottles provided by the pharmacist and keep a list of the medication names, dosages, and times to be taken in your wallet.   Do not take other medications without consulting your doctor.       What to do at Home    Recommended diet:  cardiac diet    Recommended activity: activity as tolerated      If you have questions regarding the hospital related prescriptions or hospital related issues please call [facility]' office at . You can always direct your questions to your primary care doctor if you are unable to reach your hospital physician; your PCP works as an extension of your hospital doctor just like your hospital doctor is an extension of your PCP for your time at the hospital ([facility])    If you experience any of the following symptoms then please call your primary care physician or return to the emergency room if you cannot get hold of your doctor:    Fever, chills, nausea, vomiting, or persistent diarrhea  Worsening weakness or new problems with your speech or balance  Dark stools or visible blood in your stools  New Leg swelling or shortness of breath as these could be signs of a clot    Additional Instructions:      Bring these papers with you to your follow up appointments. The papers will help your doctors be sure to continue the care plan from the hospital.              Information obtained by :  I understand that if any problems occur once I am at home I am to contact my physician.    I understand and

## 2025-02-16 NOTE — CONSULTS
Chart reviewed    Pt known to me     Does not sound like cardiac syncope.     Agree with volume repletion    Resume cardiac meds    Can f/u with me in office

## 2025-02-16 NOTE — DISCHARGE SUMMARY
Discharge Summary    Name: Josue Lockwood  [id number]  YOB: 1950 (Age: 75 y.o.)   Date of Admission: 2/14/2025  Date of Discharge: 2/16/2025  Attending Physician: Mo Ramos MD    Discharge Diagnosis:   Syncope likely from orthostatic/vasovagal  Ground-level fall  Acute on chronic pain   Shoulder bilat pain  scapula pain bilat  Fatigue  Acute on chronic pain  Hypercholesterolemia    Consultations:  IP CONSULT TO PHARMACY  IP CONSULT TO CARDIOLOGY      Brief Admission History/Reason for Admission Per Gabriella Drake MD:     Josue Lockwood is a 75 y.o.  male with PMHx significant for chronic diseases listed below presents to the ED via EMS with complaints of weakness bilateral shoulder /scapula pain,  upper back and neck pain this morning patient states that when he sat down he went to move forward and felt like he was going to pass out.  He stated up attempted to walk 3 to 4 feet and suddenly collapsed, his brother was there to help catch the fall patient states he hit his hand which hit his face .  On exam patient said no right in bed vital signs are stable appears to be uncomfortable he was previously given morphine states he felt very little response will medicate with Toradol 30 mg x 1 .  Daughter who was present  states that her uncle who lives with patient was experiencing GI symptoms nausea , emesis , and diarrhea,  patient notes today he had episodes of diarrhea , denies fevers or chills.   Brief Hospital Course by Main Problems:   Syncope possible vasovagal in origin   Ground-level fall  Acute on chronic pain   Shoulder bilat pain  scapula pain bilat  Fatigue     Hx: Patient reported feeling very hot all of a sudden while sitting on couch, reported transient and few second loss of consciousness.  No confusion after the loss of consciousness  EKG normal sinus rhythm  CXR: Normal PA and lateral chest views.  CTA Chest: Atherosclerotic vascular change.

## 2025-02-16 NOTE — PLAN OF CARE
Problem: Discharge Planning  Goal: Discharge to home or other facility with appropriate resources  Outcome: Progressing  Flowsheets (Taken 2/15/2025 2030)  Discharge to home or other facility with appropriate resources: Identify barriers to discharge with patient and caregiver     Problem: Pain  Goal: Verbalizes/displays adequate comfort level or baseline comfort level  Outcome: Progressing

## 2025-02-16 NOTE — PROGRESS NOTES
End of Shift Note    Bedside shift change report given to CANDICE Mandujano (oncoming nurse) by MEG SULLIVAN RN (offgoing nurse).  Report included the following information SBAR, Kardex, and MAR    Shift worked:  7p-7a     Shift summary and any significant changes:     AAOx4, VSS, pain medication given per MAR, safety rounding completed.     Concerns for physician to address:  no     Zone phone for oncoming shift:   none       Activity:  Level of Assistance: Independent    Cardiac:   Cardiac Monitoring:  yes    Access:  Current line(s): PIV     Genitourinary:        Respiratory:   O2 Device: None (Room air)    GI:  Current diet: ADULT DIET; Regular    Pain Management:   Patient states pain is manageable on current regimen: YES    Skin:  Messi Scale Score: 19  Interventions:    Pressure injury: no    Patient Safety:  Fall Score: Marrero Total Score: 60  Fall Risk Interventions  Nursing Judgement-Fall Risk High(Add Comments): No  Toilet Every 2 Hours-In Advance of Need: Yes  Hourly Visual Checks: Awake, In bed  Fall Visual Posted: Armband, Fall sign posted, Socks  Room Door Open: Yes  Alarm On: Bed  Patient Moved Closer to Nursing Station: No    Active Consults:  IP CONSULT TO PHARMACY  IP CONSULT TO CARDIOLOGY    Length of Stay:  Expected LOS: 2  Actual LOS: 1      MEG SULLIVAN RN

## 2025-02-17 ENCOUNTER — TELEPHONE (OUTPATIENT)
Age: 75
End: 2025-02-17

## 2025-02-17 NOTE — TELEPHONE ENCOUNTER
Pt wants to know if the 3-3-25 appt is ok for HFU?    Please return call to let him know.  Thanks.

## 2025-02-17 NOTE — TELEPHONE ENCOUNTER
Care Transitions Initial Follow Up Call    Outreach made within 2 business days of discharge: Yes    Patient: Josue Lockwood Patient : 1950   MRN: [id number]  Reason for Admission: Syncope and collapse   Discharge Date: 25       Spoke with: Left message to schedule hosp f/up     Discharge department/facility: [facility]    Scheduled appointment with PCP within 7-14 days    Follow Up  Future Appointments   Date Time Provider Department Center   3/3/2025  1:30 PM Kameron Bell MD [facility] DEP       Petra Romano

## 2025-02-18 NOTE — TELEPHONE ENCOUNTER
Spoke to pt to let him know that Petra called to get him scheduled for a hosp f/u. Pt would like to just keep 03/03/2025 appt. - PD

## 2025-03-03 ENCOUNTER — OFFICE VISIT (OUTPATIENT)
Age: 75
End: 2025-03-03
Payer: MEDICARE

## 2025-03-03 VITALS
HEART RATE: 67 BPM | OXYGEN SATURATION: 99 % | SYSTOLIC BLOOD PRESSURE: 119 MMHG | BODY MASS INDEX: 23.16 KG/M2 | DIASTOLIC BLOOD PRESSURE: 71 MMHG | HEIGHT: 72 IN | WEIGHT: 171 LBS | RESPIRATION RATE: 20 BRPM | TEMPERATURE: 98.2 F

## 2025-03-03 DIAGNOSIS — D64.9 ANEMIA, UNSPECIFIED TYPE: ICD-10-CM

## 2025-03-03 DIAGNOSIS — I25.10 CORONARY ARTERY DISEASE INVOLVING NATIVE CORONARY ARTERY OF NATIVE HEART WITHOUT ANGINA PECTORIS: Primary | ICD-10-CM

## 2025-03-03 DIAGNOSIS — E78.00 HYPERCHOLESTEROLEMIA: ICD-10-CM

## 2025-03-03 DIAGNOSIS — F51.01 PRIMARY INSOMNIA: ICD-10-CM

## 2025-03-03 PROCEDURE — G8427 DOCREV CUR MEDS BY ELIG CLIN: HCPCS | Performed by: STUDENT IN AN ORGANIZED HEALTH CARE EDUCATION/TRAINING PROGRAM

## 2025-03-03 PROCEDURE — 1159F MED LIST DOCD IN RCRD: CPT | Performed by: STUDENT IN AN ORGANIZED HEALTH CARE EDUCATION/TRAINING PROGRAM

## 2025-03-03 PROCEDURE — 99214 OFFICE O/P EST MOD 30 MIN: CPT | Performed by: STUDENT IN AN ORGANIZED HEALTH CARE EDUCATION/TRAINING PROGRAM

## 2025-03-03 PROCEDURE — G8420 CALC BMI NORM PARAMETERS: HCPCS | Performed by: STUDENT IN AN ORGANIZED HEALTH CARE EDUCATION/TRAINING PROGRAM

## 2025-03-03 PROCEDURE — 1123F ACP DISCUSS/DSCN MKR DOCD: CPT | Performed by: STUDENT IN AN ORGANIZED HEALTH CARE EDUCATION/TRAINING PROGRAM

## 2025-03-03 PROCEDURE — 1036F TOBACCO NON-USER: CPT | Performed by: STUDENT IN AN ORGANIZED HEALTH CARE EDUCATION/TRAINING PROGRAM

## 2025-03-03 ASSESSMENT — PATIENT HEALTH QUESTIONNAIRE - PHQ9
SUM OF ALL RESPONSES TO PHQ QUESTIONS 1-9: 0
SUM OF ALL RESPONSES TO PHQ QUESTIONS 1-9: 0
1. LITTLE INTEREST OR PLEASURE IN DOING THINGS: NOT AT ALL
2. FEELING DOWN, DEPRESSED OR HOPELESS: NOT AT ALL
SUM OF ALL RESPONSES TO PHQ QUESTIONS 1-9: 0
SUM OF ALL RESPONSES TO PHQ QUESTIONS 1-9: 0

## 2025-03-03 NOTE — PROGRESS NOTES
\"Have you been to the ER, urgent care clinic since your last visit?  Hospitalized since your last visit?\"    [facility]//ED// 02/14--02/16/25// back and shoulder pn/ light headed    “Have you seen or consulted any other health care providers outside our system since your last visit?”    [facility]/ Dr. Leonard// arthritis// 02/2025         
    secukinumab (COSENTYX) 150 MG/ML SOAJ, Inject into the skin every 30 days, Disp: , Rfl:     LABS:       ASSESSMENT and PLAN  1. Coronary artery disease involving native coronary artery of native heart without angina pectoris  Overview:  Status post stents ×3      Orders:  -     CBC; Future  2. Primary insomnia  3. Hypercholesterolemia  -     Comprehensive Metabolic Panel; Future  -     Lipid Panel; Future  4. Anemia, unspecified type  -     CBC; Future    Blood pressure and cholesterol have been well-controlled with current regimen, will plan to continue pending workup above.    Slightly anemic on most recent lab work, will confirm resolution.    Follow up:  Return in about 6 months (around 9/3/2025) for MAWV.    Kameron Bell MD

## 2025-03-04 LAB
ALBUMIN SERPL-MCNC: 3.9 G/DL (ref 3.5–5)
ALBUMIN/GLOB SERPL: 1.3 (ref 1.1–2.2)
ALP SERPL-CCNC: 114 U/L (ref 45–117)
ALT SERPL-CCNC: 34 U/L (ref 12–78)
ANION GAP SERPL CALC-SCNC: 3 MMOL/L (ref 2–12)
AST SERPL-CCNC: 16 U/L (ref 15–37)
BILIRUB SERPL-MCNC: 1.1 MG/DL (ref 0.2–1)
BUN SERPL-MCNC: 15 MG/DL (ref 6–20)
BUN/CREAT SERPL: 15 (ref 12–20)
CALCIUM SERPL-MCNC: 9.4 MG/DL (ref 8.5–10.1)
CHLORIDE SERPL-SCNC: 105 MMOL/L (ref 97–108)
CHOLEST SERPL-MCNC: 103 MG/DL
CO2 SERPL-SCNC: 31 MMOL/L (ref 21–32)
CREAT SERPL-MCNC: 0.97 MG/DL (ref 0.7–1.3)
ERYTHROCYTE [DISTWIDTH] IN BLOOD BY AUTOMATED COUNT: 12.6 % (ref 11.5–14.5)
GLOBULIN SER CALC-MCNC: 3.1 G/DL (ref 2–4)
GLUCOSE SERPL-MCNC: 102 MG/DL (ref 65–100)
HCT VFR BLD AUTO: 39.2 % (ref 36.6–50.3)
HDLC SERPL-MCNC: 47 MG/DL
HDLC SERPL: 2.2 (ref 0–5)
HGB BLD-MCNC: 13 G/DL (ref 12.1–17)
LDLC SERPL CALC-MCNC: 33 MG/DL (ref 0–100)
MCH RBC QN AUTO: 29.1 PG (ref 26–34)
MCHC RBC AUTO-ENTMCNC: 33.2 G/DL (ref 30–36.5)
MCV RBC AUTO: 87.9 FL (ref 80–99)
NRBC # BLD: 0 K/UL (ref 0–0.01)
NRBC BLD-RTO: 0 PER 100 WBC
PLATELET # BLD AUTO: 210 K/UL (ref 150–400)
PMV BLD AUTO: 11.9 FL (ref 8.9–12.9)
POTASSIUM SERPL-SCNC: 4.2 MMOL/L (ref 3.5–5.1)
PROT SERPL-MCNC: 7 G/DL (ref 6.4–8.2)
RBC # BLD AUTO: 4.46 M/UL (ref 4.1–5.7)
SODIUM SERPL-SCNC: 139 MMOL/L (ref 136–145)
TRIGL SERPL-MCNC: 115 MG/DL
VLDLC SERPL CALC-MCNC: 23 MG/DL
WBC # BLD AUTO: 4.8 K/UL (ref 4.1–11.1)

## 2025-04-07 DIAGNOSIS — G47.09 OTHER INSOMNIA: ICD-10-CM

## 2025-04-07 RX ORDER — ZOLPIDEM TARTRATE 10 MG/1
TABLET ORAL
Qty: 15 TABLET | Refills: 2 | Status: SHIPPED | OUTPATIENT
Start: 2025-04-07 | End: 2025-07-06

## 2025-07-07 DIAGNOSIS — G47.09 OTHER INSOMNIA: ICD-10-CM

## 2025-07-07 RX ORDER — ZOLPIDEM TARTRATE 10 MG/1
TABLET ORAL
Qty: 15 TABLET | Refills: 5 | Status: SHIPPED | OUTPATIENT
Start: 2025-07-07 | End: 2026-01-03

## 2025-08-20 ENCOUNTER — HOSPITAL ENCOUNTER (INPATIENT)
Facility: HOSPITAL | Age: 75
LOS: 2 days | Discharge: HOME | DRG: 303 | End: 2025-08-22
Attending: EMERGENCY MEDICINE | Admitting: FAMILY MEDICINE
Payer: MEDICARE

## 2025-08-20 ENCOUNTER — APPOINTMENT (OUTPATIENT)
Facility: HOSPITAL | Age: 75
DRG: 303 | End: 2025-08-20
Payer: MEDICARE

## 2025-08-20 DIAGNOSIS — R07.9 EXERTIONAL CHEST PAIN: Primary | ICD-10-CM

## 2025-08-20 DIAGNOSIS — R07.9 CHEST PAIN, UNSPECIFIED TYPE: ICD-10-CM

## 2025-08-20 LAB
ALBUMIN SERPL-MCNC: 4.2 G/DL (ref 3.5–5.2)
ALBUMIN/GLOB SERPL: 1.5 (ref 1.1–2.2)
ALP SERPL-CCNC: 83 U/L (ref 40–129)
ALT SERPL-CCNC: 15 U/L (ref 10–50)
ANION GAP SERPL CALC-SCNC: 10 MMOL/L (ref 2–14)
AST SERPL-CCNC: 16 U/L (ref 10–50)
BASOPHILS # BLD: 0.04 K/UL (ref 0–0.1)
BASOPHILS NFR BLD: 0.8 % (ref 0–1)
BILIRUB SERPL-MCNC: 1.3 MG/DL (ref 0–1.2)
BUN SERPL-MCNC: 11 MG/DL (ref 8–23)
BUN/CREAT SERPL: 12 (ref 12–20)
CALCIUM SERPL-MCNC: 10.3 MG/DL (ref 8.8–10.2)
CHLORIDE SERPL-SCNC: 100 MMOL/L (ref 98–107)
CO2 SERPL-SCNC: 28 MMOL/L (ref 20–29)
CREAT SERPL-MCNC: 0.88 MG/DL (ref 0.7–1.2)
D DIMER PPP FEU-MCNC: 0.26 MG/L FEU (ref 0–0.65)
DIFFERENTIAL METHOD BLD: NORMAL
EOSINOPHIL # BLD: 0.05 K/UL (ref 0–0.4)
EOSINOPHIL NFR BLD: 1 % (ref 0–7)
ERYTHROCYTE [DISTWIDTH] IN BLOOD BY AUTOMATED COUNT: 12.6 % (ref 11.5–14.5)
GLOBULIN SER CALC-MCNC: 2.7 G/DL (ref 2–4)
GLUCOSE SERPL-MCNC: 99 MG/DL (ref 65–100)
HCT VFR BLD AUTO: 38.2 % (ref 36.6–50.3)
HGB BLD-MCNC: 13.4 G/DL (ref 12.1–17)
IMM GRANULOCYTES # BLD AUTO: 0.01 K/UL (ref 0–0.04)
IMM GRANULOCYTES NFR BLD AUTO: 0.2 % (ref 0–0.5)
LYMPHOCYTES # BLD: 1.65 K/UL (ref 0.8–3.5)
LYMPHOCYTES NFR BLD: 32.6 % (ref 12–49)
MCH RBC QN AUTO: 30.8 PG (ref 26–34)
MCHC RBC AUTO-ENTMCNC: 35.1 G/DL (ref 30–36.5)
MCV RBC AUTO: 87.8 FL (ref 80–99)
MONOCYTES # BLD: 0.55 K/UL (ref 0–1)
MONOCYTES NFR BLD: 10.9 % (ref 5–13)
NEUTS SEG # BLD: 2.76 K/UL (ref 1.8–8)
NEUTS SEG NFR BLD: 54.5 % (ref 32–75)
NRBC # BLD: 0 K/UL (ref 0–0.01)
NRBC BLD-RTO: 0 PER 100 WBC
PLATELET # BLD AUTO: 162 K/UL (ref 150–400)
PMV BLD AUTO: 10.9 FL (ref 8.9–12.9)
POTASSIUM SERPL-SCNC: 3.9 MMOL/L (ref 3.5–5.1)
PROT SERPL-MCNC: 6.9 G/DL (ref 6.4–8.3)
RBC # BLD AUTO: 4.35 M/UL (ref 4.1–5.7)
SODIUM SERPL-SCNC: 138 MMOL/L (ref 136–145)
TROPONIN T SERPL HS-MCNC: 7.8 NG/L (ref 0–22)
TROPONIN T SERPL HS-MCNC: 7.9 NG/L (ref 0–22)
WBC # BLD AUTO: 5.1 K/UL (ref 4.1–11.1)

## 2025-08-20 PROCEDURE — G0378 HOSPITAL OBSERVATION PER HR: HCPCS

## 2025-08-20 PROCEDURE — 6370000000 HC RX 637 (ALT 250 FOR IP): Performed by: FAMILY MEDICINE

## 2025-08-20 PROCEDURE — 36415 COLL VENOUS BLD VENIPUNCTURE: CPT

## 2025-08-20 PROCEDURE — 2500000003 HC RX 250 WO HCPCS: Performed by: FAMILY MEDICINE

## 2025-08-20 PROCEDURE — 80053 COMPREHEN METABOLIC PANEL: CPT

## 2025-08-20 PROCEDURE — 93005 ELECTROCARDIOGRAM TRACING: CPT | Performed by: EMERGENCY MEDICINE

## 2025-08-20 PROCEDURE — 71045 X-RAY EXAM CHEST 1 VIEW: CPT

## 2025-08-20 PROCEDURE — 99285 EMERGENCY DEPT VISIT HI MDM: CPT

## 2025-08-20 PROCEDURE — 1100000003 HC PRIVATE W/ TELEMETRY

## 2025-08-20 PROCEDURE — 85025 COMPLETE CBC W/AUTO DIFF WBC: CPT

## 2025-08-20 PROCEDURE — 84484 ASSAY OF TROPONIN QUANT: CPT

## 2025-08-20 PROCEDURE — 85379 FIBRIN DEGRADATION QUANT: CPT

## 2025-08-20 RX ORDER — ATORVASTATIN CALCIUM 20 MG/1
20 TABLET, FILM COATED ORAL DAILY
Status: DISCONTINUED | OUTPATIENT
Start: 2025-08-20 | End: 2025-08-22 | Stop reason: HOSPADM

## 2025-08-20 RX ORDER — POTASSIUM CHLORIDE 1500 MG/1
40 TABLET, EXTENDED RELEASE ORAL PRN
Status: DISCONTINUED | OUTPATIENT
Start: 2025-08-20 | End: 2025-08-21

## 2025-08-20 RX ORDER — FINASTERIDE 5 MG/1
5 TABLET, FILM COATED ORAL DAILY
Status: DISCONTINUED | OUTPATIENT
Start: 2025-08-20 | End: 2025-08-22 | Stop reason: HOSPADM

## 2025-08-20 RX ORDER — FOLIC ACID 1 MG/1
1 TABLET ORAL DAILY
Status: DISCONTINUED | OUTPATIENT
Start: 2025-08-20 | End: 2025-08-22 | Stop reason: HOSPADM

## 2025-08-20 RX ORDER — LATANOPROST 50 UG/ML
1 SOLUTION/ DROPS OPHTHALMIC NIGHTLY
Status: DISCONTINUED | OUTPATIENT
Start: 2025-08-20 | End: 2025-08-22 | Stop reason: HOSPADM

## 2025-08-20 RX ORDER — MAGNESIUM SULFATE IN WATER 40 MG/ML
2000 INJECTION, SOLUTION INTRAVENOUS PRN
Status: DISCONTINUED | OUTPATIENT
Start: 2025-08-20 | End: 2025-08-21

## 2025-08-20 RX ORDER — ONDANSETRON 2 MG/ML
4 INJECTION INTRAMUSCULAR; INTRAVENOUS EVERY 6 HOURS PRN
Status: DISCONTINUED | OUTPATIENT
Start: 2025-08-20 | End: 2025-08-22 | Stop reason: HOSPADM

## 2025-08-20 RX ORDER — VITAMIN B COMPLEX
5000 TABLET ORAL DAILY
Status: DISCONTINUED | OUTPATIENT
Start: 2025-08-21 | End: 2025-08-22 | Stop reason: HOSPADM

## 2025-08-20 RX ORDER — SODIUM CHLORIDE 0.9 % (FLUSH) 0.9 %
5-40 SYRINGE (ML) INJECTION EVERY 12 HOURS SCHEDULED
Status: DISCONTINUED | OUTPATIENT
Start: 2025-08-20 | End: 2025-08-22 | Stop reason: HOSPADM

## 2025-08-20 RX ORDER — NALTREXONE HYDROCHLORIDE 50 MG/1
50 TABLET, FILM COATED ORAL DAILY
COMMUNITY

## 2025-08-20 RX ORDER — ACETAMINOPHEN 325 MG/1
650 TABLET ORAL EVERY 6 HOURS PRN
Status: DISCONTINUED | OUTPATIENT
Start: 2025-08-20 | End: 2025-08-22 | Stop reason: HOSPADM

## 2025-08-20 RX ORDER — OXYBUTYNIN CHLORIDE 5 MG/1
10 TABLET, EXTENDED RELEASE ORAL DAILY
Status: DISCONTINUED | OUTPATIENT
Start: 2025-08-20 | End: 2025-08-22 | Stop reason: HOSPADM

## 2025-08-20 RX ORDER — ENOXAPARIN SODIUM 100 MG/ML
40 INJECTION SUBCUTANEOUS DAILY
Status: DISCONTINUED | OUTPATIENT
Start: 2025-08-21 | End: 2025-08-22 | Stop reason: HOSPADM

## 2025-08-20 RX ORDER — ZOLPIDEM TARTRATE 5 MG/1
5 TABLET ORAL NIGHTLY PRN
Status: DISCONTINUED | OUTPATIENT
Start: 2025-08-20 | End: 2025-08-22 | Stop reason: HOSPADM

## 2025-08-20 RX ORDER — SODIUM CHLORIDE 9 MG/ML
INJECTION, SOLUTION INTRAVENOUS PRN
Status: DISCONTINUED | OUTPATIENT
Start: 2025-08-20 | End: 2025-08-22 | Stop reason: HOSPADM

## 2025-08-20 RX ORDER — POTASSIUM CHLORIDE 7.45 MG/ML
10 INJECTION INTRAVENOUS PRN
Status: DISCONTINUED | OUTPATIENT
Start: 2025-08-20 | End: 2025-08-21

## 2025-08-20 RX ORDER — SODIUM CHLORIDE 0.9 % (FLUSH) 0.9 %
5-40 SYRINGE (ML) INJECTION PRN
Status: DISCONTINUED | OUTPATIENT
Start: 2025-08-20 | End: 2025-08-22 | Stop reason: HOSPADM

## 2025-08-20 RX ORDER — LISINOPRIL 5 MG/1
5 TABLET ORAL DAILY
Status: ON HOLD | COMMUNITY
End: 2025-08-22

## 2025-08-20 RX ORDER — ASPIRIN 81 MG/1
81 TABLET ORAL DAILY
Status: DISCONTINUED | OUTPATIENT
Start: 2025-08-20 | End: 2025-08-22 | Stop reason: HOSPADM

## 2025-08-20 RX ORDER — POLYETHYLENE GLYCOL 3350 17 G/17G
17 POWDER, FOR SOLUTION ORAL DAILY PRN
Status: DISCONTINUED | OUTPATIENT
Start: 2025-08-20 | End: 2025-08-22 | Stop reason: HOSPADM

## 2025-08-20 RX ORDER — ACETAMINOPHEN 650 MG/1
650 SUPPOSITORY RECTAL EVERY 6 HOURS PRN
Status: DISCONTINUED | OUTPATIENT
Start: 2025-08-20 | End: 2025-08-22 | Stop reason: HOSPADM

## 2025-08-20 RX ORDER — DOXAZOSIN 2 MG/1
4 TABLET ORAL NIGHTLY
Status: DISCONTINUED | OUTPATIENT
Start: 2025-08-20 | End: 2025-08-22 | Stop reason: HOSPADM

## 2025-08-20 RX ORDER — ASCORBIC ACID 500 MG
1000 TABLET ORAL DAILY
Status: DISCONTINUED | OUTPATIENT
Start: 2025-08-20 | End: 2025-08-22 | Stop reason: HOSPADM

## 2025-08-20 RX ORDER — ONDANSETRON 4 MG/1
4 TABLET, ORALLY DISINTEGRATING ORAL EVERY 8 HOURS PRN
Status: DISCONTINUED | OUTPATIENT
Start: 2025-08-20 | End: 2025-08-22 | Stop reason: HOSPADM

## 2025-08-20 RX ADMIN — SODIUM CHLORIDE, PRESERVATIVE FREE 5 ML: 5 INJECTION INTRAVENOUS at 21:17

## 2025-08-20 RX ADMIN — LATANOPROST 1 DROP: 50 SOLUTION OPHTHALMIC at 23:14

## 2025-08-20 RX ADMIN — OXYCODONE HYDROCHLORIDE AND ACETAMINOPHEN 1000 MG: 500 TABLET ORAL at 21:17

## 2025-08-20 RX ADMIN — ZOLPIDEM TARTRATE 5 MG: 5 TABLET ORAL at 21:17

## 2025-08-20 ASSESSMENT — PAIN SCALES - GENERAL
PAINLEVEL_OUTOF10: 3
PAINLEVEL_OUTOF10: 0

## 2025-08-20 ASSESSMENT — PAIN DESCRIPTION - ONSET: ONSET: ON-GOING

## 2025-08-20 ASSESSMENT — PAIN DESCRIPTION - DESCRIPTORS: DESCRIPTORS: ACHING

## 2025-08-20 ASSESSMENT — PAIN DESCRIPTION - ORIENTATION: ORIENTATION: LEFT

## 2025-08-20 ASSESSMENT — LIFESTYLE VARIABLES
HOW OFTEN DO YOU HAVE A DRINK CONTAINING ALCOHOL: NEVER
HOW MANY STANDARD DRINKS CONTAINING ALCOHOL DO YOU HAVE ON A TYPICAL DAY: PATIENT DOES NOT DRINK

## 2025-08-20 ASSESSMENT — PAIN - FUNCTIONAL ASSESSMENT: PAIN_FUNCTIONAL_ASSESSMENT: ACTIVITIES ARE NOT PREVENTED

## 2025-08-20 ASSESSMENT — PAIN DESCRIPTION - PAIN TYPE: TYPE: ACUTE PAIN

## 2025-08-20 ASSESSMENT — PAIN DESCRIPTION - LOCATION: LOCATION: CHEST

## 2025-08-20 ASSESSMENT — PAIN DESCRIPTION - FREQUENCY: FREQUENCY: INTERMITTENT

## 2025-08-21 ENCOUNTER — APPOINTMENT (OUTPATIENT)
Facility: HOSPITAL | Age: 75
DRG: 303 | End: 2025-08-21
Attending: FAMILY MEDICINE
Payer: MEDICARE

## 2025-08-21 ENCOUNTER — APPOINTMENT (OUTPATIENT)
Facility: HOSPITAL | Age: 75
DRG: 303 | End: 2025-08-21
Payer: MEDICARE

## 2025-08-21 LAB
CRP SERPL-MCNC: <0.3 MG/DL (ref 0–0.5)
ECHO AO ASC DIAM: 3.9 CM
ECHO AO ASCENDING AORTA INDEX: 2 CM/M2
ECHO AO ROOT DIAM: 3.7 CM
ECHO AO ROOT INDEX: 1.9 CM/M2
ECHO AR MAX VEL PISA: 4.7 M/S
ECHO AV AREA PEAK VELOCITY: 3.2 CM2
ECHO AV AREA PEAK VELOCITY: 3.3 CM2
ECHO AV AREA PEAK VELOCITY: 3.4 CM2
ECHO AV AREA PEAK VELOCITY: 3.5 CM2
ECHO AV AREA VTI: 3.3 CM2
ECHO AV AREA/BSA VTI: 1.7 CM2/M2
ECHO AV MEAN GRADIENT: 4 MMHG
ECHO AV MEAN VELOCITY: 0.9 M/S
ECHO AV PEAK GRADIENT: 7 MMHG
ECHO AV PEAK GRADIENT: 8 MMHG
ECHO AV PEAK VELOCITY: 1.3 M/S
ECHO AV PEAK VELOCITY: 1.4 M/S
ECHO AV REGURGITANT PHT: 573.3 MS
ECHO AV VTI: 33 CM
ECHO BSA: 1.94 M2
ECHO LA DIAMETER INDEX: 1.9 CM/M2
ECHO LA DIAMETER: 3.7 CM
ECHO LA TO AORTIC ROOT RATIO: 1
ECHO LA VOL A-L A2C: 83 ML (ref 18–58)
ECHO LA VOL A-L A4C: 58 ML (ref 18–58)
ECHO LA VOL MOD A2C: 78 ML (ref 18–58)
ECHO LA VOL MOD A4C: 50 ML (ref 18–58)
ECHO LA VOLUME AREA LENGTH: 73 ML
ECHO LA VOLUME INDEX A-L A2C: 43 ML/M2 (ref 16–34)
ECHO LA VOLUME INDEX A-L A4C: 30 ML/M2 (ref 16–34)
ECHO LA VOLUME INDEX AREA LENGTH: 37 ML/M2 (ref 16–34)
ECHO LA VOLUME INDEX MOD A2C: 40 ML/M2 (ref 16–34)
ECHO LA VOLUME INDEX MOD A4C: 26 ML/M2 (ref 16–34)
ECHO LV E' LATERAL VELOCITY: 8.86 CM/S
ECHO LV E' SEPTAL VELOCITY: 7.34 CM/S
ECHO LV EF PHYSICIAN: 70 %
ECHO LV FRACTIONAL SHORTENING: 40 % (ref 28–44)
ECHO LV INTERNAL DIMENSION DIASTOLE INDEX: 2.15 CM/M2
ECHO LV INTERNAL DIMENSION DIASTOLIC: 4.2 CM (ref 4.2–5.9)
ECHO LV INTERNAL DIMENSION SYSTOLIC INDEX: 1.28 CM/M2
ECHO LV INTERNAL DIMENSION SYSTOLIC: 2.5 CM
ECHO LV ISOVOLUMETRIC RELAXATION TIME (IVRT): 123.7 MS
ECHO LV IVSD: 1.2 CM (ref 0.6–1)
ECHO LV MASS 2D: 178.2 G (ref 88–224)
ECHO LV MASS INDEX 2D: 91.4 G/M2 (ref 49–115)
ECHO LV POSTERIOR WALL DIASTOLIC: 1.2 CM (ref 0.6–1)
ECHO LV RELATIVE WALL THICKNESS RATIO: 0.57
ECHO LVOT AREA: 3.5 CM2
ECHO LVOT AV VTI INDEX: 0.99
ECHO LVOT DIAM: 2.1 CM
ECHO LVOT MEAN GRADIENT: 4 MMHG
ECHO LVOT PEAK GRADIENT: 7 MMHG
ECHO LVOT PEAK GRADIENT: 7 MMHG
ECHO LVOT PEAK VELOCITY: 1.4 M/S
ECHO LVOT PEAK VELOCITY: 1.4 M/S
ECHO LVOT STROKE VOLUME INDEX: 57.9 ML/M2
ECHO LVOT SV: 112.9 ML
ECHO LVOT VTI: 32.6 CM
ECHO MV A VELOCITY: 0.74 M/S
ECHO MV E DECELERATION TIME (DT): 222.4 MS
ECHO MV E VELOCITY: 0.72 M/S
ECHO MV E/A RATIO: 0.97
ECHO MV E/E' LATERAL: 8.13
ECHO MV E/E' RATIO (AVERAGED): 8.97
ECHO MV E/E' SEPTAL: 9.81
ECHO RV FREE WALL PEAK S': 14.3 CM/S
ECHO RV LONGITUDINAL DIMENSION: 3.6 CM
ECHO RV MID DIMENSION: 3.2 CM
ECHO RV TAPSE: 2.7 CM (ref 1.7–?)
ECHO TV REGURGITANT MAX VELOCITY: 2.89 M/S
ECHO TV REGURGITANT PEAK GRADIENT: 34 MMHG
ERYTHROCYTE [SEDIMENTATION RATE] IN BLOOD: 5 MM/HR (ref 0–20)

## 2025-08-21 PROCEDURE — 2500000003 HC RX 250 WO HCPCS: Performed by: FAMILY MEDICINE

## 2025-08-21 PROCEDURE — 1100000003 HC PRIVATE W/ TELEMETRY

## 2025-08-21 PROCEDURE — 2580000003 HC RX 258

## 2025-08-21 PROCEDURE — 86140 C-REACTIVE PROTEIN: CPT

## 2025-08-21 PROCEDURE — 93306 TTE W/DOPPLER COMPLETE: CPT

## 2025-08-21 PROCEDURE — 96372 THER/PROPH/DIAG INJ SC/IM: CPT

## 2025-08-21 PROCEDURE — 3430000000 HC RX DIAGNOSTIC RADIOPHARMACEUTICAL: Performed by: NURSE PRACTITIONER

## 2025-08-21 PROCEDURE — 6360000002 HC RX W HCPCS: Performed by: FAMILY MEDICINE

## 2025-08-21 PROCEDURE — 6370000000 HC RX 637 (ALT 250 FOR IP): Performed by: INTERNAL MEDICINE

## 2025-08-21 PROCEDURE — A9500 TC99M SESTAMIBI: HCPCS | Performed by: NURSE PRACTITIONER

## 2025-08-21 PROCEDURE — 36415 COLL VENOUS BLD VENIPUNCTURE: CPT

## 2025-08-21 PROCEDURE — 6370000000 HC RX 637 (ALT 250 FOR IP): Performed by: NURSE PRACTITIONER

## 2025-08-21 PROCEDURE — 6370000000 HC RX 637 (ALT 250 FOR IP): Performed by: FAMILY MEDICINE

## 2025-08-21 PROCEDURE — 6360000002 HC RX W HCPCS: Performed by: INTERNAL MEDICINE

## 2025-08-21 PROCEDURE — G0378 HOSPITAL OBSERVATION PER HR: HCPCS

## 2025-08-21 PROCEDURE — 85652 RBC SED RATE AUTOMATED: CPT

## 2025-08-21 PROCEDURE — 78452 HT MUSCLE IMAGE SPECT MULT: CPT

## 2025-08-21 PROCEDURE — 93017 CV STRESS TEST TRACING ONLY: CPT

## 2025-08-21 RX ORDER — REGADENOSON 0.08 MG/ML
0.4 INJECTION, SOLUTION INTRAVENOUS
Status: COMPLETED | OUTPATIENT
Start: 2025-08-21 | End: 2025-08-21

## 2025-08-21 RX ORDER — LISINOPRIL 5 MG/1
2.5 TABLET ORAL DAILY
Status: DISCONTINUED | OUTPATIENT
Start: 2025-08-22 | End: 2025-08-22 | Stop reason: HOSPADM

## 2025-08-21 RX ORDER — CLONIDINE HYDROCHLORIDE 0.1 MG/1
TABLET ORAL
Status: ON HOLD | COMMUNITY
Start: 2025-07-23 | End: 2025-08-22 | Stop reason: HOSPADM

## 2025-08-21 RX ORDER — METHOTREXATE 2.5 MG/1
15 TABLET ORAL WEEKLY
Status: DISCONTINUED | OUTPATIENT
Start: 2025-08-22 | End: 2025-08-22 | Stop reason: HOSPADM

## 2025-08-21 RX ORDER — SODIUM CHLORIDE, SODIUM LACTATE, POTASSIUM CHLORIDE, AND CALCIUM CHLORIDE .6; .31; .03; .02 G/100ML; G/100ML; G/100ML; G/100ML
500 INJECTION, SOLUTION INTRAVENOUS ONCE
Status: COMPLETED | OUTPATIENT
Start: 2025-08-21 | End: 2025-08-21

## 2025-08-21 RX ORDER — LOPERAMIDE HYDROCHLORIDE 2 MG/1
CAPSULE ORAL
COMMUNITY
Start: 2025-08-12

## 2025-08-21 RX ORDER — TETRAKIS(2-METHOXYISOBUTYLISOCYANIDE)COPPER(I) TETRAFLUOROBORATE 1 MG/ML
32.7 INJECTION, POWDER, LYOPHILIZED, FOR SOLUTION INTRAVENOUS
Status: COMPLETED | OUTPATIENT
Start: 2025-08-21 | End: 2025-08-21

## 2025-08-21 RX ORDER — LISINOPRIL 5 MG/1
5 TABLET ORAL DAILY
Status: DISCONTINUED | OUTPATIENT
Start: 2025-08-21 | End: 2025-08-21

## 2025-08-21 RX ORDER — METOPROLOL TARTRATE 25 MG/1
25 TABLET, FILM COATED ORAL 2 TIMES DAILY
Status: DISCONTINUED | OUTPATIENT
Start: 2025-08-21 | End: 2025-08-22 | Stop reason: HOSPADM

## 2025-08-21 RX ADMIN — FOLIC ACID 1 MG: 1 TABLET ORAL at 08:52

## 2025-08-21 RX ADMIN — OXYCODONE HYDROCHLORIDE AND ACETAMINOPHEN 1000 MG: 500 TABLET ORAL at 08:52

## 2025-08-21 RX ADMIN — METOPROLOL TARTRATE 25 MG: 25 TABLET, FILM COATED ORAL at 16:52

## 2025-08-21 RX ADMIN — LATANOPROST 1 DROP: 50 SOLUTION OPHTHALMIC at 21:07

## 2025-08-21 RX ADMIN — ASPIRIN 81 MG: 81 TABLET, DELAYED RELEASE ORAL at 08:52

## 2025-08-21 RX ADMIN — ZOLPIDEM TARTRATE 5 MG: 5 TABLET ORAL at 22:40

## 2025-08-21 RX ADMIN — SODIUM CHLORIDE, PRESERVATIVE FREE 10 ML: 5 INJECTION INTRAVENOUS at 21:07

## 2025-08-21 RX ADMIN — Medication 5000 UNITS: at 08:52

## 2025-08-21 RX ADMIN — LISINOPRIL 5 MG: 5 TABLET ORAL at 09:59

## 2025-08-21 RX ADMIN — SODIUM CHLORIDE, PRESERVATIVE FREE 10 ML: 5 INJECTION INTRAVENOUS at 08:53

## 2025-08-21 RX ADMIN — ATORVASTATIN CALCIUM 20 MG: 20 TABLET, FILM COATED ORAL at 08:52

## 2025-08-21 RX ADMIN — FINASTERIDE 5 MG: 5 TABLET, FILM COATED ORAL at 08:52

## 2025-08-21 RX ADMIN — OXYBUTYNIN CHLORIDE 10 MG: 5 TABLET, EXTENDED RELEASE ORAL at 08:52

## 2025-08-21 RX ADMIN — REGADENOSON 0.4 MG: 0.08 INJECTION, SOLUTION INTRAVENOUS at 14:40

## 2025-08-21 RX ADMIN — TECHNETIUM TC-99M SESTAMIBI 32.7 MILLICURIE: 1 INJECTION INTRAVENOUS at 14:40

## 2025-08-21 RX ADMIN — SODIUM CHLORIDE, SODIUM LACTATE, POTASSIUM CHLORIDE, AND CALCIUM CHLORIDE 500 ML: .6; .31; .03; .02 INJECTION, SOLUTION INTRAVENOUS at 21:06

## 2025-08-21 RX ADMIN — ENOXAPARIN SODIUM 40 MG: 100 INJECTION SUBCUTANEOUS at 08:52

## 2025-08-21 ASSESSMENT — PAIN SCALES - GENERAL
PAINLEVEL_OUTOF10: 0

## 2025-08-22 VITALS
SYSTOLIC BLOOD PRESSURE: 145 MMHG | RESPIRATION RATE: 18 BRPM | BODY MASS INDEX: 22.13 KG/M2 | WEIGHT: 163.36 LBS | DIASTOLIC BLOOD PRESSURE: 65 MMHG | HEART RATE: 56 BPM | TEMPERATURE: 98 F | HEIGHT: 72 IN | OXYGEN SATURATION: 96 %

## 2025-08-22 LAB
ECHO BSA: 1.94 M2
EKG DIAGNOSIS: NORMAL
STRESS BASELINE DIAS BP: 74 MMHG
STRESS BASELINE HR: 72 BPM
STRESS BASELINE ST DEPRESSION: 0 MM
STRESS BASELINE SYS BP: 137 MMHG
STRESS ESTIMATED WORKLOAD: 1 METS
STRESS PEAK DIAS BP: 65 MMHG
STRESS PEAK SYS BP: 141 MMHG
STRESS PERCENT HR ACHIEVED: 86 %
STRESS POST PEAK HR: 123 BPM
STRESS RATE PRESSURE PRODUCT: NORMAL BPM*MMHG
STRESS TARGET HR: 143 BPM

## 2025-08-22 PROCEDURE — G0378 HOSPITAL OBSERVATION PER HR: HCPCS

## 2025-08-22 PROCEDURE — A9500 TC99M SESTAMIBI: HCPCS | Performed by: NURSE PRACTITIONER

## 2025-08-22 PROCEDURE — 3430000000 HC RX DIAGNOSTIC RADIOPHARMACEUTICAL: Performed by: NURSE PRACTITIONER

## 2025-08-22 PROCEDURE — 6370000000 HC RX 637 (ALT 250 FOR IP): Performed by: FAMILY MEDICINE

## 2025-08-22 PROCEDURE — 2500000003 HC RX 250 WO HCPCS: Performed by: FAMILY MEDICINE

## 2025-08-22 PROCEDURE — 6370000000 HC RX 637 (ALT 250 FOR IP): Performed by: NURSE PRACTITIONER

## 2025-08-22 RX ORDER — TETRAKIS(2-METHOXYISOBUTYLISOCYANIDE)COPPER(I) TETRAFLUOROBORATE 1 MG/ML
31.9 INJECTION, POWDER, LYOPHILIZED, FOR SOLUTION INTRAVENOUS
Status: COMPLETED | OUTPATIENT
Start: 2025-08-22 | End: 2025-08-22

## 2025-08-22 RX ORDER — LISINOPRIL 2.5 MG/1
2.5 TABLET ORAL DAILY
Qty: 30 TABLET | Refills: 1 | Status: SHIPPED | OUTPATIENT
Start: 2025-08-22

## 2025-08-22 RX ORDER — METOPROLOL TARTRATE 25 MG/1
25 TABLET, FILM COATED ORAL 2 TIMES DAILY
Qty: 60 TABLET | Refills: 1 | Status: SHIPPED | OUTPATIENT
Start: 2025-08-22

## 2025-08-22 RX ADMIN — TECHNETIUM TC-99M SESTAMIBI 31.9 MILLICURIE: 1 INJECTION INTRAVENOUS at 07:40

## 2025-08-22 RX ADMIN — ASPIRIN 81 MG: 81 TABLET, DELAYED RELEASE ORAL at 11:19

## 2025-08-22 RX ADMIN — OXYBUTYNIN CHLORIDE 10 MG: 5 TABLET, EXTENDED RELEASE ORAL at 11:20

## 2025-08-22 RX ADMIN — METOPROLOL TARTRATE 25 MG: 25 TABLET, FILM COATED ORAL at 11:18

## 2025-08-22 RX ADMIN — LISINOPRIL 2.5 MG: 5 TABLET ORAL at 11:19

## 2025-08-22 RX ADMIN — FINASTERIDE 5 MG: 5 TABLET, FILM COATED ORAL at 11:20

## 2025-08-22 RX ADMIN — SODIUM CHLORIDE, PRESERVATIVE FREE 10 ML: 5 INJECTION INTRAVENOUS at 12:25

## 2025-08-22 RX ADMIN — Medication 5000 UNITS: at 11:18

## 2025-08-22 ASSESSMENT — PAIN SCALES - GENERAL
PAINLEVEL_OUTOF10: 0
PAINLEVEL_OUTOF10: 0

## 2025-08-23 LAB
EKG ATRIAL RATE: 67 BPM
EKG DIAGNOSIS: NORMAL
EKG P AXIS: 76 DEGREES
EKG P-R INTERVAL: 158 MS
EKG Q-T INTERVAL: 384 MS
EKG QRS DURATION: 84 MS
EKG QTC CALCULATION (BAZETT): 405 MS
EKG R AXIS: 19 DEGREES
EKG T AXIS: 60 DEGREES
EKG VENTRICULAR RATE: 67 BPM

## 2025-08-26 ENCOUNTER — TELEMEDICINE (OUTPATIENT)
Age: 75
End: 2025-08-26

## 2025-08-26 DIAGNOSIS — I10 ESSENTIAL HYPERTENSION, BENIGN: ICD-10-CM

## 2025-08-26 DIAGNOSIS — Z09 HOSPITAL DISCHARGE FOLLOW-UP: ICD-10-CM

## 2025-08-26 DIAGNOSIS — N40.1 BPH WITH OBSTRUCTION/LOWER URINARY TRACT SYMPTOMS: ICD-10-CM

## 2025-08-26 DIAGNOSIS — E78.00 HYPERCHOLESTEROLEMIA: ICD-10-CM

## 2025-08-26 DIAGNOSIS — N13.8 BPH WITH OBSTRUCTION/LOWER URINARY TRACT SYMPTOMS: ICD-10-CM

## 2025-08-26 DIAGNOSIS — L40.50 PSORIATIC ARTHRITIS (HCC): ICD-10-CM

## 2025-08-26 DIAGNOSIS — I25.10 CORONARY ARTERY DISEASE INVOLVING NATIVE CORONARY ARTERY OF NATIVE HEART WITHOUT ANGINA PECTORIS: Primary | ICD-10-CM
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
## 2025-08-26 PROBLEM — R55 SYNCOPE AND COLLAPSE: Status: RESOLVED | Noted: 2025-02-14 | Resolved: 2025-08-26
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
## 2025-08-26 ASSESSMENT — ENCOUNTER SYMPTOMS
WHEEZING: 0
COUGH: 0
SHORTNESS OF BREATH: 0
CHEST TIGHTNESS: 0
APNEA: 0

## 2025-09-05 ENCOUNTER — OFFICE VISIT (OUTPATIENT)
Age: 75
End: 2025-09-05
Payer: MEDICARE

## 2025-09-05 VITALS
RESPIRATION RATE: 16 BRPM | WEIGHT: 170 LBS | BODY MASS INDEX: 23.03 KG/M2 | TEMPERATURE: 99.7 F | HEIGHT: 72 IN | DIASTOLIC BLOOD PRESSURE: 70 MMHG | OXYGEN SATURATION: 95 % | SYSTOLIC BLOOD PRESSURE: 130 MMHG | HEART RATE: 65 BPM

## 2025-09-05 DIAGNOSIS — I25.10 CORONARY ARTERY DISEASE INVOLVING NATIVE CORONARY ARTERY OF NATIVE HEART WITHOUT ANGINA PECTORIS: Primary | ICD-10-CM

## 2025-09-05 DIAGNOSIS — L40.50 PSORIATIC ARTHRITIS (HCC): ICD-10-CM

## 2025-09-05 DIAGNOSIS — I10 PRIMARY HYPERTENSION: ICD-10-CM

## 2025-09-05 DIAGNOSIS — E78.00 HYPERCHOLESTEROLEMIA: ICD-10-CM

## 2025-09-05 PROCEDURE — 1159F MED LIST DOCD IN RCRD: CPT | Performed by: STUDENT IN AN ORGANIZED HEALTH CARE EDUCATION/TRAINING PROGRAM

## 2025-09-05 PROCEDURE — G8427 DOCREV CUR MEDS BY ELIG CLIN: HCPCS | Performed by: STUDENT IN AN ORGANIZED HEALTH CARE EDUCATION/TRAINING PROGRAM

## 2025-09-05 PROCEDURE — 1111F DSCHRG MED/CURRENT MED MERGE: CPT | Performed by: STUDENT IN AN ORGANIZED HEALTH CARE EDUCATION/TRAINING PROGRAM

## 2025-09-05 PROCEDURE — 3078F DIAST BP <80 MM HG: CPT | Performed by: STUDENT IN AN ORGANIZED HEALTH CARE EDUCATION/TRAINING PROGRAM

## 2025-09-05 PROCEDURE — 99214 OFFICE O/P EST MOD 30 MIN: CPT | Performed by: STUDENT IN AN ORGANIZED HEALTH CARE EDUCATION/TRAINING PROGRAM

## 2025-09-05 PROCEDURE — 1036F TOBACCO NON-USER: CPT | Performed by: STUDENT IN AN ORGANIZED HEALTH CARE EDUCATION/TRAINING PROGRAM

## 2025-09-05 PROCEDURE — 1124F ACP DISCUSS-NO DSCNMKR DOCD: CPT | Performed by: STUDENT IN AN ORGANIZED HEALTH CARE EDUCATION/TRAINING PROGRAM

## 2025-09-05 PROCEDURE — G8420 CALC BMI NORM PARAMETERS: HCPCS | Performed by: STUDENT IN AN ORGANIZED HEALTH CARE EDUCATION/TRAINING PROGRAM

## 2025-09-05 PROCEDURE — 3075F SYST BP GE 130 - 139MM HG: CPT | Performed by: STUDENT IN AN ORGANIZED HEALTH CARE EDUCATION/TRAINING PROGRAM

## 2025-09-05 RX ORDER — METOPROLOL SUCCINATE 25 MG/1
25 TABLET, EXTENDED RELEASE ORAL DAILY
Qty: 90 TABLET | Refills: 1 | Status: SHIPPED | OUTPATIENT
Start: 2025-09-05

## 2025-09-05 RX ORDER — LISINOPRIL 2.5 MG/1
2.5 TABLET ORAL DAILY
Qty: 90 TABLET | Refills: 1 | Status: SHIPPED | OUTPATIENT
Start: 2025-09-05

## 2025-09-05 ASSESSMENT — PATIENT HEALTH QUESTIONNAIRE - PHQ9
SUM OF ALL RESPONSES TO PHQ QUESTIONS 1-9: 1
1. LITTLE INTEREST OR PLEASURE IN DOING THINGS: NOT AT ALL
SUM OF ALL RESPONSES TO PHQ QUESTIONS 1-9: 1
2. FEELING DOWN, DEPRESSED OR HOPELESS: SEVERAL DAYS

## (undated) DEVICE — SYR 10ML LUER LOK 1/5ML GRAD --

## (undated) DEVICE — KENDALL SCD EXPRESS SLEEVES, KNEE LENGTH, MEDIUM: Brand: KENDALL SCD

## (undated) DEVICE — CATH IV AUTOGRD BC PNK 20GA 25 -- INSYTE

## (undated) DEVICE — SUTURE VCRL SZ 2-0 L27IN ABSRB VLT L26MM SH 1/2 CIR J317H

## (undated) DEVICE — BASIN EMSIS 16OZ GRAPHITE PLAS KID SHP MOLD GRAD FOR ORAL

## (undated) DEVICE — SURGICAL PROCEDURE PACK BASIN MAJ SET CUST NO CAUT

## (undated) DEVICE — SUTURE VCRL SZ 3-0 L27IN ABSRB UD L26MM SH 1/2 CIR J416H

## (undated) DEVICE — NEEDLE HYPO 22GA L1.5IN BLK S STL HUB POLYPR SHLD REG BVL

## (undated) DEVICE — Z DISCONTINUED PER MEDLINE LINE GAS SAMPLING O2/CO2 LNG AD 13 FT NSL W/ TBNG FILTERLINE

## (undated) DEVICE — STERILE POLYISOPRENE POWDER-FREE SURGICAL GLOVES: Brand: PROTEXIS

## (undated) DEVICE — Device

## (undated) DEVICE — STRAP POS KNEE BODY VELC

## (undated) DEVICE — BLADE ASSEMB CLP HAIR FINE --

## (undated) DEVICE — GOWN,SIRUS,NONRNF,SETINSLV,2XL,18/CS: Brand: MEDLINE

## (undated) DEVICE — ROCKER SWITCH PENCIL BLADE ELECTRODE, HOLSTER: Brand: EDGE

## (undated) DEVICE — TOWEL 4 PLY TISS 19X30 SUE WHT

## (undated) DEVICE — SUTURE MCRYL SZ 4-0 L27IN ABSRB UD L19MM PS-2 1/2 CIR PRIM Y426H

## (undated) DEVICE — NEONATAL-ADULT SPO2 SENSOR: Brand: NELLCOR

## (undated) DEVICE — NEEDLE HYPO 18GA L1.5IN PNK S STL HUB POLYPR SHLD REG BVL

## (undated) DEVICE — APPLICATOR BNDG 1MM ADH PREMIERPRO EXOFIN

## (undated) DEVICE — FILTER CLP DISP FOR 5513E CLIPVAC

## (undated) DEVICE — SUT VCRL 3-0 18IN TIE MP VIO --

## (undated) DEVICE — TOWEL SURG W17XL27IN STD BLU COT NONFENESTRATED PREWASHED

## (undated) DEVICE — SYR 3ML LL TIP 1/10ML GRAD --

## (undated) DEVICE — DBD-PACK,LAPAROTOMY,2 REINFORCED GOWNS: Brand: MEDLINE

## (undated) DEVICE — SPONGE: SPECIALTY PEANUT XR 100/CS: Brand: MEDICAL ACTION INDUSTRIES

## (undated) DEVICE — PREP SKN PREVAIL 40ML APPL --

## (undated) DEVICE — REM POLYHESIVE ADULT PATIENT RETURN ELECTRODE: Brand: VALLEYLAB

## (undated) DEVICE — 1200 GUARD II KIT W/5MM TUBE W/O VAC TUBE: Brand: GUARDIAN

## (undated) DEVICE — SET ADMIN 16ML TBNG L100IN 2 Y INJ SITE IV PIGGY BK DISP

## (undated) DEVICE — SUTURE PDS II SZ 2-0 L27IN ABSRB VLT SH L26MM 1/2 CIR Z317H

## (undated) DEVICE — KENDALL RADIOLUCENT FOAM MONITORING ELECTRODE RECTANGULAR SHAPE: Brand: KENDALL

## (undated) DEVICE — SOLIDIFIER MEDC 1200ML -- CONVERT TO 356117

## (undated) DEVICE — INFECTION CONTROL KIT SYS

## (undated) DEVICE — HANDLE LT SNAP ON ULT DURABLE LENS FOR TRUMPF ALC DISPOSABLE

## (undated) DEVICE — (D)PREP SKN CHLRAPRP APPL 26ML -- CONVERT TO ITEM 371833